# Patient Record
Sex: FEMALE | Race: WHITE | Employment: FULL TIME | URBAN - METROPOLITAN AREA
[De-identification: names, ages, dates, MRNs, and addresses within clinical notes are randomized per-mention and may not be internally consistent; named-entity substitution may affect disease eponyms.]

---

## 2017-07-24 ENCOUNTER — TELEPHONE (OUTPATIENT)
Dept: FAMILY MEDICINE | Facility: CLINIC | Age: 52
End: 2017-07-24

## 2017-07-24 DIAGNOSIS — Z12.31 ENCOUNTER FOR SCREENING MAMMOGRAM FOR BREAST CANCER: Primary | ICD-10-CM

## 2017-07-24 NOTE — TELEPHONE ENCOUNTER
Reason for Call: Request for an order or referral:    Order or referral being requested: mammogram     Date needed: as soon as possible    Has the patient been seen by the PCP for this problem? Not Applicable    Additional comments: Patient needs an order to schedule her routine mammogram.   She would like someone to call her so she can schedule once the order is in.     Phone number Patient can be reached at:  Home number on file [phone] (home)    Best Time:  anytime    Can we leave a detailed message on this number?  YES    Call taken on 7/24/2017 at 1:47 PM by Mya Dougherty

## 2017-07-24 NOTE — TELEPHONE ENCOUNTER
Last mammo was 9/2/16, no new orders in.    Routed to PCP.    Spring Blanc RN  [facility]

## 2017-08-10 ENCOUNTER — TELEPHONE (OUTPATIENT)
Dept: FAMILY MEDICINE | Facility: CLINIC | Age: 52
End: 2017-08-10
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
## 2017-08-10 RX ORDER — ACETAZOLAMIDE 250 MG/1
250 TABLET ORAL 2 TIMES DAILY
Qty: 12 TABLET | Refills: 0 | Status: SHIPPED | OUTPATIENT
Start: 2017-08-10 | End: 2017-09-07

## 2017-08-10 NOTE — TELEPHONE ENCOUNTER
Reason for Call:  Other     Detailed comments: Would like to discuss a medication for high altitude for [place]      Phone Number Patient can be reached at: Cell number on file:    Telephone Information:   Mobile [phone]       Best Time:     Can we leave a detailed message on this number? Not Applicable    Call taken on 8/10/2017 at 11:38 AM by Isamar Dumont

## 2017-08-10 NOTE — TELEPHONE ENCOUNTER
Patient last had routine visit with PCP 9/2/16, was advised annual visit.    I see acetazolamide often used for travel to altitude:  Acetazolamide is used to prevent and reduce the symptoms of altitude sickness. This medication can decrease headache, tiredness, nausea, dizziness, and shortness of breath that can occur when you climb quickly to high altitudes (generally above 10,000 feet/3,048 meters).  Pharmacy selected.    Routed to [facility] to advise on request for travel Rx.    Becky Trejo RN  [facility]

## 2017-08-20 DIAGNOSIS — E03.4 HYPOTHYROIDISM DUE TO ACQUIRED ATROPHY OF THYROID: ICD-10-CM

## 2017-08-21 RX ORDER — LEVOTHYROXINE SODIUM 75 UG/1
TABLET ORAL
Qty: 30 TABLET | Refills: 0 | Status: SHIPPED | OUTPATIENT
Start: 2017-08-21 | End: 2017-10-08

## 2017-08-21 NOTE — TELEPHONE ENCOUNTER
Levothyroxine 75 mcg     Last Written Prescription Date: 5/3/16  Last Quantity: 90, # refills: 3  Last Office Visit with G, P or [facility] prescribing provider: 12/16/16   Next 5 appointments (look out 90 days)     Sep 07, 2017  3:20 PM CDT   PHYSICAL with Tayla Espinal MD   [facility]    [address] [place] [id number]   [phone]                   TSH   Date Value Ref Range Status   09/02/2016 3.08 0.40 - 4.00 mU/L Final     Medication is being filled for 1 time refill only due to:  Patient needs labs TSH.   Is scheduled for physical  Becky Trejo RN  [facility]

## 2017-09-07 ENCOUNTER — OFFICE VISIT (OUTPATIENT)
Dept: FAMILY MEDICINE | Facility: CLINIC | Age: 52
End: 2017-09-07
Payer: COMMERCIAL

## 2017-09-07 VITALS
TEMPERATURE: 98.3 F | SYSTOLIC BLOOD PRESSURE: 128 MMHG | WEIGHT: 198 LBS | HEART RATE: 63 BPM | HEIGHT: 63 IN | OXYGEN SATURATION: 100 % | DIASTOLIC BLOOD PRESSURE: 75 MMHG | BODY MASS INDEX: 35.08 KG/M2

## 2017-09-07 DIAGNOSIS — Z79.1 NSAID LONG-TERM USE: ICD-10-CM

## 2017-09-07 DIAGNOSIS — Z00.00 ROUTINE GENERAL MEDICAL EXAMINATION AT A HEALTH CARE FACILITY: Primary | ICD-10-CM

## 2017-09-07 DIAGNOSIS — Z86.2 HISTORY OF ANEMIA: ICD-10-CM

## 2017-09-07 DIAGNOSIS — E03.4 HYPOTHYROIDISM DUE TO ACQUIRED ATROPHY OF THYROID: ICD-10-CM

## 2017-09-07 DIAGNOSIS — Z12.31 ENCOUNTER FOR SCREENING MAMMOGRAM FOR BREAST CANCER: ICD-10-CM

## 2017-09-07 LAB
ANION GAP SERPL CALCULATED.3IONS-SCNC: 11 MMOL/L (ref 3–14)
BUN SERPL-MCNC: 10 MG/DL (ref 7–30)
CALCIUM SERPL-MCNC: 9.2 MG/DL (ref 8.5–10.1)
CHLORIDE SERPL-SCNC: 105 MMOL/L (ref 94–109)
CO2 SERPL-SCNC: 23 MMOL/L (ref 20–32)
CREAT SERPL-MCNC: 0.85 MG/DL (ref 0.52–1.04)
ERYTHROCYTE [DISTWIDTH] IN BLOOD BY AUTOMATED COUNT: 16 % (ref 10–15)
GFR SERPL CREATININE-BSD FRML MDRD: 70 ML/MIN/1.7M2
GLUCOSE SERPL-MCNC: 93 MG/DL (ref 70–99)
HCT VFR BLD AUTO: 37.7 % (ref 35–47)
HGB BLD-MCNC: 12 G/DL (ref 11.7–15.7)
MCH RBC QN AUTO: 24.9 PG (ref 26.5–33)
MCHC RBC AUTO-ENTMCNC: 31.8 G/DL (ref 31.5–36.5)
MCV RBC AUTO: 78 FL (ref 78–100)
PLATELET # BLD AUTO: 423 10E9/L (ref 150–450)
POTASSIUM SERPL-SCNC: 4.3 MMOL/L (ref 3.4–5.3)
RBC # BLD AUTO: 4.81 10E12/L (ref 3.8–5.2)
SODIUM SERPL-SCNC: 139 MMOL/L (ref 133–144)
TSH SERPL DL<=0.005 MIU/L-ACNC: 3.42 MU/L (ref 0.4–4)
WBC # BLD AUTO: 7.2 10E9/L (ref 4–11)

## 2017-09-07 PROCEDURE — 99396 PREV VISIT EST AGE 40-64: CPT | Performed by: INTERNAL MEDICINE

## 2017-09-07 PROCEDURE — 80048 BASIC METABOLIC PNL TOTAL CA: CPT | Performed by: INTERNAL MEDICINE

## 2017-09-07 PROCEDURE — 85027 COMPLETE CBC AUTOMATED: CPT | Performed by: INTERNAL MEDICINE

## 2017-09-07 PROCEDURE — 84443 ASSAY THYROID STIM HORMONE: CPT | Performed by: INTERNAL MEDICINE

## 2017-09-07 PROCEDURE — 36415 COLL VENOUS BLD VENIPUNCTURE: CPT | Performed by: INTERNAL MEDICINE

## 2017-09-07 NOTE — NURSING NOTE
"Chief Complaint   Patient presents with     Physical     Health Maintenance     tsh       Initial /75 (BP Location: Right arm, Patient Position: Chair, Cuff Size: Adult Regular)  Pulse 63  Temp 98.3  F (36.8  C) (Oral)  Ht 5' 2.5\" (1.588 m)  Wt 198 lb (89.8 kg)  SpO2 100%  BMI 35.64 kg/m2 Estimated body mass index is 35.64 kg/(m^2) as calculated from the following:    Height as of this encounter: 5' 2.5\" (1.588 m).    Weight as of this encounter: 198 lb (89.8 kg).  Medication Reconciliation: complete   Geena Lyons ma      "

## 2017-09-07 NOTE — PROGRESS NOTES
SUBJECTIVE:   CC: Ave Willoughby is an 52 year old woman who presents for preventive health visit.     Physical   Annual:     Getting at least 3 servings of Calcium per day::  Yes    Bi-annual eye exam::  Yes    Dental care twice a year::  Yes    Sleep apnea or symptoms of sleep apnea::  None    Diet::  Regular (no restrictions)    Frequency of exercise::  4-5 days/week    Duration of exercise::  30-45 minutes    Taking medications regularly::  Yes    Medication side effects::  None    Additional concerns today::  No      53 y/o F with hypothyroid, lumbar spinal stenosis, family hx colon cancer and family history for breast cancer here for AFE...     Via WW, she has lost 20# and maintained it so far.     No concerns    Today's PHQ-2 Score: PHQ-2 ( 1999 [employer]) 9/7/2017   Q1: Little interest or pleasure in doing things 0   Q2: Feeling down, depressed or hopeless 0   PHQ-2 Score 0   Q1: Little interest or pleasure in doing things Not at all   Q2: Feeling down, depressed or hopeless Not at all   PHQ-2 Score 0       Abuse: Current or Past(Physical, Sexual or Emotional)- No  Do you feel safe in your environment - Yes    Social History   Substance Use Topics     Smoking status: Never Smoker     Smokeless tobacco: Never Used     Alcohol use Yes      Comment: occasional     The patient does not drink >3 drinks per day nor >7 drinks per week.    Reviewed orders with patient.  Reviewed health maintenance and updated orders accordingly - Yes  Labs reviewed in EPIC  BP Readings from Last 3 Encounters:   09/07/17 128/75   12/16/16 128/81   11/12/16 148/80    Wt Readings from Last 3 Encounters:   09/07/17 198 lb (89.8 kg)   12/16/16 194 lb (88 kg)   11/12/16 194 lb (88 kg)                  Patient Active Problem List   Diagnosis     CARDIOVASCULAR SCREENING; LDL GOAL LESS THAN 130     Lumbar pain     Spondylolisthesis of lumbar region     Lumbar foraminal stenosis     Sacroiliac pain     Family history of colon cancer      Iron deficiency anemia due to chronic blood loss     Hypothyroidism due to acquired atrophy of thyroid     Past Surgical History:   Procedure Laterality Date     APPENDECTOMY  4/11/2011    Dr Lona Duncan     COLONOSCOPY  9-29-08     ENDOSCOPY UPPER, COLONOSCOPY, COMBINED  7/1/2013    Procedure: COMBINED ENDOSCOPY UPPER, COLONOSCOPY;  COLONOSCOPY SCREEN-FAMILY HX OF COLON CANCER/ EGD-UPPER ENDOSCOPY-UPPER GI SYMPTOMS/ PATTIE;  Surgeon: Lc Cervantes MD;  Location: [facility]     ESOPHAGOSCOPY, GASTROSCOPY, DUODENOSCOPY (EGD), COMBINED  7/1/2013    Procedure: COMBINED ESOPHAGOSCOPY, GASTROSCOPY, DUODENOSCOPY (EGD), BIOPSY SINGLE OR MULTIPLE;;  Surgeon: Lc Cervantes MD;  Location: [facility] OR       Social History   Substance Use Topics     Smoking status: Never Smoker     Smokeless tobacco: Never Used     Alcohol use Yes      Comment: occasional     Family History   Problem Relation Age of Onset     Breast Cancer Mother      Thyroid Disease Mother      Cancer - colorectal Father      CANCER Father      brain     DIABETES Maternal Grandmother      Cardiovascular Paternal Grandmother      Thyroid Disease Sister          Current Outpatient Prescriptions   Medication Sig Dispense Refill     levothyroxine (SYNTHROID/LEVOTHROID) 75 MCG tablet TAKE 1 TABLET (75 MCG) BY MOUTH DAILY 30 tablet 0     ibuprofen (ADVIL) 200 MG capsule Take 200 mg by mouth every 4 hours as needed. For menstral cramps       Allergies   Allergen Reactions     Penicillins      Recent Labs   Lab Test  09/02/16   1006  10/15/15   0808   10/09/13   0712  06/06/13   1821   12/01/11   1846 04/10/11   LDL   --   73   --   83   --    --    --    --    HDL   --   46*   --   41*   --    --    --    --    TRIG   --   79   --   92   --    --    --    --    ALT   --    --    --    --   24   --    --   11   CR   --   0.83   --    --    --    --   0.90  0.80   GFRESTIMATED   --   72   --    --    --    --   67   --    GFRESTBLACK   --   88   --    --     --    --   81   --    POTASSIUM   --   4.1   --    --    --    --   4.0  3.5   TSH  3.08  2.69   < >  3.63   --    < >  3.10   --     < > = values in this interval not displayed.              Patient over age 50, mutual decision to screen reflected in health maintenance.      Pertinent mammograms are reviewed under the imaging tab.  History of abnormal Pap smear: NO - age 30-65 PAP every 5 years with negative HPV co-testing recommended    Reviewed and updated as needed this visit by clinical staffTobacco  Allergies  Med Hx  Surg Hx  Fam Hx  Soc Hx        Reviewed and updated as needed this visit by Provider        Past Medical History:   Diagnosis Date     Family history of colon cancer     due 2013 for      Hypothyroid      Sciatic nerve disease       Past Surgical History:   Procedure Laterality Date     APPENDECTOMY  4/11/2011    Dr Lona Duncan     COLONOSCOPY  9-29-08     ENDOSCOPY UPPER, COLONOSCOPY, COMBINED  7/1/2013    Procedure: COMBINED ENDOSCOPY UPPER, COLONOSCOPY;  COLONOSCOPY SCREEN-FAMILY HX OF COLON CANCER/ EGD-UPPER ENDOSCOPY-UPPER GI SYMPTOMS/ PATTIE;  Surgeon: Lc Cervantes MD;  Location: [facility]     ESOPHAGOSCOPY, GASTROSCOPY, DUODENOSCOPY (EGD), COMBINED  7/1/2013    Procedure: COMBINED ESOPHAGOSCOPY, GASTROSCOPY, DUODENOSCOPY (EGD), BIOPSY SINGLE OR MULTIPLE;;  Surgeon: Lc Cervantes MD;  Location: [facility]       ROS:  C: NEGATIVE for fever, chills, change in weight  I: NEGATIVE for worrisome rashes, moles or lesions  E: NEGATIVE for vision changes or irritation  ENT: NEGATIVE for ear, mouth and throat problems  R: NEGATIVE for significant cough or SOB  B: NEGATIVE for masses, tenderness or discharge  CV: NEGATIVE for chest pain, palpitations or peripheral edema  GI: NEGATIVE for nausea, abdominal pain, heartburn, or change in bowel habits  : NEGATIVE for unusual urinary or vaginal symptoms. No vaginal bleeding.    Still has regular periods. Having some dryness.  "  M: NEGATIVE for significant arthralgias or myalgia  N: NEGATIVE for weakness, dizziness or paresthesias  E: NEGATIVE for temperature intolerance, skin/hair changes  P: NEGATIVE for changes in mood or affect      OBJECTIVE:   /75 (BP Location: Right arm, Patient Position: Chair, Cuff Size: Adult Regular)  Pulse 63  Temp 98.3  F (36.8  C) (Oral)  Ht 5' 2.5\" (1.588 m)  Wt 198 lb (89.8 kg)  SpO2 100%  BMI 35.64 kg/m2  EXAM:  GENERAL APPEARANCE: healthy, alert and no distress  EYES: Eyes grossly normal to inspection, PERRL and conjunctivae and sclerae normal  HENT: ear canals and TM's normal, nose and mouth without ulcers or lesions, oropharynx clear and oral mucous membranes moist  NECK: no adenopathy, no asymmetry, masses, or scars and thyroid normal to palpation  RESP: lungs clear to auscultation - no rales, rhonchi or wheezes  BREAST: normal without masses, tenderness or nipple discharge and no palpable axillary masses or adenopathy  CV: regular rate and rhythm, normal S1 S2, no S3 or S4, no murmur, click or rub, no peripheral edema and peripheral pulses strong  ABDOMEN: soft, nontender, no hepatosplenomegaly, no masses and bowel sounds normal  ABDOMEN:  appy scar.   MS: no musculoskeletal defects are noted and gait is age appropriate without ataxia  SKIN: no suspicious lesions or rashes  NEURO: Normal strength and tone, sensory exam grossly normal, mentation intact and speech normal  PSYCH: mentation appears normal and affect normal/bright    ASSESSMENT/PLAN:       ICD-10-CM    1. Routine general medical examination at a health care facility Z00.00    2. Hypothyroidism due to acquired atrophy of thyroid E03.4 TSH WITH FREE T4 REFLEX   3. Encounter for screening mammogram for breast cancer Z12.31 *MA Screening Digital Bilateral   4. NSAID long-term use Z79.1 Basic metabolic panel   5. History of anemia Z86.2 CBC with platelets       Late menopause. Still having regular periods: No spotting no " "menorrhagia.  Very cyclical.  Watchful waiting for now.   Patient will get her routine mammogram soon.   COUNSELING:  Reviewed preventive health counseling, as reflected in patient instructions       Healthy diet/nutrition         reports that she has never smoked. She has never used smokeless tobacco.    Estimated body mass index is 35.64 kg/(m^2) as calculated from the following:    Height as of this encounter: 5' 2.5\" (1.588 m).    Weight as of this encounter: 198 lb (89.8 kg).   Weight management plan: has lost weight via WW. exercising    Counseling Resources:  ATP IV Guidelines  Pooled Cohorts Equation Calculator  Breast Cancer Risk Calculator  FRAX Risk Assessment  ICSI Preventive Guidelines  Dietary Guidelines for Americans, 2010  USDA's MyPlate  ASA Prophylaxis  Lung CA Screening    Tayla Espinal MD  [facility]  Answers for HPI/ROS submitted by the patient on 9/7/2017   PHQ-2 Score: 0    "

## 2017-09-07 NOTE — MR AVS SNAPSHOT
After Visit Summary   9/7/2017    Ave Willoughby    MRN: [id number]           Patient Information     Date Of Birth          1965        Visit Information        Provider Department      9/7/2017 3:20 PM Tayla Espinal MD [facility]        Today's Diagnoses     Routine general medical examination at a health care facility    -  1    Hypothyroidism due to acquired atrophy of thyroid        Encounter for screening mammogram for breast cancer        NSAID long-term use        History of anemia           Follow-ups after your visit        Follow-up notes from your care team     Return in about 1 year (around 9/7/2018), or if symptoms worsen or fail to improve, for Physical Exam.      Future tests that were ordered for you today     Open Future Orders        Priority Expected Expires Ordered    *MA Screening Digital Bilateral Routine  9/7/2018 9/7/2017            Who to contact     If you have questions or need follow up information about today's clinic visit or your schedule please contact [facility] directly at [phone].  Normal or non-critical lab and imaging results will be communicated to you by Zeviahart, letter or phone within 4 business days after the clinic has received the results. If you do not hear from us within 7 days, please contact the clinic through Zeviahart or phone. If you have a critical or abnormal lab result, we will notify you by phone as soon as possible.  Submit refill requests through Behalf or call your pharmacy and they will forward the refill request to us. Please allow 3 business days for your refill to be completed.          Additional Information About Your Visit        MyChart Information     Behalf gives you secure access to your electronic health record. If you see a primary care provider, you can also send messages to your care team and make appointments. If you have questions, please call your primary care  "clinic.  If you do not have a primary care provider, please call [phone] and they will assist you.        Care EveryWhere ID     This is your Care EveryWhere ID. This could be used by other organizations to access your [place] medical records  [id number]        Your Vitals Were     Pulse Temperature Height Pulse Oximetry BMI (Body Mass Index)       63 98.3  F (36.8  C) (Oral) 5' 2.5\" (1.588 m) 100% 35.64 kg/m2        Blood Pressure from Last 3 Encounters:   09/07/17 128/75   12/16/16 128/81   11/12/16 148/80    Weight from Last 3 Encounters:   09/07/17 198 lb (89.8 kg)   12/16/16 194 lb (88 kg)   11/12/16 194 lb (88 kg)              We Performed the Following     Basic metabolic panel     CBC with platelets     TSH WITH FREE T4 REFLEX        Primary Care Provider Office Phone # Fax #    Tayla Espinal [phone]       [address] [facility] [address]        Equal Access to Services     [facility]: Hadii aad ku hadasho Soomaali, waaxda luqadaha, qaybta kaalmada adeegyayessi, anai cutler . So [facility] [phone].    ATENCIÓN: Si habla español, tiene a carrillo disposición servicios gratuitos de asistencia lingüística. Llame al [phone].    We comply with applicable federal civil rights laws and [place] laws. We do not discriminate on the basis of race, color, national origin, age, disability sex, sexual orientation or gender identity.            Thank you!     Thank you for choosing [facility]  for your care. Our goal is always to provide you with excellent care. Hearing back from our patients is one way we can continue to improve our services. Please take a few minutes to complete the written survey that you may receive in the mail after your visit with us. Thank you!             Your Updated Medication List - Protect others around you: Learn how to safely use, store and throw away your medicines at www.disposemymeds.org. "          This list is accurate as of: 9/7/17  4:10 PM.  Always use your most recent med list.                   Brand Name Dispense Instructions for use Diagnosis    ADVIL 200 MG capsule   Generic drug:  ibuprofen      Take 200 mg by mouth every 4 hours as needed. For menstral cramps        levothyroxine 75 MCG tablet    SYNTHROID/LEVOTHROID    30 tablet    TAKE 1 TABLET (75 MCG) BY MOUTH DAILY    Hypothyroidism due to acquired atrophy of thyroid

## 2017-10-08 DIAGNOSIS — E03.4 HYPOTHYROIDISM DUE TO ACQUIRED ATROPHY OF THYROID: ICD-10-CM

## 2017-10-09 RX ORDER — LEVOTHYROXINE SODIUM 75 UG/1
TABLET ORAL
Qty: 90 TABLET | Refills: 3 | Status: SHIPPED | OUTPATIENT
Start: 2017-10-09 | End: 2018-11-08

## 2017-10-09 NOTE — TELEPHONE ENCOUNTER
levothyroxine (SYNTHROID/LEVOTHROID) 75 MCG tablet     Last Written Prescription Date: 8/21/17  Last Quantity: 30, # refills: 0  Last Office Visit with [facility], [facility] or [facility] prescribing provider: 9/7/17        TSH   Date Value Ref Range Status   09/07/2017 3.42 0.40 - 4.00 mU/L Final

## 2017-10-09 NOTE — TELEPHONE ENCOUNTER
Prescription approved per [facility] Refill Protocol.    Spring Blanc RN  [facility]

## 2017-10-20 ENCOUNTER — RADIANT APPOINTMENT (OUTPATIENT)
Dept: MAMMOGRAPHY | Facility: CLINIC | Age: 52
End: 2017-10-20
Payer: COMMERCIAL

## 2017-10-20 DIAGNOSIS — Z12.31 VISIT FOR SCREENING MAMMOGRAM: ICD-10-CM

## 2017-10-20 PROCEDURE — G0202 SCR MAMMO BI INCL CAD: HCPCS | Mod: TC

## 2018-04-10 ENCOUNTER — TRANSFERRED RECORDS (OUTPATIENT)
Dept: HEALTH INFORMATION MANAGEMENT | Facility: CLINIC | Age: 53
End: 2018-04-10

## 2018-06-12 ENCOUNTER — OFFICE VISIT (OUTPATIENT)
Dept: FAMILY MEDICINE | Facility: CLINIC | Age: 53
End: 2018-06-12
Payer: COMMERCIAL

## 2018-06-12 VITALS
WEIGHT: 199 LBS | DIASTOLIC BLOOD PRESSURE: 74 MMHG | BODY MASS INDEX: 35.82 KG/M2 | OXYGEN SATURATION: 100 % | HEART RATE: 65 BPM | TEMPERATURE: 98.5 F | SYSTOLIC BLOOD PRESSURE: 132 MMHG

## 2018-06-12 DIAGNOSIS — Z12.11 SPECIAL SCREENING FOR MALIGNANT NEOPLASMS, COLON: ICD-10-CM

## 2018-06-12 DIAGNOSIS — Z11.4 SCREENING FOR HIV (HUMAN IMMUNODEFICIENCY VIRUS): ICD-10-CM

## 2018-06-12 DIAGNOSIS — E03.4 HYPOTHYROIDISM DUE TO ACQUIRED ATROPHY OF THYROID: Primary | ICD-10-CM

## 2018-06-12 DIAGNOSIS — R52 BODY ACHES: ICD-10-CM

## 2018-06-12 DIAGNOSIS — Z86.39 HISTORY OF IRON DEFICIENCY: ICD-10-CM

## 2018-06-12 LAB
BASOPHILS # BLD AUTO: 0.1 10E9/L (ref 0–0.2)
BASOPHILS NFR BLD AUTO: 1.2 %
CRP SERPL-MCNC: <2.9 MG/L (ref 0–8)
DIFFERENTIAL METHOD BLD: ABNORMAL
EOSINOPHIL # BLD AUTO: 0.3 10E9/L (ref 0–0.7)
EOSINOPHIL NFR BLD AUTO: 5.4 %
ERYTHROCYTE [DISTWIDTH] IN BLOOD BY AUTOMATED COUNT: 19.1 % (ref 10–15)
ERYTHROCYTE [SEDIMENTATION RATE] IN BLOOD BY WESTERGREN METHOD: 10 MM/H (ref 0–30)
FERRITIN SERPL-MCNC: 8 NG/ML (ref 8–252)
HCT VFR BLD AUTO: 42.7 % (ref 35–47)
HGB BLD-MCNC: 14 G/DL (ref 11.7–15.7)
LYMPHOCYTES # BLD AUTO: 1.9 10E9/L (ref 0.8–5.3)
LYMPHOCYTES NFR BLD AUTO: 32.4 %
MCH RBC QN AUTO: 26.5 PG (ref 26.5–33)
MCHC RBC AUTO-ENTMCNC: 32.8 G/DL (ref 31.5–36.5)
MCV RBC AUTO: 81 FL (ref 78–100)
MONOCYTES # BLD AUTO: 0.5 10E9/L (ref 0–1.3)
MONOCYTES NFR BLD AUTO: 7.9 %
NEUTROPHILS # BLD AUTO: 3.2 10E9/L (ref 1.6–8.3)
NEUTROPHILS NFR BLD AUTO: 53.1 %
PLATELET # BLD AUTO: 348 10E9/L (ref 150–450)
RBC # BLD AUTO: 5.29 10E12/L (ref 3.8–5.2)
TSH SERPL DL<=0.005 MIU/L-ACNC: 2.88 MU/L (ref 0.4–4)
WBC # BLD AUTO: 5.9 10E9/L (ref 4–11)

## 2018-06-12 PROCEDURE — 87389 HIV-1 AG W/HIV-1&-2 AB AG IA: CPT | Performed by: INTERNAL MEDICINE

## 2018-06-12 PROCEDURE — 86618 LYME DISEASE ANTIBODY: CPT | Performed by: INTERNAL MEDICINE

## 2018-06-12 PROCEDURE — 86140 C-REACTIVE PROTEIN: CPT | Performed by: INTERNAL MEDICINE

## 2018-06-12 PROCEDURE — 99214 OFFICE O/P EST MOD 30 MIN: CPT | Performed by: INTERNAL MEDICINE

## 2018-06-12 PROCEDURE — 85025 COMPLETE CBC W/AUTO DIFF WBC: CPT | Performed by: INTERNAL MEDICINE

## 2018-06-12 PROCEDURE — 84443 ASSAY THYROID STIM HORMONE: CPT | Performed by: INTERNAL MEDICINE

## 2018-06-12 PROCEDURE — 36415 COLL VENOUS BLD VENIPUNCTURE: CPT | Performed by: INTERNAL MEDICINE

## 2018-06-12 PROCEDURE — 82728 ASSAY OF FERRITIN: CPT | Performed by: INTERNAL MEDICINE

## 2018-06-12 PROCEDURE — 85652 RBC SED RATE AUTOMATED: CPT | Performed by: INTERNAL MEDICINE

## 2018-06-12 ASSESSMENT — PAIN SCALES - GENERAL: PAINLEVEL: SEVERE PAIN (6)

## 2018-06-12 NOTE — PROGRESS NOTES
"  SUBJECTIVE:   Ave Willoughby is a 52 year old female who presents to clinic today for the following health issues:    51 y/o F with h/o Lumbar SS, hypothyroid and h/o TAMIKA here due to \"not feeling well\"     .   Fatigue      Duration: a week and a half    Description (location/character/radiation): Feeling little to no energy, headache, stiff neck    Intensity:  severe    Accompanying signs and symptoms: None    History (similar episodes/previous evaluation): None    Precipitating or alleviating factors: Trying to rest and drink more water    Therapies tried and outcome: Advil helps for a few hours but symptoms do come back     Stated that a friend had mono and her symptoms were similar.    Michelle Bee MA    Still getting monthly periods (skipped a month or two this year.)  No fever no cough.  Claims compliance with meds.    Wants to sleep a lot more, achey a lot of the time (elbows and knees are achey).  Might be improved:  Did mow lawn.      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 Patient lives in [place] and has not recently travelled.   No camping.          Problem list and histories reviewed & adjusted, as indicated.  Additional history: as documented    Patient Active Problem List   Diagnosis     CARDIOVASCULAR SCREENING; LDL GOAL LESS THAN 130     Lumbar pain     Spondylolisthesis of lumbar region     Lumbar foraminal stenosis     Sacroiliac pain     Family history of colon cancer     Iron deficiency anemia due to chronic blood loss     Hypothyroidism due to acquired atrophy of thyroid     Past Surgical History:   Procedure Laterality Date     APPENDECTOMY  4/11/2011    Dr Lona Duncan     COLONOSCOPY  9-29-08     ENDOSCOPY UPPER, COLONOSCOPY, COMBINED  7/1/2013    Procedure: COMBINED ENDOSCOPY UPPER, COLONOSCOPY;  COLONOSCOPY SCREEN-FAMILY HX OF COLON CANCER/ EGD-UPPER " ENDOSCOPY-UPPER GI SYMPTOMS/ PATTIE;  Surgeon: Lc Cervantes MD;  Location: [facility]     ESOPHAGOSCOPY, GASTROSCOPY, DUODENOSCOPY (EGD), COMBINED  7/1/2013    Procedure: COMBINED ESOPHAGOSCOPY, GASTROSCOPY, DUODENOSCOPY (EGD), BIOPSY SINGLE OR MULTIPLE;;  Surgeon: Lc Cervantes MD;  Location: [facility] OR       Social History   Substance Use Topics     Smoking status: Never Smoker     Smokeless tobacco: Never Used     Alcohol use Yes      Comment: occasional     Family History   Problem Relation Age of Onset     Breast Cancer Mother      Thyroid Disease Mother      Cancer - colorectal Father      CANCER Father      brain     DIABETES Maternal Grandmother      Cardiovascular Paternal Grandmother      Thyroid Disease Sister          Current Outpatient Prescriptions   Medication Sig Dispense Refill     ibuprofen (ADVIL) 200 MG capsule Take 200 mg by mouth every 4 hours as needed. For menstral cramps       levothyroxine (SYNTHROID/LEVOTHROID) 75 MCG tablet TAKE 1 TABLET BY MOUTH DAILY 90 tablet 3     Allergies   Allergen Reactions     Penicillins      BP Readings from Last 3 Encounters:   06/12/18 132/74   09/07/17 128/75   12/16/16 128/81    Wt Readings from Last 3 Encounters:   06/12/18 199 lb (90.3 kg)   09/07/17 198 lb (89.8 kg)   12/16/16 194 lb (88 kg)                    Reviewed and updated as needed this visit by clinical staff       Reviewed and updated as needed this visit by Provider         ROS:  Constitutional, HEENT, cardiovascular, pulmonary, gi and gu systems are negative, except as otherwise noted.    OBJECTIVE:     /74 (BP Location: Right arm, Patient Position: Chair, Cuff Size: Adult Large)  Pulse 65  Temp 98.5  F (36.9  C) (Oral)  Wt 199 lb (90.3 kg)  LMP 06/24/2012  SpO2 100%  BMI 35.82 kg/m2  Body mass index is 35.82 kg/(m^2).  GENERAL: healthy, alert and no distress  EYES: Eyes grossly normal to inspection, PERRL and conjunctivae and sclerae normal  HENT: ear canals and  TM's normal, nose and mouth without ulcers or lesions  NECK: no adenopathy, no asymmetry, masses, or scars and thyroid normal to palpation  RESP: lungs clear to auscultation - no rales, rhonchi or wheezes  CV: regular rate and rhythm, normal S1 S2, no S3 or S4, no murmur, click or rub, no peripheral edema and peripheral pulses strong  ABDOMEN: soft, nontender, no hepatosplenomegaly, no masses and bowel sounds normal  MS: no gross musculoskeletal defects noted, no edema  SKIN: no suspicious lesions or rashes  PSYCH: mentation appears normal, affect normal/bright  LYMPH: no cervical, supraclavicular, axillary, or inguinal adenopathy    Diagnostic Test Results:  See labs orders.     ASSESSMENT/PLAN:          ICD-10-CM    1. Hypothyroidism due to acquired atrophy of thyroid E03.4 TSH with free T4 reflex   2. History of iron deficiency Z86.39 Ferritin   3. Body aches R52 Lyme Disease Anaya with reflex to WB Serum     ESR: Erythrocyte sedimentation rate     CRP, inflammation     CBC with platelets differential   4. Special screening for malignant neoplasms, colon Z12.11 GASTROENTEROLOGY ADULT REF PROCEDURE ONLY   5. Screening for HIV (human immunodeficiency virus) Z11.4 HIV Antigen Antibody Combo        likely viral syndrome, expect improvement, call if no improvement.     Colonoscopy due, patient would like order entered.     Tayla Espinal MD  [facility]

## 2018-06-12 NOTE — MR AVS SNAPSHOT
After Visit Summary   6/12/2018    Ave Willoughby    MRN: [id number]           Patient Information     Date Of Birth          1965        Visit Information        Provider Department      6/12/2018 2:00 PM Tayla Espinal MD [facility]        Today's Diagnoses     Hypothyroidism due to acquired atrophy of thyroid    -  1    History of iron deficiency        Body aches        Special screening for malignant neoplasms, colon        Screening for HIV (human immunodeficiency virus)           Follow-ups after your visit        Additional Services     GASTROENTEROLOGY ADULT REF PROCEDURE ONLY       Last Lab Result: Creatinine (mg/dL)       Date                     Value                 09/07/2017               0.85             ----------  Body mass index is 35.82 kg/(m^2).     Needed:  No  Language:  English    Patient will be contacted to schedule procedure.     Please be aware that coverage of these services is subject to the terms and limitations of your health insurance plan.  Call member services at your health plan with any benefit or coverage questions.  Any procedures must be performed at a [facility] facility OR coordinated by your clinic's referral office.    Please bring the following with you to your appointment:    (1) Any X-Rays, CTs or MRIs which have been performed.  Contact the facility where they were done to arrange for  prior to your scheduled appointment.    (2) List of current medications   (3) This referral request   (4) Any documents/labs given to you for this referral                  Who to contact     If you have questions or need follow up information about today's clinic visit or your schedule please contact [facility] directly at [phone].  Normal or non-critical lab and imaging results will be communicated to you by MyChart, letter or phone within 4 business days after the clinic has received the  results. If you do not hear from us within 7 days, please contact the clinic through [employer] or phone. If you have a critical or abnormal lab result, we will notify you by phone as soon as possible.  Submit refill requests through [employer] or call your pharmacy and they will forward the refill request to us. Please allow 3 business days for your refill to be completed.          Additional Information About Your Visit        [employer] Information     [employer] gives you secure access to your electronic health record. If you see a primary care provider, you can also send messages to your care team and make appointments. If you have questions, please call your primary care clinic.  If you do not have a primary care provider, please call [phone] and they will assist you.        Care EveryWhere ID     This is your Care EveryWhere ID. This could be used by other organizations to access your [facility] medical records  [id number]        Your Vitals Were     Pulse Temperature Last Period Pulse Oximetry BMI (Body Mass Index)       65 98.5  F (36.9  C) (Oral) 06/24/2012 100% 35.82 kg/m2        Blood Pressure from Last 3 Encounters:   06/12/18 132/74   09/07/17 128/75   12/16/16 128/81    Weight from Last 3 Encounters:   06/12/18 199 lb (90.3 kg)   09/07/17 198 lb (89.8 kg)   12/16/16 194 lb (88 kg)              We Performed the Following     CBC with platelets differential     CRP, inflammation     ESR: Erythrocyte sedimentation rate     Ferritin     GASTROENTEROLOGY ADULT REF PROCEDURE ONLY     HIV Antigen Antibody Combo     Lyme Disease Anaya with reflex to WB Serum     TSH with free T4 reflex        Primary Care Provider Office Phone # Fax #    Tayla Espinal [phone]       [address] [facility] [address]        Equal Access to Services     [place] : Ellen Gaspar, romy holguin, anai camarena. So [facility]  [phone].    ATENCIÓN: Si sara garcia, tiene a carrillo disposición servicios gratuitos de asistencia lingüística. Edwina little [phone].    We comply with applicable federal civil rights laws and [place] laws. We do not discriminate on the basis of race, color, national origin, age, disability, sex, sexual orientation, or gender identity.            Thank you!     Thank you for choosing [facility]  for your care. Our goal is always to provide you with excellent care. Hearing back from our patients is one way we can continue to improve our services. Please take a few minutes to complete the written survey that you may receive in the mail after your visit with us. Thank you!             Your Updated Medication List - Protect others around you: Learn how to safely use, store and throw away your medicines at www.disposemymeds.org.          This list is accurate as of 6/12/18  2:32 PM.  Always use your most recent med list.                   Brand Name Dispense Instructions for use Diagnosis    ADVIL 200 MG capsule   Generic drug:  ibuprofen      Take 200 mg by mouth every 4 hours as needed. For menstral cramps        levothyroxine 75 MCG tablet    SYNTHROID/LEVOTHROID    90 tablet    TAKE 1 TABLET BY MOUTH DAILY    Hypothyroidism due to acquired atrophy of thyroid

## 2018-06-13 LAB
B BURGDOR IGG+IGM SER QL: 0.14 (ref 0–0.89)
HIV 1+2 AB+HIV1 P24 AG SERPL QL IA: NONREACTIVE

## 2018-06-13 NOTE — PROGRESS NOTES
Yunier White    Inflammatory labs and Lyme titer all normal.   If your symptoms don't improve by next week, let me know    Sincerely,     MALKA BLANKENSHIP M.D.

## 2018-08-07 ENCOUNTER — TRANSFERRED RECORDS (OUTPATIENT)
Dept: HEALTH INFORMATION MANAGEMENT | Facility: CLINIC | Age: 53
End: 2018-08-07

## 2018-08-09 ENCOUNTER — HOSPITAL ENCOUNTER (OUTPATIENT)
Facility: AMBULATORY SURGERY CENTER | Age: 53
Discharge: HOME | End: 2018-08-09
Attending: SURGERY | Admitting: SURGERY
Payer: COMMERCIAL

## 2018-08-09 ENCOUNTER — SURGERY (OUTPATIENT)
Age: 53
End: 2018-08-09

## 2018-08-09 VITALS
BODY MASS INDEX: 31.58 KG/M2 | SYSTOLIC BLOOD PRESSURE: 109 MMHG | HEART RATE: 52 BPM | WEIGHT: 185 LBS | TEMPERATURE: 98.2 F | DIASTOLIC BLOOD PRESSURE: 64 MMHG | RESPIRATION RATE: 16 BRPM | OXYGEN SATURATION: 99 % | HEIGHT: 64 IN

## 2018-08-09 LAB — COLONOSCOPY: NORMAL

## 2018-08-09 PROCEDURE — 45385 COLONOSCOPY W/LESION REMOVAL: CPT | Mod: PT | Performed by: SURGERY

## 2018-08-09 PROCEDURE — 99152 MOD SED SAME PHYS/QHP 5/>YRS: CPT | Mod: 59 | Performed by: SURGERY

## 2018-08-09 PROCEDURE — 45380 COLONOSCOPY AND BIOPSY: CPT | Mod: XS

## 2018-08-09 PROCEDURE — 45380 COLONOSCOPY AND BIOPSY: CPT | Mod: 59 | Performed by: SURGERY

## 2018-08-09 PROCEDURE — G8918 PT W/O PREOP ORDER IV AB PRO: HCPCS

## 2018-08-09 PROCEDURE — G8907 PT DOC NO EVENTS ON DISCHARG: HCPCS

## 2018-08-09 PROCEDURE — 99153 MOD SED SAME PHYS/QHP EA: CPT | Mod: 59 | Performed by: SURGERY

## 2018-08-09 PROCEDURE — 45385 COLONOSCOPY W/LESION REMOVAL: CPT

## 2018-08-09 PROCEDURE — 88305 TISSUE EXAM BY PATHOLOGIST: CPT | Performed by: SURGERY

## 2018-08-09 RX ORDER — FENTANYL CITRATE 50 UG/ML
INJECTION, SOLUTION INTRAMUSCULAR; INTRAVENOUS PRN
Status: DISCONTINUED | OUTPATIENT
Start: 2018-08-09 | End: 2018-08-09 | Stop reason: HOSPADM

## 2018-08-09 RX ADMIN — FENTANYL CITRATE 50 MCG: 50 INJECTION, SOLUTION INTRAMUSCULAR; INTRAVENOUS at 08:59

## 2018-08-09 RX ADMIN — FENTANYL CITRATE 50 MCG: 50 INJECTION, SOLUTION INTRAMUSCULAR; INTRAVENOUS at 08:52

## 2018-08-09 RX ADMIN — FENTANYL CITRATE 50 MCG: 50 INJECTION, SOLUTION INTRAMUSCULAR; INTRAVENOUS at 08:51

## 2018-08-14 LAB — COPATH REPORT: NORMAL

## 2018-10-08 ENCOUNTER — TELEPHONE (OUTPATIENT)
Dept: FAMILY MEDICINE | Facility: CLINIC | Age: 53
End: 2018-10-08

## 2018-10-08 DIAGNOSIS — Z79.1 NSAID LONG-TERM USE: ICD-10-CM

## 2018-10-08 DIAGNOSIS — Z13.6 CARDIOVASCULAR SCREENING; LDL GOAL LESS THAN 130: ICD-10-CM

## 2018-10-08 DIAGNOSIS — E03.4 HYPOTHYROIDISM DUE TO ACQUIRED ATROPHY OF THYROID: Primary | ICD-10-CM

## 2018-10-08 NOTE — TELEPHONE ENCOUNTER
Patient has a physical schedule in November.  Will she need fasting labs? Looks like her last lipid was 2015?  Thank you.  Jami Talley RN

## 2018-10-08 NOTE — TELEPHONE ENCOUNTER
Reason for Call:  Other / Question regarding labs for her physical    Detailed comments: Patient would like to find out if her labs need to be fasting labs for her physical.    Phone Number Patient can be reached at: Cell number on file:    Telephone Information:   Mobile [phone]       Best Time: Anytime    Can we leave a detailed message on this number? YES    Call taken on 10/8/2018 at 5:25 PM by Paula Lorenz

## 2018-10-08 NOTE — TELEPHONE ENCOUNTER
Reason for Call:  Other / Labs order request (Physical Nov 8th)    Detailed comments: Patient called and scheduled appointment for labs and physical, and is requesting an order to have labs done a week before her physical.  Patient scheduled labs appointment on Oct 29th.    Phone Number Patient can be reached at: Cell number on file:    Telephone Information:   Mobile [phone]       Best Time: Anytime    Can we leave a detailed message on this number? YES    Call taken on 10/8/2018 at 5:19 PM by Paula Lorenz

## 2018-10-09 NOTE — TELEPHONE ENCOUNTER
Attempt # 1  Called patient at home [phone]  Was call answered?  No answer, left detailed message as below and to call nurse line at [phone] if has questions.      Joya Weiss RN  [facility]

## 2018-11-08 ENCOUNTER — OFFICE VISIT (OUTPATIENT)
Dept: FAMILY MEDICINE | Facility: CLINIC | Age: 53
End: 2018-11-08
Payer: COMMERCIAL

## 2018-11-08 VITALS
TEMPERATURE: 97.1 F | SYSTOLIC BLOOD PRESSURE: 135 MMHG | BODY MASS INDEX: 38.51 KG/M2 | HEART RATE: 67 BPM | DIASTOLIC BLOOD PRESSURE: 82 MMHG | OXYGEN SATURATION: 99 % | WEIGHT: 204 LBS | HEIGHT: 61 IN

## 2018-11-08 DIAGNOSIS — Z13.6 CARDIOVASCULAR SCREENING; LDL GOAL LESS THAN 130: ICD-10-CM

## 2018-11-08 DIAGNOSIS — E03.4 HYPOTHYROIDISM DUE TO ACQUIRED ATROPHY OF THYROID: ICD-10-CM

## 2018-11-08 DIAGNOSIS — Z00.00 ROUTINE GENERAL MEDICAL EXAMINATION AT A HEALTH CARE FACILITY: Primary | ICD-10-CM

## 2018-11-08 LAB
CHOLEST SERPL-MCNC: 161 MG/DL
GLUCOSE SERPL-MCNC: 80 MG/DL (ref 70–99)
HDLC SERPL-MCNC: 52 MG/DL
LDLC SERPL CALC-MCNC: 83 MG/DL
NONHDLC SERPL-MCNC: 109 MG/DL
TRIGL SERPL-MCNC: 130 MG/DL
TSH SERPL DL<=0.005 MIU/L-ACNC: 3.51 MU/L (ref 0.4–4)

## 2018-11-08 PROCEDURE — 84443 ASSAY THYROID STIM HORMONE: CPT | Performed by: INTERNAL MEDICINE

## 2018-11-08 PROCEDURE — 36415 COLL VENOUS BLD VENIPUNCTURE: CPT | Performed by: INTERNAL MEDICINE

## 2018-11-08 PROCEDURE — 82947 ASSAY GLUCOSE BLOOD QUANT: CPT | Performed by: INTERNAL MEDICINE

## 2018-11-08 PROCEDURE — 80061 LIPID PANEL: CPT | Performed by: INTERNAL MEDICINE

## 2018-11-08 PROCEDURE — 99396 PREV VISIT EST AGE 40-64: CPT | Performed by: INTERNAL MEDICINE

## 2018-11-08 RX ORDER — LEVOTHYROXINE SODIUM 75 UG/1
75 TABLET ORAL DAILY
Qty: 90 TABLET | Refills: 3 | Status: SHIPPED | OUTPATIENT
Start: 2018-11-08 | End: 2020-02-17

## 2018-11-08 ASSESSMENT — ENCOUNTER SYMPTOMS
EYE PAIN: 0
DIARRHEA: 0
CONSTIPATION: 0
CHILLS: 0
HEMATURIA: 0
DIZZINESS: 0
ABDOMINAL PAIN: 0
COUGH: 0
HEMATOCHEZIA: 0

## 2018-11-08 NOTE — MR AVS SNAPSHOT
After Visit Summary   11/8/2018    Ave Willoughby    MRN: [id number]           Patient Information     Date Of Birth          1965        Visit Information        Provider Department      11/8/2018 3:00 PM Tayla Espinal MD [facility]        Today's Diagnoses     Routine general medical examination at a health care facility    -  1    Hypothyroidism due to acquired atrophy of thyroid        CARDIOVASCULAR SCREENING; LDL GOAL LESS THAN 130          Care Instructions    Make an ancillary appointment for Shingrix vaccine series.                 Follow-ups after your visit        Follow-up notes from your care team     Return in about 1 year (around 11/8/2019) for Physical Exam.      Your next 10 appointments already scheduled     Nov 21, 2018  9:15 AM CST   MA SCREENING DIGITAL BILATERAL with FKMA1   [facility]    [address] [place] [id number] [phone]           How do I prepare for my exam? (Food and drink instructions) No Food and Drink Restrictions.  How do I prepare for my exam? (Other instructions) Do not use any powder, lotion or deodorant under your arms or on your breast. If you do, we will ask you to remove it before your exam.  What should I wear: Wear comfortable, two-piece clothing.  How long does the exam take: Most scans will take 15 minutes.  What should I bring: Bring any previous mammograms from other facilities or have them mailed to the breast center.  Do I need a :  No  is needed.  What do I need to tell my doctor: If you have any allergies, tell your care team.  What should I do after the exam: No restrictions, You may resume normal activities.  What is this test: This test is an x-ray of the breast to look for breast disease. The breast is pressed between two plates to flatten and spread the tissue. An X-ray is taken of the breast from different angles.  Who should I  "call with questions: If you have any questions, please call the Imaging Department where you will have your exam. Directions, parking instructions, and other information is available on our website, [email or web address].  Other information about my exam Three-dimensional (3D) mammograms are available at [facility] locations in [facility], [place], [facility] [employer] [facility], [facility], [place], [facility] and [place].  Health locations include [place] and the [facility] in [place].  Benefits of 3D mammograms include * Improved rate of cancer detection * Decreases your chance of having to go back for more tests, which means fewer: * \"False-positive\" results (This means that there is an abnormal area but it isn't cancer.) * Invasive testing procedures, such as a biopsy or surgery * Can provide clearer images of the breast if you have dense breast tissue.  *3D mammography is an optional exam that anyone can have with a 2D mammogram. It doesn't replace or take the place of a 2D mammogram. 2D mammograms remain an effective screening test for all women.  Not all insurance companies cover the cost of a 3D mammogram. Check with your insurance.              Who to contact     If you have questions or need follow up information about today's clinic visit or your schedule please contact [facility] directly at [phone].  Normal or non-critical lab and imaging results will be communicated to you by MyChart, letter or phone within 4 business days after the clinic has received the results. If you do not hear from us within 7 days, please contact the clinic through MyChart or phone. If you have a critical or abnormal lab result, we will notify you by phone as soon as possible.  Submit refill requests through [employer] or call your pharmacy and they will forward the refill request to us. Please allow 3 business days for your refill to be completed.          Additional Information About " "Your Visit        MyChart Information     [employer] gives you secure access to your electronic health record. If you see a primary care provider, you can also send messages to your care team and make appointments. If you have questions, please call your primary care clinic.  If you do not have a primary care provider, please call [phone] and they will assist you.        Care EveryWhere ID     This is your Care EveryWhere ID. This could be used by other organizations to access your [place] medical records  [id number]        Your Vitals Were     Pulse Temperature Height Last Period Pulse Oximetry BMI (Body Mass Index)    67 97.1  F (36.2  C) (Oral) 5' 0.5\" (1.537 m) 06/24/2012 99% 39.19 kg/m2       Blood Pressure from Last 3 Encounters:   11/08/18 135/82   08/09/18 109/64   06/12/18 132/74    Weight from Last 3 Encounters:   11/08/18 204 lb (92.5 kg)   08/07/18 185 lb (83.9 kg)   06/12/18 199 lb (90.3 kg)              We Performed the Following     **Glucose FUTURE anytime     Lipid panel reflex to direct LDL Non-fasting     TSH with free T4 reflex          Today's Medication Changes          These changes are accurate as of 11/8/18  3:41 PM.  If you have any questions, ask your nurse or doctor.               These medicines have changed or have updated prescriptions.        Dose/Directions    levothyroxine 75 MCG tablet   Commonly known as:  SYNTHROID/LEVOTHROID   This may have changed:  See the new instructions.   Used for:  Hypothyroidism due to acquired atrophy of thyroid   Changed by:  Tayla Espinal MD        Dose:  75 mcg   Take 1 tablet (75 mcg) by mouth daily   Quantity:  90 tablet   Refills:  3            Where to get your medicines      These medications were sent [place] - [address] [facility]  [address] [facility] [address]     Phone:  [phone]     levothyroxine 75 MCG tablet                Primary Care Provider Office Phone # Fax #    Tayla RICHARDSON " MD Kylie [phone]       [address] [facility] [address]        Equal Access to Services     [place] : Ellen Gaspar, romy holguin, yoavrafi kiranmayessi boydgennyyessi, waxerika violetin hayaakirstin quinonezdylan deleon omayra mccarthy. So [facility] [phone].    ATENCIÓN: Si habla español, tiene a carrillo disposición servicios gratuitos de asistencia lingüística. Llame al [phone].    We comply with applicable federal civil rights laws and [place] laws. We do not discriminate on the basis of race, color, national origin, age, disability, sex, sexual orientation, or gender identity.            Thank you!     Thank you for choosing [facility]  for your care. Our goal is always to provide you with excellent care. Hearing back from our patients is one way we can continue to improve our services. Please take a few minutes to complete the written survey that you may receive in the mail after your visit with us. Thank you!             Your Updated Medication List - Protect others around you: Learn how to safely use, store and throw away your medicines at www.disposemymeds.org.          This list is accurate as of 11/8/18  3:41 PM.  Always use your most recent med list.                   Brand Name Dispense Instructions for use Diagnosis    ADVIL 200 MG capsule   Generic drug:  ibuprofen      Take 200 mg by mouth every 4 hours as needed. For menstral cramps        levothyroxine 75 MCG tablet    SYNTHROID/LEVOTHROID    90 tablet    Take 1 tablet (75 mcg) by mouth daily    Hypothyroidism due to acquired atrophy of thyroid

## 2018-11-08 NOTE — PROGRESS NOTES
SUBJECTIVE:   CC: Ave Willoughby is an 53 year old woman who presents for preventive health visit.     52 y/o F with h/o Lumbar SS, hypothyroid and h/o TAMIKA here for AFE.   LMP was 5/2018     Physical   Annual:     Getting at least 3 servings of Calcium per day:  Yes    Bi-annual eye exam:  Yes    Dental care twice a year:  Yes    Sleep apnea or symptoms of sleep apnea:  None    Diet:  Regular (no restrictions)    Frequency of exercise:  2-3 days/week    Duration of exercise:  45-60 minutes    Taking medications regularly:  Yes    Medication side effects:  None    Additional concerns today:  No        No concerns     Today's PHQ-2 Score:   PHQ-2 ( 1999 [employer]) 11/8/2018   Q1: Little interest or pleasure in doing things 0   Q2: Feeling down, depressed or hopeless 0   PHQ-2 Score 0   Q1: Little interest or pleasure in doing things Not at all   Q2: Feeling down, depressed or hopeless Not at all   PHQ-2 Score 0       Abuse: Current or Past(Physical, Sexual or Emotional)- No  Do you feel safe in your environment - Yes    Social History   Substance Use Topics     Smoking status: Never Smoker     Smokeless tobacco: Never Used     Alcohol use Yes      Comment: couple drinks per day     Alcohol Use 11/8/2018   If you drink alcohol do you typically have greater than 3 drinks per day OR greater than 7 drinks per week? No   No flowsheet data found.    Reviewed orders with patient.  Reviewed health maintenance and updated orders accordingly - Yes  Labs reviewed in EPIC  BP Readings from Last 3 Encounters:   11/08/18 135/82   08/09/18 109/64   06/12/18 132/74    Wt Readings from Last 3 Encounters:   11/08/18 204 lb (92.5 kg)   08/07/18 185 lb (83.9 kg)   06/12/18 199 lb (90.3 kg)                  Patient Active Problem List   Diagnosis     CARDIOVASCULAR SCREENING; LDL GOAL LESS THAN 130     Lumbar pain     Spondylolisthesis of lumbar region     Lumbar foraminal stenosis     Sacroiliac pain     Family history of colon cancer      Iron deficiency anemia due to chronic blood loss     Hypothyroidism due to acquired atrophy of thyroid     Past Surgical History:   Procedure Laterality Date     APPENDECTOMY  4/11/2011    Dr Lona Duncan     COLONOSCOPY  9-29-08     COLONOSCOPY N/A 8/9/2018    Procedure: COMBINED COLONOSCOPY, SINGLE OR MULTIPLE BIOPSY/POLYPECTOMY BY BIOPSY;;  Surgeon: Lc Cervantes MD;  Location: [facility]     COLONOSCOPY WITH CO2 INSUFFLATION N/A 8/9/2018    Procedure: COLONOSCOPY WITH CO2 INSUFFLATION;  COLON SCREEN/ PATTIE;  Surgeon: Lc Cervantes MD;  Location: [facility]     ENDOSCOPY UPPER, COLONOSCOPY, COMBINED  7/1/2013    Procedure: COMBINED ENDOSCOPY UPPER, COLONOSCOPY;  COLONOSCOPY SCREEN-FAMILY HX OF COLON CANCER/ EGD-UPPER ENDOSCOPY-UPPER GI SYMPTOMS/ PATTIE;  Surgeon: Lc Cervantes MD;  Location: [facility]     ESOPHAGOSCOPY, GASTROSCOPY, DUODENOSCOPY (EGD), COMBINED  7/1/2013    Procedure: COMBINED ESOPHAGOSCOPY, GASTROSCOPY, DUODENOSCOPY (EGD), BIOPSY SINGLE OR MULTIPLE;;  Surgeon: Lc Cervantes MD;  Location: [facility]       Social History   Substance Use Topics     Smoking status: Never Smoker     Smokeless tobacco: Never Used     Alcohol use Yes      Comment: 5 drinks a week     Family History   Problem Relation Age of Onset     Breast Cancer Mother      Thyroid Disease Mother      Cancer - colorectal Father      Cancer Father      brain     Diabetes Maternal Grandmother      Cardiovascular Paternal Grandmother      Thyroid Disease Sister          Current Outpatient Prescriptions   Medication Sig Dispense Refill     ibuprofen (ADVIL) 200 MG capsule Take 200 mg by mouth every 4 hours as needed. For menstral cramps       levothyroxine (SYNTHROID/LEVOTHROID) 75 MCG tablet TAKE 1 TABLET BY MOUTH DAILY 90 tablet 3     Allergies   Allergen Reactions     Penicillins      Recent Labs   Lab Test  06/12/18   1433  09/07/17   1622   10/15/15   0808   10/09/13   0712  06/06/13   1821   04/10/11   LDL   --    --    --   73   --   83   --    --    --    HDL   --    --    --   46*   --   41*   --    --    --    TRIG   --    --    --   79   --   92   --    --    --    ALT   --    --    --    --    --    --   24   --   11   CR   --   0.85   --   0.83   --    --    --    < >  0.80   GFRESTIMATED   --   70   --   72   --    --    --    < >   --    GFRESTBLACK   --   85   --   88   --    --    --    < >   --    POTASSIUM   --   4.3   --   4.1   --    --    --    < >  3.5   TSH  2.88  3.42   < >  2.69   < >  3.63   --    < >   --     < > = values in this interval not displayed.        Patient over age 50, mutual decision to screen reflected in health maintenance.    Pertinent mammograms are reviewed under the imaging tab.  History of abnormal Pap smear:   Last 3 Pap and HPV Results:   PAP / HPV Latest Ref Rng & Units 9/2/2016   PAP - NIL   HPV 16 DNA NEG Negative   HPV 18 DNA NEG Negative   OTHER HR HPV NEG Negative     PAP / HPV Latest Ref Rng & Units 9/2/2016   PAP - NIL   HPV 16 DNA NEG Negative   HPV 18 DNA NEG Negative   OTHER HR HPV NEG Negative     Reviewed and updated as needed this visit by clinical staff         Reviewed and updated as needed this visit by Provider          Past Medical History:   Diagnosis Date     Family history of colon cancer     due 2013 for      Hypothyroid      Sciatic nerve disease       Past Surgical History:   Procedure Laterality Date     APPENDECTOMY  4/11/2011    Dr Lona Duncan     COLONOSCOPY  9-29-08     COLONOSCOPY N/A 8/9/2018    Procedure: COMBINED COLONOSCOPY, SINGLE OR MULTIPLE BIOPSY/POLYPECTOMY BY BIOPSY;;  Surgeon: Lc Cervantes MD;  Location: [facility]     COLONOSCOPY WITH CO2 INSUFFLATION N/A 8/9/2018    Procedure: COLONOSCOPY WITH CO2 INSUFFLATION;  COLON SCREEN/ PATTIE;  Surgeon: Lc Cervantes MD;  Location: [facility]     ENDOSCOPY UPPER, COLONOSCOPY, COMBINED  7/1/2013    Procedure: COMBINED ENDOSCOPY UPPER, COLONOSCOPY;  COLONOSCOPY  "SCREEN-FAMILY HX OF COLON CANCER/ EGD-UPPER ENDOSCOPY-UPPER GI SYMPTOMS/ PATTIE;  Surgeon: Lc Cervantes MD;  Location: [facility]     ESOPHAGOSCOPY, GASTROSCOPY, DUODENOSCOPY (EGD), COMBINED  7/1/2013    Procedure: COMBINED ESOPHAGOSCOPY, GASTROSCOPY, DUODENOSCOPY (EGD), BIOPSY SINGLE OR MULTIPLE;;  Surgeon: Lc Cervantes MD;  Location: [facility]       Review of Systems   Constitutional: Negative for chills.   HENT: Negative for congestion and ear pain.    Eyes: Negative for pain.   Respiratory: Negative for cough.    Cardiovascular: Negative for chest pain.   Gastrointestinal: Negative for abdominal pain, constipation, diarrhea and hematochezia.   Genitourinary: Negative for hematuria.   Neurological: Negative for dizziness.     CONSTITUTIONAL: NEGATIVE for fever, chills, change in weight  INTEGUMENTARY/SKIN: NEGATIVE for worrisome rashes, moles or lesions  EYES: NEGATIVE for vision changes or irritation  ENT: NEGATIVE for ear, mouth and throat problems  RESP: NEGATIVE for significant cough or SOB  BREAST: NEGATIVE for masses, tenderness or discharge  CV: NEGATIVE for chest pain, palpitations or peripheral edema  GI: NEGATIVE for nausea, abdominal pain, heartburn, or change in bowel habits  : NEGATIVE for unusual urinary or vaginal symptoms. No vaginal bleeding.  MUSCULOSKELETAL: NEGATIVE for significant arthralgias or myalgia  NEURO: NEGATIVE for weakness, dizziness or paresthesias  ENDOCRINE: NEGATIVE for temperature intolerance, skin/hair changes  PSYCHIATRIC: NEGATIVE for changes in mood or affect      OBJECTIVE:   LMP 06/24/2012  Physical Exam   /82 (BP Location: Right arm, Patient Position: Sitting, Cuff Size: Adult Regular)  Pulse 67  Temp 97.1  F (36.2  C) (Oral)  Ht 5' 0.5\" (1.537 m)  Wt 204 lb (92.5 kg)  LMP 06/24/2012  SpO2 99%  BMI 39.19 kg/m2    GENERAL APPEARANCE: healthy, alert and no distress  EYES: Eyes grossly normal to inspection, PERRL and conjunctivae and sclerae " normal  HENT: ear canals and TM's normal, nose and mouth without ulcers or lesions, oropharynx clear and oral mucous membranes moist  NECK: no adenopathy, no asymmetry, masses, or scars and thyroid normal to palpation  RESP: lungs clear to auscultation - no rales, rhonchi or wheezes  BREAST: normal without masses, tenderness or nipple discharge and no palpable axillary masses or adenopathy  CV: regular rate and rhythm, normal S1 S2, no S3 or S4, no murmur, click or rub, no peripheral edema and peripheral pulses strong  ABDOMEN: soft, nontender, no he  MS: no muscupatosplenomegaly, no masses and bowel sounds normal   (female): normal female external genitalia, normal urethral meatus, vaginal mucosal atrophy noted, normal cervix, adnexae, and uterus without masses or abnormal discharge   (female): bimanual only  MS: no musculoskeletal defects are noted and gait is age appropriate without ataxia  SKIN: no suspicious lesions or rashes  NEURO: Normal strength and tone, sensory exam grossly normal, mentation intact and speech normal  PSYCH: mentation appears normal and affect normal/bright    Diagnostic Test Results:  No results found for this or any previous visit (from the past 24 hour(s)).  See orders   TSH pending    ASSESSMENT/PLAN:       ICD-10-CM    1. Routine general medical examination at a health care facility Z00.00    2. Hypothyroidism due to acquired atrophy of thyroid E03.4 TSH with free T4 reflex     **Glucose FUTURE anytime     levothyroxine (SYNTHROID/LEVOTHROID) 75 MCG tablet     Lipid panel reflex to direct LDL Non-fasting   3. CARDIOVASCULAR SCREENING; LDL GOAL LESS THAN 130 Z13.6 Lipid panel reflex to direct LDL Non-fasting       COUNSELING:  Reviewed preventive health counseling, as reflected in patient instructions       discussed shingrix.       BP Readings from Last 1 Encounters:   08/09/18 109/64     Estimated body mass index is 31.76 kg/(m^2) as calculated from the following:    Height as of  "8/7/18: 5' 4\" (1.626 m).    Weight as of 8/7/18: 185 lb (83.9 kg).      Weight management plan: working on weight loss     reports that she has never smoked. She has never used smokeless tobacco.      Counseling Resources:  ATP IV Guidelines  Pooled Cohorts Equation Calculator  Breast Cancer Risk Calculator  FRAX Risk Assessment  ICSI Preventive Guidelines  Dietary Guidelines for Americans, 2010  USDA's MyPlate  ASA Prophylaxis  Lung CA Screening    Tayla Espinal MD  [facility]  Answers for HPI/ROS submitted by the patient on 11/8/2018   PHQ-2 Score: 0    "

## 2018-11-09 NOTE — PROGRESS NOTES
Ave Willoughby    Cholesterol, thyroid and glucose all look great for another year!  It was nice to see you, have a great Thanksgiving!    Sincerely,     MALKA BLANKENSHIP M.D.

## 2018-11-21 ENCOUNTER — RADIANT APPOINTMENT (OUTPATIENT)
Dept: MAMMOGRAPHY | Facility: CLINIC | Age: 53
End: 2018-11-21
Attending: INTERNAL MEDICINE
Payer: COMMERCIAL

## 2018-11-21 DIAGNOSIS — Z12.31 VISIT FOR SCREENING MAMMOGRAM: ICD-10-CM

## 2018-11-21 PROCEDURE — 77067 SCR MAMMO BI INCL CAD: CPT | Mod: TC

## 2018-12-11 ENCOUNTER — MYC MEDICAL ADVICE (OUTPATIENT)
Dept: FAMILY MEDICINE | Facility: CLINIC | Age: 53
End: 2018-12-11

## 2018-12-11 DIAGNOSIS — R93.0 ABNORMAL CT SCAN, SINUS: Primary | ICD-10-CM

## 2018-12-19 ENCOUNTER — OFFICE VISIT (OUTPATIENT)
Dept: OTOLARYNGOLOGY | Facility: CLINIC | Age: 53
End: 2018-12-19
Payer: COMMERCIAL

## 2018-12-19 VITALS
WEIGHT: 205 LBS | BODY MASS INDEX: 35 KG/M2 | HEART RATE: 70 BPM | HEIGHT: 64 IN | DIASTOLIC BLOOD PRESSURE: 88 MMHG | OXYGEN SATURATION: 100 % | SYSTOLIC BLOOD PRESSURE: 149 MMHG | RESPIRATION RATE: 12 BRPM

## 2018-12-19 DIAGNOSIS — J34.1 MUCOUS RETENTION CYST OF MAXILLARY SINUS: Primary | ICD-10-CM

## 2018-12-19 PROCEDURE — 99242 OFF/OP CONSLTJ NEW/EST SF 20: CPT | Performed by: OTOLARYNGOLOGY

## 2018-12-19 ASSESSMENT — MIFFLIN-ST. JEOR: SCORE: 1514.87

## 2018-12-19 NOTE — PROGRESS NOTES
I am seeing this patient in consultation for abnormal sinus CT at the request of the provider Alicia Colon.    Chief Complaint - sinus opacification on CT    History of Present Illness - Ave Willoughby is a 53 year old female who presents for evaluation of possible sinusitis and abnormality on a sinus CT. The patient describes no symptoms. She is looking at getting a dental implant on the left maxillary first molar. One year ago she had an infected tooth abscess at this site. The tooth was ultimately removed 8/2018. She has had sinus infections in the past, but not a lot and none lately. Has some postnasal drainage, but no obvious infection or pus. She uses oral antihistamines as needed for seasonal allergies. No prior history of sinus surgery. She has a sinus and tooth CT scan performed at the periodontist office.  I reviewed the images with her.  She has a likely left maxillary sinus mucous retention cyst measuring approximately 2 cm.  In addition she has a very small hyperdensity at the floor of both maxillary sinuses possibly may be a old tooth root.  She describes she had teeth removed as a pre-teenager to make more room in her mouth.  It could be from that as I noticed on her exam she is missing one premolar at all sites.    Past Medical History -   Patient Active Problem List   Diagnosis     CARDIOVASCULAR SCREENING; LDL GOAL LESS THAN 130     Lumbar pain     Spondylolisthesis of lumbar region     Lumbar foraminal stenosis     Sacroiliac pain     Family history of colon cancer     Iron deficiency anemia due to chronic blood loss     Hypothyroidism due to acquired atrophy of thyroid       Current Medications -   Current Outpatient Medications:      ibuprofen (ADVIL) 200 MG capsule, Take 200 mg by mouth every 4 hours as needed. For menstral cramps, Disp: , Rfl:      levothyroxine (SYNTHROID/LEVOTHROID) 75 MCG tablet, Take 1 tablet (75 mcg) by mouth daily, Disp: 90 tablet, Rfl: 3    Allergies -   Allergies    Allergen Reactions     Penicillins        Social History -   Social History     Socioeconomic History     Marital status:      Spouse name: Not on file     Number of children: Not on file     Years of education: Not on file     Highest education level: Not on file   Social Needs     Financial resource strain: Not on file     Food insecurity - worry: Not on file     Food insecurity - inability: Not on file     Transportation needs - medical: Not on file     Transportation needs - non-medical: Not on file   Occupational History     Not on file   Tobacco Use     Smoking status: Never Smoker     Smokeless tobacco: Never Used   Substance and Sexual Activity     Alcohol use: Yes     Comment: 5 drinks a week     Drug use: No     Sexual activity: Yes     Partners: Female   Other Topics Concern     Parent/sibling w/ CABG, MI or angioplasty before 65F 55M? No   Social History Narrative    Lives with wife and an adopted son (born 1998)        Works for [employer] as .       Family History -   Family History   Problem Relation Age of Onset     Breast Cancer Mother      Thyroid Disease Mother      Cancer - colorectal Father      Cancer Father         brain     Diabetes Maternal Grandmother      Cardiovascular Paternal Grandmother      Thyroid Disease Sister      Review of Systems - As per HPI and PMHx, otherwise 7 system review of the head and neck is negative.    Physical Exam  General - The patient is nontoxic, in no distress. Alert and oriented to person and place, answers questions and cooperates with examination appropriately.   Neurologic - CN II-XII are intact. No focal neurologic deficits.   Voice and Breathing - The patient was breathing comfortably without the use of accessory muscles. There was no wheezing, stridor, or stertor.  The patients voice was clear and strong.  Eyes - Extraocular movements intact.  Sclera were not icteric or injected, conjunctiva were pink  and moist.  Mouth - Examination of the oral cavity showed pink, healthy oral mucosa. No lesions or ulcerations noted.  The tongue was mobile and midline.  She is missing her left maxillary first molar.  I see no evidence of infection.  Throat - The walls of the oropharynx were smooth, symmetric, and had no lesions or ulcerations.  No postnasal drainage.  The uvula was midline on elevation.  Nose - External contour is symmetric, no gross deflection or scars.  Nasal mucosa is pink and moist with no abnormal mucus.  The septum was deviated to the left some, turbinates of normal size and position.  No polyps, masses, or purulence noted on examination.  Neck - Palpation of the occipital, submental, submandibular, internal jugular chain, and supraclavicular nodes did not demonstrate any abnormal lymph nodes or masses. No parotid masses. Palpation of the thyroid was soft and smooth, with no nodules or goiter appreciated.  The trachea was mobile and midline.      A/P - Ave Willoughby is a 53 year old female with a left maxillary sinus opacification.  She has a sinus and tooth CT scan performed at the periodontist office.  I reviewed the images with her.  This is likely a left maxillary sinus mucous retention cyst. There is no evidence of infection in her history, exam, or on the scan. She has a very small hyperdensity at the floor of both maxillary sinuses (the right side is larger than the left). This maybe an old tooth root. She describes she had teeth removed as a pre-teenager to make more room in her mouth.  The hyperdensity could be from premolars removed as it is on both sides, and I noticed on her exam she is missing one premolar at all sites.     Mucus retention cysts do not require removal and are not infectious.  They are always asymptomatic.  Therefore I do not think this left maxillary sinus mucous retention cyst or the very tiny hyperdensity will cause any complication with regards to her left maxillary  sinus lift and dental implant.  Her periodontist can proceed with the procedure.      Wellington Guadalupe MD  Otolaryngology  [facility]

## 2018-12-19 NOTE — LETTER
12/19/2018         RE: Ave Willoughby  [address] [address] [address]        Dear Colleague,    Thank you for referring your patient, Ave Willoughby, to the [facility]. Please see a copy of my visit note below.    I am seeing this patient in consultation for abnormal sinus CT at the request of the provider Alicia Colon.    Chief Complaint - sinus opacification on CT    History of Present Illness - Ave Willoughby is a 53 year old female who presents for evaluation of possible sinusitis and abnormality on a sinus CT. The patient describes no symptoms. She is looking at getting a dental implant on the left maxillary first molar. One year ago she had an infected tooth abscess at this site. The tooth was ultimately removed 8/2018. She has had sinus infections in the past, but not a lot and none lately. Has some postnasal drainage, but no obvious infection or pus. She uses oral antihistamines as needed for seasonal allergies. No prior history of sinus surgery. She has a sinus and tooth CT scan performed at the periodontist office.  I reviewed the images with her.  She has a likely left maxillary sinus mucous retention cyst measuring approximately 2 cm.  In addition she has a very small hyperdensity at the floor of both maxillary sinuses possibly may be a old tooth root.  She describes she had teeth removed as a pre-teenager to make more room in her mouth.  It could be from that as I noticed on her exam she is missing one premolar at all sites.    Past Medical History -   Patient Active Problem List   Diagnosis     CARDIOVASCULAR SCREENING; LDL GOAL LESS THAN 130     Lumbar pain     Spondylolisthesis of lumbar region     Lumbar foraminal stenosis     Sacroiliac pain     Family history of colon cancer     Iron deficiency anemia due to chronic blood loss     Hypothyroidism due to acquired atrophy of thyroid       Current Medications -   Current Outpatient Medications:      ibuprofen  (ADVIL) 200 MG capsule, Take 200 mg by mouth every 4 hours as needed. For menstral cramps, Disp: , Rfl:      levothyroxine (SYNTHROID/LEVOTHROID) 75 MCG tablet, Take 1 tablet (75 mcg) by mouth daily, Disp: 90 tablet, Rfl: 3    Allergies -   Allergies   Allergen Reactions     Penicillins        Social History -   Social History     Socioeconomic History     Marital status:      Spouse name: Not on file     Number of children: Not on file     Years of education: Not on file     Highest education level: Not on file   Social Needs     Financial resource strain: Not on file     Food insecurity - worry: Not on file     Food insecurity - inability: Not on file     Transportation needs - medical: Not on file     Transportation needs - non-medical: Not on file   Occupational History     Not on file   Tobacco Use     Smoking status: Never Smoker     Smokeless tobacco: Never Used   Substance and Sexual Activity     Alcohol use: Yes     Comment: 5 drinks a week     Drug use: No     Sexual activity: Yes     Partners: Female   Other Topics Concern     Parent/sibling w/ CABG, MI or angioplasty before 65F 55M? No   Social History Narrative    Lives with wife and an adopted son (born 1998)        Works for [employer] as .       Family History -   Family History   Problem Relation Age of Onset     Breast Cancer Mother      Thyroid Disease Mother      Cancer - colorectal Father      Cancer Father         brain     Diabetes Maternal Grandmother      Cardiovascular Paternal Grandmother      Thyroid Disease Sister      Review of Systems - As per HPI and PMHx, otherwise 7 system review of the head and neck is negative.    Physical Exam  General - The patient is nontoxic, in no distress. Alert and oriented to person and place, answers questions and cooperates with examination appropriately.   Neurologic - CN II-XII are intact. No focal neurologic deficits.   Voice and Breathing - The  patient was breathing comfortably without the use of accessory muscles. There was no wheezing, stridor, or stertor.  The patients voice was clear and strong.  Eyes - Extraocular movements intact.  Sclera were not icteric or injected, conjunctiva were pink and moist.  Mouth - Examination of the oral cavity showed pink, healthy oral mucosa. No lesions or ulcerations noted.  The tongue was mobile and midline.  She is missing her left maxillary first molar.  I see no evidence of infection.  Throat - The walls of the oropharynx were smooth, symmetric, and had no lesions or ulcerations.  No postnasal drainage.  The uvula was midline on elevation.  Nose - External contour is symmetric, no gross deflection or scars.  Nasal mucosa is pink and moist with no abnormal mucus.  The septum was deviated to the left some, turbinates of normal size and position.  No polyps, masses, or purulence noted on examination.  Neck - Palpation of the occipital, submental, submandibular, internal jugular chain, and supraclavicular nodes did not demonstrate any abnormal lymph nodes or masses. No parotid masses. Palpation of the thyroid was soft and smooth, with no nodules or goiter appreciated.  The trachea was mobile and midline.      A/P - Ave Willoughby is a 53 year old female with a left maxillary sinus opacification.  She has a sinus and tooth CT scan performed at the periodontist office.  I reviewed the images with her.  This is likely a left maxillary sinus mucous retention cyst. There is no evidence of infection in her history, exam, or on the scan. She has a very small hyperdensity at the floor of both maxillary sinuses (the right side is larger than the left). This maybe an old tooth root. She describes she had teeth removed as a pre-teenager to make more room in her mouth.  The hyperdensity could be from premolars removed as it is on both sides, and I noticed on her exam she is missing one premolar at all sites.     Mucus retention  cysts do not require removal and are not infectious.  They are always asymptomatic.  Therefore I do not think this left maxillary sinus mucous retention cyst or the very tiny hyperdensity will cause any complication with regards to her left maxillary sinus lift and dental implant.  Her periodontist can proceed with the procedure.      Wellington Guadalupe MD  Otolaryngology  [facility]      Again, thank you for allowing me to participate in the care of your patient.        Sincerely,        Wellington Guadalupe MD

## 2018-12-19 NOTE — PATIENT INSTRUCTIONS
General Scheduling Information  To schedule your CT/MRI scan, please contact [place] [facility] at [phone]   [address] [place] [address]    To schedule your Surgery, please contact our Specialty Schedulers at [phone]    ENT Clinic Locations Clinic Hours Telephone Number     Rajesh Betancur  [address] [place] [address]   Tuesday:       8:00am -- 4:00pm    Wednesday:  8:00am - 4:00pm   To schedule an appointment with   Dr. Guadalupe,   please contact our   Specialty Scheduling Department at:     [phone]       [facility]  [address] [place] [address]   Friday:          8:00am - 4:00pm         Urgent Care Locations Clinic Hours Telephone Numbers     Rajesh Mclaughlin  [address] [place] [address]     Monday-Friday:     11:00pm - 9:00pm    Saturday-Sunday:  9:00am - 5:00pm   [phone]     R[facility]  [address] [place] [address]     Monday-Friday:      5:00pm - 9:00pm     Saturday-Sunday:  9:00am - 5:00pm   [phone]

## 2019-02-01 ENCOUNTER — MYC MEDICAL ADVICE (OUTPATIENT)
Dept: FAMILY MEDICINE | Facility: CLINIC | Age: 54
End: 2019-02-01

## 2019-02-01 ENCOUNTER — E-VISIT (OUTPATIENT)
Dept: FAMILY MEDICINE | Facility: CLINIC | Age: 54
End: 2019-02-01
Payer: COMMERCIAL

## 2019-02-01 DIAGNOSIS — J01.90 ACUTE SINUSITIS, RECURRENCE NOT SPECIFIED, UNSPECIFIED LOCATION: Primary | ICD-10-CM

## 2019-02-01 PROCEDURE — 99444 ZZC PHYSICIAN ONLINE EVALUATION & MANAGEMENT SERVICE: CPT | Performed by: FAMILY MEDICINE

## 2019-02-01 RX ORDER — AZITHROMYCIN 250 MG/1
TABLET, FILM COATED ORAL
Qty: 6 TABLET | Refills: 1 | Status: SHIPPED | OUTPATIENT
Start: 2019-02-01 | End: 2019-03-16

## 2019-02-26 ENCOUNTER — TRANSFERRED RECORDS (OUTPATIENT)
Dept: HEALTH INFORMATION MANAGEMENT | Facility: CLINIC | Age: 54
End: 2019-02-26

## 2019-03-16 ENCOUNTER — OFFICE VISIT (OUTPATIENT)
Dept: URGENT CARE | Facility: URGENT CARE | Age: 54
End: 2019-03-16
Payer: COMMERCIAL

## 2019-03-16 VITALS
DIASTOLIC BLOOD PRESSURE: 91 MMHG | WEIGHT: 200 LBS | TEMPERATURE: 100.8 F | BODY MASS INDEX: 34.33 KG/M2 | HEART RATE: 74 BPM | OXYGEN SATURATION: 99 % | SYSTOLIC BLOOD PRESSURE: 146 MMHG

## 2019-03-16 DIAGNOSIS — J40 BRONCHITIS: ICD-10-CM

## 2019-03-16 DIAGNOSIS — J02.9 SORE THROAT: ICD-10-CM

## 2019-03-16 DIAGNOSIS — R06.2 WHEEZING: ICD-10-CM

## 2019-03-16 DIAGNOSIS — B34.9 VIRAL SYNDROME: Primary | ICD-10-CM

## 2019-03-16 LAB
DEPRECATED S PYO AG THROAT QL EIA: NORMAL
SPECIMEN SOURCE: NORMAL

## 2019-03-16 PROCEDURE — 87880 STREP A ASSAY W/OPTIC: CPT | Performed by: FAMILY MEDICINE

## 2019-03-16 PROCEDURE — 87081 CULTURE SCREEN ONLY: CPT | Performed by: FAMILY MEDICINE

## 2019-03-16 PROCEDURE — 99213 OFFICE O/P EST LOW 20 MIN: CPT | Performed by: FAMILY MEDICINE

## 2019-03-16 RX ORDER — ALBUTEROL SULFATE 90 UG/1
2 AEROSOL, METERED RESPIRATORY (INHALATION) EVERY 6 HOURS PRN
Qty: 8.5 G | Refills: 0 | Status: SHIPPED | OUTPATIENT
Start: 2019-03-16 | End: 2021-11-22

## 2019-03-16 NOTE — PROGRESS NOTES
Acute Illness   Acute illness concerns: fever, sore throat, cough, body aches and fatigue  Onset: x 4 days    Fever: YES    Chills/Sweats: no    Headache (location?): YES    Sinus Pressure:no    Conjunctivitis:  no    Ear Pain: no    Rhinorrhea: YES    Congestion: YES    Sore Throat: YES     Cough: YES-non-productive    Wheeze: YES    Decreased Appetite: no    Nausea: no    Vomiting: no    Diarrhea:  no    Dysuria/Freq.: no    Fatigue/Achiness: YES    Sick/Strep Exposure: YES- recently returned from vacation in [place]     Therapies Tried and outcome: Tylenol      Otherwise healthy without any known underlying chronic lung disease. Non-smoker.    Problem list and histories reviewed & adjusted, as indicated.  Additional history: as documented    Patient Active Problem List   Diagnosis     CARDIOVASCULAR SCREENING; LDL GOAL LESS THAN 130     Lumbar pain     Spondylolisthesis of lumbar region     Lumbar foraminal stenosis     Sacroiliac pain     Family history of colon cancer     Iron deficiency anemia due to chronic blood loss     Hypothyroidism due to acquired atrophy of thyroid     Past Surgical History:   Procedure Laterality Date     APPENDECTOMY  4/11/2011    Dr Lona Duncan     COLONOSCOPY  9-29-08     COLONOSCOPY N/A 8/9/2018    Procedure: COMBINED COLONOSCOPY, SINGLE OR MULTIPLE BIOPSY/POLYPECTOMY BY BIOPSY;;  Surgeon: Lc Cervantes MD;  Location: [facility]     COLONOSCOPY WITH CO2 INSUFFLATION N/A 8/9/2018    Procedure: COLONOSCOPY WITH CO2 INSUFFLATION;  COLON SCREEN/ PATTIE;  Surgeon: Lc Cervantes MD;  Location: [facility]     ENDOSCOPY UPPER, COLONOSCOPY, COMBINED  7/1/2013    Procedure: COMBINED ENDOSCOPY UPPER, COLONOSCOPY;  COLONOSCOPY SCREEN-FAMILY HX OF COLON CANCER/ EGD-UPPER ENDOSCOPY-UPPER GI SYMPTOMS/ PATTIE;  Surgeon: Lc Cervantes MD;  Location: [facility]     ESOPHAGOSCOPY, GASTROSCOPY, DUODENOSCOPY (EGD), COMBINED  7/1/2013    Procedure: COMBINED ESOPHAGOSCOPY,  GASTROSCOPY, DUODENOSCOPY (EGD), BIOPSY SINGLE OR MULTIPLE;;  Surgeon: Lc Cervantes MD;  Location:  OR       Social History     Tobacco Use     Smoking status: Never Smoker     Smokeless tobacco: Never Used   Substance Use Topics     Alcohol use: Yes     Comment: 5 drinks a week     Family History   Problem Relation Age of Onset     Breast Cancer Mother      Thyroid Disease Mother      Cancer - colorectal Father      Cancer Father         brain     Diabetes Maternal Grandmother      Cardiovascular Paternal Grandmother      Thyroid Disease Sister          Current Outpatient Medications   Medication Sig Dispense Refill     albuterol (PROAIR HFA/PROVENTIL HFA/VENTOLIN HFA) 108 (90 Base) MCG/ACT inhaler Inhale 2 puffs into the lungs every 6 hours as needed for shortness of breath / dyspnea or wheezing 8.5 g 0     ibuprofen (ADVIL) 200 MG capsule Take 200 mg by mouth every 4 hours as needed. For menstral cramps       levothyroxine (SYNTHROID/LEVOTHROID) 75 MCG tablet Take 1 tablet (75 mcg) by mouth daily 90 tablet 3     Allergies   Allergen Reactions     Penicillins      Recent Labs   Lab Test 11/08/18  1542 06/12/18  1433 09/07/17  1622  10/15/15  0808  10/09/13  0712 06/06/13  1821  04/10/11   LDL 83  --   --   --  73  --  83  --   --   --    HDL 52  --   --   --  46*  --  41*  --   --   --    TRIG 130  --   --   --  79  --  92  --   --   --    ALT  --   --   --   --   --   --   --  24  --  11   CR  --   --  0.85  --  0.83  --   --   --    < > 0.80   GFRESTIMATED  --   --  70  --  72  --   --   --    < >  --    GFRESTBLACK  --   --  85  --  88  --   --   --    < >  --    POTASSIUM  --   --  4.3  --  4.1  --   --   --    < > 3.5   TSH 3.51 2.88 3.42   < > 2.69   < > 3.63  --    < >  --     < > = values in this interval not displayed.             ROS:  All others are negative except as above.      OBJECTIVE:     BP (!) 146/91   Pulse 74   Temp 100.8  F (38.2  C) (Oral)   Wt 90.7 kg (200 lb)   LMP  06/24/2012   SpO2 99%   BMI 34.33 kg/m    Body mass index is 34.33 kg/m .  GENERAL: healthy, alert and no distress  EYES: Eyes grossly normal to inspection, PERRL and conjunctivae and sclerae normal  HENT: ear canals and TM's normal, nose and mouth without ulcers or lesions  NECK: no adenopathy, no asymmetry, masses, or scars and thyroid normal to palpation  RESP: scattered expiratory wheezes, otherwise lungs are clear.  CV: regular rate and rhythm, normal S1 S2, no S3 or S4, no murmur, click or rub, no peripheral edema and peripheral pulses strong  PSYCH: mentation appears normal, affect normal/bright    Diagnostic Test Results:  Reviewed and discussed with patient prior to discharge.  Results for orders placed or performed in visit on 03/16/19   Strep, Rapid Screen   Result Value Ref Range    Specimen Description Throat     Rapid Strep A Screen       NEGATIVE: No Group A streptococcal antigen detected by immunoassay, await culture report.         ASSESSMENT/PLAN:   Ave was seen today for cough.    Diagnoses and all orders for this visit:    Acute Bronchitis with Viral syndrome  -Outside the window for testing for Influenza  -Reassured that this is self limiting.   -Recommended supportive management. -Increase fluid intake. Plenty of rest.   -Tylenol+/-Ibuprofen as needed for discomfort and fever.    Wheezing  -     albuterol (PROAIR HFA/PROVENTIL HFA/VENTOLIN HFA) 108 (90 Base) MCG/ACT inhaler; Inhale 2 puffs into the lungs every 6 hours as needed for shortness of breath / dyspnea or wheezing    Sore throat  -     Strep, Rapid Screen  -     Beta strep group A culture  Throat lozenges or sprays (such as Chloraseptic) help reduce pain. Gargling with warm salt water will also reduce throat pain. Dissolve 1/2 teaspoon of salt in 1 glass of warm water. This may be useful just before meals.      Patient education and Handout given       Follow up if symptoms fail to improve in 1 week or worsen.      The patient  was in agreement with the plan today and had no questions or concerns prior to leaving the clinic.        Sara Perry MD  [facility]

## 2019-03-16 NOTE — PATIENT INSTRUCTIONS
Patient Education     Viral Bronchitis with Wheezing (Adult)    Bronchitis is an infection of the air passages. It often occurs during a cold and is usually caused by a virus. Symptoms include cough with mucus (phlegm) and low-grade fever. This illness is contagious during the first few days and is spread through the air by coughing and sneezing, or by direct contact (touching the sick person and then touching your own eyes, nose, or mouth).  If there is a lot of inflammation, air flow is restricted. The air passages may also go into spasm, especially if you have asthma. This causes wheezing and difficulty breathing even in people who do not have asthma.  Bronchitis usually lasts 7 to 14 days. The wheezing should improve with treatment during the first week. An inhaler is often prescribed to relax the air passages and stop wheezing. Antibiotics will be prescribed if your doctor thinks there is also a secondary bacterial infection.  Home care    If symptoms are severe, rest at home for the first 2 to 3 days. When you go back to your usual activities, don't let yourself get too tired.    Dont s'moke. Also avoid being exposed to secondhand smoke.    You may use over-the-counter medicine to control fever or pain, unless another medicine was prescribed. Note: If you have chronic liver or kidney disease or have ever had a stomach ulcer or gastrointestinal bleeding, talk with your healthcare provider before using these medicines. Also talk to your provider if you are taking medicine to prevent blood clots.) Aspirin should never be given to anyone younger than 18 years of age who is ill with a viral infection or fever. It may cause severe liver or brain damage.    Your appetite may be poor, so a light diet is fine. Stay well hydrated by drinking 6 to 8 glasses of fluids per day (such as water, soft drinks, sports drinks, juices, tea, or soup). Extra fluids will help loosen secretions in the nose and  lungs.    Over-the-counter cough, cold, and sore-throat medicines will not shorten the length of the illness, but they may be helpful to reduce symptoms. (Note: Don't use decongestants if you have high blood pressure.)    If you were given an inhaler, use it exactly as directed. If you need to use it more often than prescribed, your condition may be worsening. If this happens, contact your healthcare provider.    If prescribed, finish all antibiotic medicine, even if you are feeling better after only a few days.  Follow-up care  Follow up with your healthcare provider, or as advised. If you had an X-ray or ECG (electrocardiogram), a specialist will review it. You will be notified of any new findings that may affect your care.  If you are age 65 or older, or if you have a chronic lung disease or condition that affects your immune system, or you smoke, ask your healthcare provider about getting a pneumococcal vaccine and a yearly flu shot (influenza vaccine).  When to seek medical advice  Call your healthcare provider right away if any of these occur:    Fever of 100.4 F (38 C) or higher, or as directed by your healthcare provider    Coughing up increasing amounts of colored sputum    Weakness, drowsiness, headache, facial pain, ear pain, or a stiff neck  Call 911  Call 911 if any of these occur.    Coughing up blood    Worsening weakness, drowsiness, headache, or stiff neck    Increased wheezing not helped with medication, shortness of breath, or pain with breathing   Date Last Reviewed: 6/1/2018 2000-2018 [employer]. [address] [place] [address], [place] [address]. All rights reserved. This information is not intended as a substitute for professional medical care. Always follow your healthcare professional's instructions.           Patient Education     Viral Syndrome (Adult)  A viral illness may cause a number of symptoms such as fever. Other symptoms depend on the part of the body that the virus  "affects. If it settles in your nose, throat, and lungs, it may cause cough, sore throat, congestion, runny nose, headache, earache and other ear symptoms, or shortness of breath. If it settles in your stomach and intestinal tract, it may cause nausea, vomiting, cramping, and diarrhea. Sometimes it causes generalized symptoms like \"aching all over,\" feeling tired, loss of energy, or loss of appetite.  A viral illness usually lasts anywhere from several days to several weeks, but sometimes it lasts longer. In some cases, a more serious infection can look like a viral syndrome in the first few days of the illness. You may need another exam and additional tests to know the difference. Watch for the warning signs listed below for when to seek medical advice.  Home care  Follow these guidelines for taking care of yourself at home:    If symptoms are severe, rest at home for the first 2 to 3 days.    Stay away from cigarette smoke - both your smoke and the smoke from others.    You may use over-the-counter acetaminophen or ibuprofen for fever, muscle aching, and headache, unless another medicine was prescribed for this. If you have chronic liver or kidney disease or ever had a stomach ulcer or gastrointestinal bleeding, talk with your healthcare provider before using these medicines. No one who is younger than 18 and ill with a fever should take aspirin. It may cause severe disease or death.    Your appetite may be poor, so a light diet is fine. Avoid dehydration by drinking 8 to 12, 8-ounce glasses of fluids each day. This may include water; orange juice; lemonade; apple, grape, and cranberry juice; clear fruit drinks; electrolyte replacement and sports drinks; and decaffeinated teas and coffee. If you have been diagnosed with a kidney disease, ask your healthcare provider how much and what types of fluids you should drink to prevent dehydration. If you have kidney disease, drinking too much fluid can cause it build up in " the your body and be dangerous to your health.    Over-the-counter remedies won't shorten the length of the illness but may be helpful for symptoms such as cough, sore throat, nasal and sinus congestion, or diarrhea. Don't use decongestants if you have high blood pressure.  Follow-up care  Follow up with your healthcare provider if you do not improve over the next week.  Call 911  Call 911 if any of the following occur:    Convulsion    Feeling weak, dizzy, or like you are going to faint    Chest pain, or more than mild shortness of breath   When to seek medical advice  Call your healthcare provider right away if any of these occur:    Cough with lots of colored sputum (mucus) or blood in your sputum    Chest pain, shortness of breath, wheezing, or trouble breathing    Severe headache; face, neck, or ear pain    Severe, constant pain in the lower right side of your belly (abdominal)    Continued vomiting (can t keep liquids down)    Frequent diarrhea (more than 5 times a day); blood (red or black color) or mucus in diarrhea    Feeling weak, dizzy, or like you are going to faint    Extreme thirst    Fever of 100.4 F (38 C) or higher, or as directed by your healthcare provider  Date Last Reviewed: 4/1/2018 2000-2018 [employer]. [address] [place] [address], [place] [address]. All rights reserved. This information is not intended as a substitute for professional medical care. Always follow your healthcare professional's instructions.

## 2019-03-17 LAB
BACTERIA SPEC CULT: NORMAL
SPECIMEN SOURCE: NORMAL

## 2019-04-23 ENCOUNTER — TRANSFERRED RECORDS (OUTPATIENT)
Dept: HEALTH INFORMATION MANAGEMENT | Facility: CLINIC | Age: 54
End: 2019-04-23

## 2019-10-04 ENCOUNTER — HEALTH MAINTENANCE LETTER (OUTPATIENT)
Age: 54
End: 2019-10-04

## 2019-11-21 ENCOUNTER — OFFICE VISIT (OUTPATIENT)
Dept: FAMILY MEDICINE | Facility: CLINIC | Age: 54
End: 2019-11-21
Payer: COMMERCIAL

## 2019-11-21 VITALS
BODY MASS INDEX: 35.79 KG/M2 | WEIGHT: 202 LBS | HEIGHT: 63 IN | SYSTOLIC BLOOD PRESSURE: 139 MMHG | HEART RATE: 73 BPM | OXYGEN SATURATION: 97 % | TEMPERATURE: 97.8 F | DIASTOLIC BLOOD PRESSURE: 85 MMHG

## 2019-11-21 DIAGNOSIS — Z23 NEED FOR SHINGLES VACCINE: ICD-10-CM

## 2019-11-21 DIAGNOSIS — G89.29 CHRONIC RIGHT SI JOINT PAIN: ICD-10-CM

## 2019-11-21 DIAGNOSIS — E03.4 HYPOTHYROIDISM DUE TO ACQUIRED ATROPHY OF THYROID: ICD-10-CM

## 2019-11-21 DIAGNOSIS — Z86.2 HISTORY OF ANEMIA: ICD-10-CM

## 2019-11-21 DIAGNOSIS — Z23 NEED FOR DIPHTHERIA-TETANUS-PERTUSSIS (TDAP) VACCINE: ICD-10-CM

## 2019-11-21 DIAGNOSIS — Z00.01 ENCOUNTER FOR GENERAL ADULT MEDICAL EXAMINATION WITH ABNORMAL FINDINGS: Primary | ICD-10-CM

## 2019-11-21 DIAGNOSIS — M53.3 CHRONIC RIGHT SI JOINT PAIN: ICD-10-CM

## 2019-11-21 DIAGNOSIS — Z00.00 ROUTINE GENERAL MEDICAL EXAMINATION AT A HEALTH CARE FACILITY: ICD-10-CM

## 2019-11-21 LAB
ERYTHROCYTE [DISTWIDTH] IN BLOOD BY AUTOMATED COUNT: 13.8 % (ref 10–15)
HCT VFR BLD AUTO: 43.7 % (ref 35–47)
HGB BLD-MCNC: 14.5 G/DL (ref 11.7–15.7)
MCH RBC QN AUTO: 30.1 PG (ref 26.5–33)
MCHC RBC AUTO-ENTMCNC: 33.2 G/DL (ref 31.5–36.5)
MCV RBC AUTO: 91 FL (ref 78–100)
PLATELET # BLD AUTO: 339 10E9/L (ref 150–450)
RBC # BLD AUTO: 4.81 10E12/L (ref 3.8–5.2)
WBC # BLD AUTO: 6.9 10E9/L (ref 4–11)

## 2019-11-21 PROCEDURE — 90750 HZV VACC RECOMBINANT IM: CPT | Performed by: INTERNAL MEDICINE

## 2019-11-21 PROCEDURE — 84443 ASSAY THYROID STIM HORMONE: CPT | Performed by: INTERNAL MEDICINE

## 2019-11-21 PROCEDURE — 84439 ASSAY OF FREE THYROXINE: CPT | Performed by: INTERNAL MEDICINE

## 2019-11-21 PROCEDURE — 90472 IMMUNIZATION ADMIN EACH ADD: CPT | Performed by: INTERNAL MEDICINE

## 2019-11-21 PROCEDURE — 90471 IMMUNIZATION ADMIN: CPT | Performed by: INTERNAL MEDICINE

## 2019-11-21 PROCEDURE — 85027 COMPLETE CBC AUTOMATED: CPT | Performed by: INTERNAL MEDICINE

## 2019-11-21 PROCEDURE — 90715 TDAP VACCINE 7 YRS/> IM: CPT | Performed by: INTERNAL MEDICINE

## 2019-11-21 PROCEDURE — 80048 BASIC METABOLIC PNL TOTAL CA: CPT | Performed by: INTERNAL MEDICINE

## 2019-11-21 PROCEDURE — 36415 COLL VENOUS BLD VENIPUNCTURE: CPT | Performed by: INTERNAL MEDICINE

## 2019-11-21 PROCEDURE — 99213 OFFICE O/P EST LOW 20 MIN: CPT | Mod: 25 | Performed by: INTERNAL MEDICINE

## 2019-11-21 PROCEDURE — 99396 PREV VISIT EST AGE 40-64: CPT | Mod: 25 | Performed by: INTERNAL MEDICINE

## 2019-11-21 RX ORDER — FAMOTIDINE 20 MG/1
20 TABLET, FILM COATED ORAL DAILY
COMMUNITY

## 2019-11-21 ASSESSMENT — ENCOUNTER SYMPTOMS
JOINT SWELLING: 0
SORE THROAT: 0
NERVOUS/ANXIOUS: 0
CHILLS: 0
FEVER: 0
CONSTIPATION: 0
HEMATURIA: 0
DYSURIA: 0
HEADACHES: 0
BREAST MASS: 0
EYE PAIN: 0
HEMATOCHEZIA: 0
PALPITATIONS: 0
NAUSEA: 0
ARTHRALGIAS: 1
FREQUENCY: 0
MYALGIAS: 0
SHORTNESS OF BREATH: 0
HEARTBURN: 1
WEAKNESS: 0
DIARRHEA: 0
DIZZINESS: 0
PARESTHESIAS: 0
COUGH: 0
ABDOMINAL PAIN: 0

## 2019-11-21 ASSESSMENT — MIFFLIN-ST. JEOR: SCORE: 1472.46

## 2019-11-21 NOTE — PATIENT INSTRUCTIONS

## 2019-11-21 NOTE — NURSING NOTE
Prior to immunization administration, verified patients identity using patient s name and date of birth. Please see Immunization Activity for additional information.     Screening Questionnaire for Adult Immunization    Are you sick today?   No   Do you have allergies to medications, food, a vaccine component or latex?   No   Have you ever had a serious reaction after receiving a vaccination?   No   Do you have a long-term health problem with heart disease, lung disease, asthma, kidney disease, metabolic disease (e.g. diabetes), anemia, or other blood disorder?   No   Do you have cancer, leukemia, HIV/AIDS, or any other immune system problem?   No   In the past 3 months, have you taken medications that affect  your immune system, such as prednisone, other steroids, or anticancer drugs; drugs for the treatment of rheumatoid arthritis, Crohn s disease, or psoriasis; or have you had radiation treatments?   No   Have you had a seizure, or a brain or other nervous system problem?   No   During the past year, have you received a transfusion of blood or blood     products, or been given immune (gamma) globulin or antiviral drug?   No   For women: Are you pregnant or is there a chance you could become        pregnant during the next month?   No   Have you received any vaccinations in the past 4 weeks?   No     Immunization questionnaire answers were all negative.        Per orders of Dr. Espinal, injection of Tdap, Shingrix given by Geena Lyons CMA. Patient instructed to remain in clinic for 15 minutes afterwards, and to report any adverse reaction to me immediately.     Vaccine information supplied.    Screening performed by Geena Lyons CMA on 11/21/2019 at 4:15 PM.

## 2019-11-21 NOTE — PROGRESS NOTES
SUBJECTIVE:   CC: Ave Willoughby is an 54 year old woman who presents for preventive health visit.     53 y/o F here for AFE.   h/o  Lumbar SS, hypothyroid and h/o TAMIKA.   LMP 5/2019... none since.    .     Complains of Right sided buttock pain,achy joints.      Pain is right on ischium.  Has had it for years.  More at night, able to sit because she can change positions.   Does not awaken her.  Has tried physical therapy and a steroid shot 8-10 years ago.  The steroid shot helped a lot    Past 6 months, the pain is a lot worse.  Chiropracter and exercises help a little. But still very painful.      Healthy Habits:     Getting at least 3 servings of Calcium per day:  Yes    Bi-annual eye exam:  Yes    Dental care twice a year:  Yes    Sleep apnea or symptoms of sleep apnea:  None    Diet:  Regular (no restrictions)    Frequency of exercise:  4-5 days/week    Duration of exercise:  45-60 minutes    Taking medications regularly:  Yes    PHQ-2 Total Score: 0    Additional concerns today:  Yes          Right sided buttock pain,achy joints ,     Today's PHQ-2 Score:   PHQ-2 ( 1999 [employer]) 11/21/2019   Q1: Little interest or pleasure in doing things 0   Q2: Feeling down, depressed or hopeless 0   PHQ-2 Score 0   Q1: Little interest or pleasure in doing things Not at all   Q2: Feeling down, depressed or hopeless Not at all   PHQ-2 Score 0       Abuse: Current or Past(Physical, Sexual or Emotional)- No  Do you feel safe in your environment? Yes    Have you ever done Advance Care Planning? (For example, a Health Directive, POLST, or a discussion with a medical provider or your loved ones about your wishes): Yes, patient states has an Advance Care Planning document and will bring a copy to the clinic.    Social History     Tobacco Use     Smoking status: Never Smoker     Smokeless tobacco: Never Used   Substance Use Topics     Alcohol use: Yes     Comment: 5 drinks a week         Alcohol Use 11/21/2019   Prescreen: >3  drinks/day or >7 drinks/week? No   Prescreen: >3 drinks/day or >7 drinks/week? -       Reviewed orders with patient.  Reviewed health maintenance and updated orders accordingly - Yes  Lab work is in process  Labs reviewed in EPIC  BP Readings from Last 3 Encounters:   11/21/19 139/85   03/16/19 (!) 146/91   12/19/18 149/88    Wt Readings from Last 3 Encounters:   11/21/19 91.6 kg (202 lb)   03/16/19 90.7 kg (200 lb)   12/19/18 93 kg (205 lb)                  Patient Active Problem List   Diagnosis     CARDIOVASCULAR SCREENING; LDL GOAL LESS THAN 130     Lumbar pain     Spondylolisthesis of lumbar region     Lumbar foraminal stenosis     Sacroiliac pain     Family history of colon cancer     Iron deficiency anemia due to chronic blood loss     Hypothyroidism due to acquired atrophy of thyroid     Past Surgical History:   Procedure Laterality Date     APPENDECTOMY  4/11/2011    Dr Lona Duncan     COLONOSCOPY  9-29-08     COLONOSCOPY N/A 8/9/2018    Procedure: COMBINED COLONOSCOPY, SINGLE OR MULTIPLE BIOPSY/POLYPECTOMY BY BIOPSY;;  Surgeon: Lc Cervantes MD;  Location: [facility]     COLONOSCOPY WITH CO2 INSUFFLATION N/A 8/9/2018    Procedure: COLONOSCOPY WITH CO2 INSUFFLATION;  COLON SCREEN/ PATTIE;  Surgeon: Lc Cervantes MD;  Location: [facility]     ENDOSCOPY UPPER, COLONOSCOPY, COMBINED  7/1/2013    Procedure: COMBINED ENDOSCOPY UPPER, COLONOSCOPY;  COLONOSCOPY SCREEN-FAMILY HX OF COLON CANCER/ EGD-UPPER ENDOSCOPY-UPPER GI SYMPTOMS/ PATTIE;  Surgeon: Lc Cervantes MD;  Location: [facility]     ESOPHAGOSCOPY, GASTROSCOPY, DUODENOSCOPY (EGD), COMBINED  7/1/2013    Procedure: COMBINED ESOPHAGOSCOPY, GASTROSCOPY, DUODENOSCOPY (EGD), BIOPSY SINGLE OR MULTIPLE;;  Surgeon: Lc Cervantes MD;  Location: [facility]       Social History     Tobacco Use     Smoking status: Never Smoker     Smokeless tobacco: Never Used   Substance Use Topics     Alcohol use: Yes     Comment: 5 drinks a week      Family History   Problem Relation Age of Onset     Breast Cancer Mother      Thyroid Disease Mother      Cancer - colorectal Father      Cancer Father         brain     Diabetes Maternal Grandmother      Cardiovascular Paternal Grandmother      Thyroid Disease Sister          Current Outpatient Medications   Medication Sig Dispense Refill     famotidine (PEPCID) 20 MG tablet Take 20 mg by mouth daily       ibuprofen (ADVIL) 200 MG capsule Take 200 mg by mouth every 4 hours as needed. For menstral cramps       levothyroxine (SYNTHROID/LEVOTHROID) 75 MCG tablet Take 1 tablet (75 mcg) by mouth daily 90 tablet 3     Rhubarb (ESTROVERA) 4 MG TABS        Tdap, tetanus-diptheria-acell pertussis, (BOOSTRIX) 5-2.5-18.5 LF-MCG/0.5 injection Inject 0.5 mLs into the muscle once for 1 dose 0.5 mL 0     albuterol (PROAIR HFA/PROVENTIL HFA/VENTOLIN HFA) 108 (90 Base) MCG/ACT inhaler Inhale 2 puffs into the lungs every 6 hours as needed for shortness of breath / dyspnea or wheezing (Patient not taking: Reported on 11/21/2019) 8.5 g 0     Allergies   Allergen Reactions     Penicillins      Recent Labs   Lab Test 11/08/18  1542 06/12/18  1433 09/07/17  1622  10/15/15  0808  10/09/13  0712 06/06/13  1821   LDL 83  --   --   --  73  --  83  --    HDL 52  --   --   --  46*  --  41*  --    TRIG 130  --   --   --  79  --  92  --    ALT  --   --   --   --   --   --   --  24   CR  --   --  0.85  --  0.83  --   --   --    GFRESTIMATED  --   --  70  --  72  --   --   --    GFRESTBLACK  --   --  85  --  88  --   --   --    POTASSIUM  --   --  4.3  --  4.1  --   --   --    TSH 3.51 2.88 3.42   < > 2.69   < > 3.63  --     < > = values in this interval not displayed.        Mammogram Screening: Patient over age 50, mutual decision to screen reflected in health maintenance.    Pertinent mammograms are reviewed under the imaging tab.  History of abnormal Pap smear: NO - age 30-65 PAP every 5 years with negative HPV co-testing recommended  PAP / HPV  Latest Ref Rng & Units 9/2/2016   PAP - NIL   HPV 16 DNA NEG Negative   HPV 18 DNA NEG Negative   OTHER HR HPV NEG Negative     Reviewed and updated as needed this visit by clinical staff         Reviewed and updated as needed this visit by Provider          Past Medical History:   Diagnosis Date     Family history of colon cancer     due 2013 for      Hypothyroid      Sciatic nerve disease       Past Surgical History:   Procedure Laterality Date     APPENDECTOMY  4/11/2011    Dr Lona Duncan     COLONOSCOPY  9-29-08     COLONOSCOPY N/A 8/9/2018    Procedure: COMBINED COLONOSCOPY, SINGLE OR MULTIPLE BIOPSY/POLYPECTOMY BY BIOPSY;;  Surgeon: Lc Cervantes MD;  [facility]: [facility]     COLONOSCOPY WITH CO2 INSUFFLATION N/A 8/9/2018    Procedure: COLONOSCOPY WITH CO2 INSUFFLATION;  COLON SCREEN/ PATTIE;  Surgeon: Lc Cervantes MD;  [facility]: [facility]     ENDOSCOPY UPPER, COLONOSCOPY, COMBINED  7/1/2013    Procedure: COMBINED ENDOSCOPY UPPER, COLONOSCOPY;  COLONOSCOPY SCREEN-FAMILY HX OF COLON CANCER/ EGD-UPPER ENDOSCOPY-UPPER GI SYMPTOMS/ PATTIE;  Surgeon: Lc Cervantes MD;  Location: [facility]     ESOPHAGOSCOPY, GASTROSCOPY, DUODENOSCOPY (EGD), COMBINED  7/1/2013    Procedure: COMBINED ESOPHAGOSCOPY, GASTROSCOPY, DUODENOSCOPY (EGD), BIOPSY SINGLE OR MULTIPLE;;  Surgeon: Lc Cervantes MD;  Location: [facility]       Review of Systems   Constitutional: Negative for chills and fever.   HENT: Negative for congestion, ear pain, hearing loss and sore throat.    Eyes: Negative for pain and visual disturbance.   Respiratory: Negative for cough and shortness of breath.    Cardiovascular: Negative for chest pain, palpitations and peripheral edema.   Gastrointestinal: Positive for heartburn. Negative for abdominal pain, constipation, diarrhea, hematochezia and nausea.   Breasts:  Negative for tenderness, breast mass and discharge.   Genitourinary: Negative for dysuria, frequency, genital sores,  hematuria, pelvic pain, urgency, vaginal bleeding and vaginal discharge.   Musculoskeletal: Positive for arthralgias. Negative for joint swelling and myalgias.   Skin: Negative for rash.   Neurological: Negative for dizziness, weakness, headaches and paresthesias.   Psychiatric/Behavioral: Positive for mood changes. The patient is not nervous/anxious.            OBJECTIVE:   LMP 06/24/2012      Physical Exam     GENERAL APPEARANCE: healthy, alert and no distress  EYES: Eyes grossly normal to inspection, PERRL and conjunctivae and sclerae normal  HENT: ear canals and TM's normal, nose and mouth without ulcers or lesions, oropharynx clear and oral mucous membranes moist  NECK: no adenopathy, no asymmetry, masses, or scars and thyroid normal to palpation  RESP: lungs clear to auscultation - no rales, rhonchi or wheezes  BREAST: normal without masses, tenderness or nipple discharge and no palpable axillary masses or adenopathy  CV: regular rate and rhythm, normal S1 S2, no S3 or S4, no murmur, click or rub, no peripheral edema and peripheral pulses strong  ABDOMEN: soft, nontender, no hepatosplenomegaly, no masses and bowel sounds normal   (female): normal female external genitalia, normal urethral meatus, vaginal mucosal atrophy noted, normal cervix, adnexae, and uterus without masses or abnormal discharge   (female): bimanual only  MS: no musculoskeletal defects are noted and gait is age appropriate without ataxia   Pain to palpation at lower R SI joint area.  No pain to palpation along lumbar/lumbosacral areas.  No pain at hip bursae, no pain at ischium.     SKIN: no suspicious lesions or rashes  NEURO: Normal strength and tone, sensory exam grossly normal, mentation intact and speech normal  PSYCH: mentation appears normal and affect normal/bright    Diagnostic Test Results:  Labs reviewed in Epic  See orders.     ASSESSMENT/PLAN:       ICD-10-CM    1. Encounter for general adult medical examination with  "abnormal findings Z00.01    2. Hypothyroidism due to acquired atrophy of thyroid E03.4 TSH with free T4 reflex     OFFICE/OUTPT VISIT,EST,LEVL III   3. History of anemia Z86.2 CBC with platelets     Basic metabolic panel   4. Routine general medical examination at a health care facility Z00.00    5. Need for diphtheria-tetanus-pertussis (Tdap) vaccine Z23 DISCONTINUED: Tdap, tetanus-diptheria-acell pertussis, (BOOSTRIX) 5-2.5-18.5 LF-MCG/0.5 injection   6. Need for shingles vaccine Z23 ZOSTER VACCINE RECOMBINANT ADJUVANTED IM NJX   7. Chronic right SI joint pain M53.3 PAIN MANAGEMENT REFERRAL    G89.29 OFFICE/OUTPT VISIT,EST,LEVL III       COUNSELING:  Reviewed preventive health counseling, as reflected in patient instructions       Regular exercise       Immunizations    Shingrix, none available.        Patient Instructions     Schedule Shingles #2 for 8 - 12 weeks from now (late January - mid February 2020)    Return to clinic next year for annual physical    Pain team will call you       Estimated body mass index is 34.33 kg/m  as calculated from the following:    Height as of 12/19/18: 1.626 m (5' 4\").    Weight as of 3/16/19: 90.7 kg (200 lb).    Weight management plan: has maintained a 20# weight loss     reports that she has never smoked. She has never used smokeless tobacco.      Counseling Resources:  ATP IV Guidelines  Pooled Cohorts Equation Calculator  Breast Cancer Risk Calculator  FRAX Risk Assessment  ICSI Preventive Guidelines  Dietary Guidelines for Americans, 2010  USDA's MyPlate  ASA Prophylaxis  Lung CA Screening    Tayla Espinal MD  [facility]  "

## 2019-11-22 ENCOUNTER — TELEPHONE (OUTPATIENT)
Dept: PALLIATIVE MEDICINE | Facility: CLINIC | Age: 54
End: 2019-11-22

## 2019-11-22 LAB
ANION GAP SERPL CALCULATED.3IONS-SCNC: 3 MMOL/L (ref 3–14)
BUN SERPL-MCNC: 11 MG/DL (ref 7–30)
CALCIUM SERPL-MCNC: 9.3 MG/DL (ref 8.5–10.1)
CHLORIDE SERPL-SCNC: 106 MMOL/L (ref 94–109)
CO2 SERPL-SCNC: 29 MMOL/L (ref 20–32)
CREAT SERPL-MCNC: 0.88 MG/DL (ref 0.52–1.04)
GFR SERPL CREATININE-BSD FRML MDRD: 74 ML/MIN/{1.73_M2}
GLUCOSE SERPL-MCNC: 84 MG/DL (ref 70–99)
POTASSIUM SERPL-SCNC: 3.7 MMOL/L (ref 3.4–5.3)
SODIUM SERPL-SCNC: 138 MMOL/L (ref 133–144)
T4 FREE SERPL-MCNC: 1.07 NG/DL (ref 0.76–1.46)
TSH SERPL DL<=0.005 MIU/L-ACNC: 4.36 MU/L (ref 0.4–4)

## 2019-11-22 NOTE — RESULT ENCOUNTER NOTE
Ave Willoughby    Enclosed are your results.  Your labs are normal/stable at this time.     Please call  with any questions.  We can also discuss any questions regarding these labs at your next scheduled visit.    Sincerely,    Tayla Espinal M.D.

## 2019-11-25 ENCOUNTER — ANCILLARY PROCEDURE (OUTPATIENT)
Dept: MAMMOGRAPHY | Facility: CLINIC | Age: 54
End: 2019-11-25
Payer: COMMERCIAL

## 2019-11-25 DIAGNOSIS — Z12.31 VISIT FOR SCREENING MAMMOGRAM: ICD-10-CM

## 2019-11-25 PROCEDURE — 77067 SCR MAMMO BI INCL CAD: CPT | Mod: TC

## 2019-12-03 NOTE — TELEPHONE ENCOUNTER
Pre-screening Questions for Radiology Injections:    Injection to be done at which interventional clinic site? [facility] [place]    Instruct patient to arrive as directed prior to the scheduled appointment time:    [place] minutes before      [place]: 30 minutes before; if IV needed 1 hour before     Procedure ordered by Kylie    Procedure ordered? SI joint injection      Transforaminal Cervical KENDRICK - Dr. Shannen Brooks ONLY    What insurance would patient like us to bill for this procedure? [employer]      Worker's comp or MVA (motor vehicle accident) -Any injection DO NOT SCHEDULE and route to Marianne Ferrera.      [employer] insurance - For SI joint injections, DO NOT SCHEDULE and route Marianne Ferrera.       [employer] - Any injection besides hip/shoulder/knee joint DO NOT SCHEDULE and route to Marianne Ferrera. She will obtain PA and call pt back to schedule procedure or notify pt of denial.       [employer]-Route to Marianne for review      **BCBS- ALL need to be routed to [place] for review if a PA is needed**      IF SCHEDULING IN [place] AND NEEDS A PA, IT IS OKAY TO SCHEDULE. [place] HANDLES THEIR OWN PA'S AFTER THE PATIENT IS SCHEDULED. PLEASE SCHEDULE AT LEAST 1 WEEK OUT SO A PA CAN BE OBTAINED.    Any chance of pregnancy? Not Applicable   If YES, do NOT schedule and route to RN pool    Is an  needed? No     Patient has a drive home? (mandatory) YES: informed    Is patient taking any blood thinners (plavix, coumadin, jantoven, warfarin, heparin, pradaxa or dabigatran )? No   If hold needed, do NOT schedule, route to RN pool     Is patient taking any aspirin products (includes Excedrin and Fiorinal)? No     If more than 325mg/day do NOT schedule; route to RN pool     For CERVICAL procedures, hold all aspirin products for 6 days.     Tell pt that if aspirin product is not held for 6 days, the procedure WILL BE cancelled.      Does the patient have a bleeding or clotting disorder? No      If YES, okay to schedule AND route to RN nurse pool    For any patients with platelet count <100, must be forwarded to provider    Is patient diabetic?  No  If YES, instruct them to bring their glucometer.    Does patient have an active infection or treated for one within the past week? No     Is patient currently taking any antibiotics?  No     For patients on chronic, preventative, or prophylactic antibiotics, procedures may be scheduled.     For patients on antibiotics for active or recent infection:antibiotic course must have been completed for 4 days    Is patient currently taking any steroid medications? (i.e. Prednisone, Medrol)  No     For patients on steroid medications, course must have been completed for 4 days    Reviewed with patient:  If you are started on any steroids or antibiotics between now and your appointment, you must contact us because the procedure may need to be cancelled.  Yes    Is patient actively being treated for cancer or immunocompromised? No  If YES, do NOT schedule and route to RN pool     Are you able to get on and off an exam table with minimal or no assistance? Yes  If NO, do NOT schedule and route to RN pool    Are you able to roll over and lay on your stomach with minimal or no assistance? Yes  If NO, do NOT schedule and route to RN pool     Any allergies to contrast dye, iodine, shellfish, or numbing and steroid medications? No  If YES, route to RN pool AND add allergy information to appointment notes    Allergies: Penicillins      Has the patient had a flu shot or any other vaccinations within 7 days before or after the procedure.  No     Does patient have an MRI/CT?  Not Applicable  Check Procedure Scheduling Grid to see if required.      Was the MRI done within the last 3 years?  NA    If yes, where was the MRI done i.e.[facility], [facility], [facility], [facility] etc?       If no, do not schedule and route to RN pool    If MRI was not done at [facility], [facility] or  [facility] do NOT schedule and route to RN pool.      If pt has an imaging disc, the injection MAY be scheduled but pt has to bring disc to appt.     If they show up without the disc the injection cannot be done    Reminders (please tell patient if applicable):       Instructed pt to arrive 30 minutes early for IV start if required. (Check Procedure Scheduling Grid)  Not Applicable      If celiac plexus block, informed patient NPO for 6 hours and that it is okay to take medications with sips of water, especially blood pressure medications  Not Applicable         If this is for a cervical procedure, informed patient that aspirin needs to be held for 6 days.   Not Applicable      For all patients not having spinal cord stimulator (SCS) trials or radiofrequency ablations (RFAs), informed patient:    IV sedation is not provided for this procedure.  If you feel that an oral anti-anxiety medication is needed, you can discuss this further with your referring provider or primary care provider.  The Pain Clinic provider will discuss specifics of what the procedure includes at your appointment.  Most procedures last 10-20 minutes.  We use numbing medications to help with any discomfort during the procedure.  Not Applicable      Do not schedule procedures requiring IV placement in the first appointment of the day or first appointment after lunch. Do NOT schedule at 0745, 0815 or 1245.       For patients 85 or older we recommend having an adult stay w/ them for the remainder of the day.       Does the patient have any questions?  NO  Ginna Franz  [facility]

## 2019-12-19 ENCOUNTER — RADIOLOGY INJECTION OFFICE VISIT (OUTPATIENT)
Dept: PALLIATIVE MEDICINE | Facility: CLINIC | Age: 54
End: 2019-12-19
Payer: COMMERCIAL

## 2019-12-19 ENCOUNTER — ANCILLARY PROCEDURE (OUTPATIENT)
Dept: RADIOLOGY | Facility: CLINIC | Age: 54
End: 2019-12-19
Attending: PAIN MEDICINE
Payer: COMMERCIAL

## 2019-12-19 VITALS — SYSTOLIC BLOOD PRESSURE: 145 MMHG | HEART RATE: 56 BPM | DIASTOLIC BLOOD PRESSURE: 84 MMHG | OXYGEN SATURATION: 100 %

## 2019-12-19 DIAGNOSIS — M53.3 SACROILIAC JOINT DYSFUNCTION: ICD-10-CM

## 2019-12-19 DIAGNOSIS — M53.3 SACROILIAC JOINT DYSFUNCTION: Primary | ICD-10-CM

## 2019-12-19 PROCEDURE — 27096 INJECT SACROILIAC JOINT: CPT | Mod: RT | Performed by: PAIN MEDICINE

## 2019-12-19 ASSESSMENT — PAIN SCALES - GENERAL: PAINLEVEL: MILD PAIN (3)

## 2019-12-19 NOTE — NURSING NOTE
Pre-procedure Intake    Have you been fasting? NA    If yes, for how long? No     Are you taking a prescribed blood thinner such as coumadin, Plavix, Xarelto?    No    If yes, when did you take your last dose? No     Do you take aspirin?  No    If cervical procedure, have you held aspirin for 6 days?   No     Do you have any allergies to contrast dye, iodine, steroid and/or numbing medications?  NO    Are you currently taking antibiotics or have an active infection?  NO    Have you had a fever/elevated temperature within the past week? NO    Are you currently taking oral steroids? NO    Do you have a ? Yes       Are you pregnant or breastfeeding?  NO    Are the vital signs normal?  Yes    Ben Mccoy MA

## 2019-12-19 NOTE — PATIENT INSTRUCTIONS
[facility]   Procedure Discharge Instructions    Today you saw:  Dr. Alvin Muro     You had an:   R  sacroiliac joint injection   Medications used:  Lidocaine   Bupivacaine   Dexamethasone             Be cautious when walking. Numbness and/or weakness in the lower extremities may occur for up to 6-8 hours after the procedure due to effect of the local anesthetic    Do not drive for 6 hours. The effect of the local anesthetic could slow your reflexes.     You may resume your regular activities after 24 hours    Avoid strenuous activity for the first 24 hours    You may shower, however avoid swimming, tub baths or hot tubs for 24 hours following your procedure    You may have a mild to moderate increase in pain for several days following the injection.    It may take up to 14 days for the steroid medication to start working although you may feel the effect as early as a few days after the procedure.       You may use ice packs for 10-15 minutes, 3 to 4 times a day at the injection site for comfort    Do not use heat to painful areas for 6 to 8 hours. This will give the local anesthetic time to wear off and prevent you from accidentally burning your skin.     Unless you have been directed to avoid the use of anti-inflammatory medications (NSAIDS), you may use medications such as ibuprofen, Aleve or Tylenol for pain control if needed.     If you were fasting, you may resume your normal diet and medications after the procedure    If you have diabetes, check your blood sugar more frequently than usual as your blood sugar may be higher than normal for 10-14 days following a steroid injection. Contact your doctor who manages your diabetes if your blood sugar is higher than usual    Possible side effects of steroids that you may experience include flushing, elevated blood pressure, increased appetite, mild headaches and restlessness.  All of these symptoms will get better with time.    If  you experience any of the following, call the Pain Clinic during work hours (Mon-Friday 8-4:30 pm) at [phone] or the Provider Line after hours at [phone]:  -Fever over 100 degree F  -Swelling, bleeding, redness, drainage, warmth at the injection site  -Progressive weakness or numbness in your legs or arms  -Loss of bowel or bladder function  -Unusual headache that is not relieved by Tylenol or other pain reliever  -Unusual new onset of pain that is not improving

## 2019-12-19 NOTE — PROGRESS NOTES
Pre procedure Diagnosis: SI joint dysfunction    Post procedure Diagnosis: Same  Procedure performed: Right SI joint injection  Anesthesia: none  Complications: none  Operators: Alvin Muro MD     Indications:   Ave Willoughby is a 54 year old female. The patient has a history of chronic low back pain. Other conservative treatments prior to injection include oral medications.    Options/alternatives, benefits and risks were discussed with the patient including bleeding, infection, tissue trauma, exposure to radiation, reaction to medications including seizure, nerve injury, weakness, and numbness.  Questions were answered to her satisfaction and she agrees to proceed. Voluntary informed consent was obtained and signed.     Vitals were reviewed: Yes  Allergies were reviewed:  Yes   Medications were reviewed:  Yes   Pre-procedure pain score: 3/10    Procedure:  After obtaining signed informed consent, the patient was brought into the procedure suite and was placed in a prone position on the procedure table.   A Pause for the Cause was performed.  The patient was prepped and draped in the usual sterile fashion.     After identifying the right SI joint, the C-arm was rotated obliquely to obtain the best view of the inferior angle of the joint.  Then 1 ml of Lidocaine 1%  was used to anesthetize the skin at a skin entry site coaxial with the fluoroscopy beam at this location.  A 22 gauge 3.5 inch needle was advanced under intermittent fluoroscopy until it was felt to enter the SI joint.  Aspiration was negative.    A total of 1ml of Omnipaque-300 was injected, confirming appropriate position, with spread into the intraarticular space, with no intravascular uptake noted.  9ml of Omnipaque was wasted. Location was verified in lateral view.    2 ml of 0.5% bupivacaine with 40mg of Kenalog was injected.  The needle was removed.     Hemostasis was achieved, the area was cleaned, and bandaids were placed when  appropriate.  The patient tolerated the procedure well, and was taken to the recovery room.  Images were saved to PACS.    Post-procedure pain score: 3/10  Follow-up includes:   -f/u phone call in one week  -f/u with referring Physician     Alvin Muro MD  [facility]

## 2019-12-19 NOTE — NURSING NOTE
Discharge Information    IV Discontiued Time:  NA    Amount of Fluid Infused:  NA    Discharge Criteria = When patient returns to baseline or as per MD order    Consciousness:  Pt is fully awake    Circulation:  BP +/- 20% of pre-procedure level    Respiration:  Patient is able to breathe deeply    O2 Sat:  Patient is able to maintain O2 Sat >92% on room air    Activity:  Moves 4 extremities on command    Ambulation:  Patient is able to stand and walk or stand and pivot into wheelchair    Dressing:  Clean/dry or No Dressing    Notes:   Discharge instructions and AVS given to patient    Patient meets criteria for discharge?  YES    Admitted to PCU?  No    Responsible adult present to accompany patient home?  Yes    Signature/Title:    clarita christensen RN  RN Care Coordinator  [facility]

## 2020-02-14 DIAGNOSIS — E03.4 HYPOTHYROIDISM DUE TO ACQUIRED ATROPHY OF THYROID: ICD-10-CM

## 2020-02-17 ENCOUNTER — ALLIED HEALTH/NURSE VISIT (OUTPATIENT)
Dept: NURSING | Facility: CLINIC | Age: 55
End: 2020-02-17
Payer: COMMERCIAL

## 2020-02-17 DIAGNOSIS — Z23 NEED FOR SHINGLES VACCINE: Primary | ICD-10-CM

## 2020-02-17 PROCEDURE — 90471 IMMUNIZATION ADMIN: CPT

## 2020-02-17 PROCEDURE — 90750 HZV VACC RECOMBINANT IM: CPT

## 2020-02-17 PROCEDURE — 99207 ZZC NO CHARGE NURSE ONLY: CPT

## 2020-02-17 RX ORDER — LEVOTHYROXINE SODIUM 75 UG/1
75 TABLET ORAL DAILY
Qty: 90 TABLET | Refills: 3 | Status: SHIPPED | OUTPATIENT
Start: 2020-02-17 | End: 2021-03-30

## 2020-02-17 NOTE — TELEPHONE ENCOUNTER
Routing refill request to provider for review/approval because:  Labs out of range:  TSH    Carley Mendoza RN

## 2020-02-17 NOTE — NURSING NOTE
Prior to immunization administration, verified patients identity using patient s name and date of birth. Please see Immunization Activity for additional information.     Screening Questionnaire for Adult Immunization    Are you sick today?   No   Do you have allergies to medications, food, a vaccine component or latex?   Yes   Have you ever had a serious reaction after receiving a vaccination?   No   Do you have a long-term health problem with heart, lung, kidney, or metabolic disease (e.g., diabetes), asthma, a blood disorder, no spleen, complement component deficiency, a cochlear implant, or a spinal fluid leak?  Are you on long-term aspirin therapy?   No   Do you have cancer, leukemia, HIV/AIDS, or any other immune system problem?   No   Do you have a parent, brother, or sister with an immune system problem?   No   In the past 3 months, have you taken medications that affect  your immune system, such as prednisone, other steroids, or anticancer drugs; drugs for the treatment of rheumatoid arthritis, Crohn s disease, or psoriasis; or have you had radiation treatments?   No   Have you had a seizure, or a brain or other nervous system problem?   No   During the past year, have you received a transfusion of blood or blood    products, or been given immune (gamma) globulin or antiviral drug?   No   For women: Are you pregnant or is there a chance you could become       pregnant during the next month?   No   Have you received any vaccinations in the past 4 weeks?   No     Immunization questionnaire was positive for at least one answer.  Notified .        Per orders of Dr. Espinal, injection of Second Shingrix vaccines  given by Marielena Barclay MA. Patient instructed to remain in clinic for 15 minutes afterwards, and to report any adverse reaction to me immediately.       Screening performed by Marielena Barclay MA on 2/17/2020 at 9:33 AM.  VIS for Second Shingrix vaccines  given on same date of  administration.  Staff signature/Title: Marielena Barclay MA on 2/17/2020 at 9:34 AM

## 2020-02-17 NOTE — TELEPHONE ENCOUNTER
"Requested Prescriptions   Pending Prescriptions Disp Refills     levothyroxine (SYNTHROID/LEVOTHROID) 75 MCG tablet [Pharmacy Med Name: LEVOTHYROXINE 75 MCG TABLET 75 TAB] 90 tablet 3     Sig: TAKE 1 TABLET (75 MCG) BY MOUTH DAILY   Last Written Prescription Date:  11/8/18  Last Fill Quantity: 90,  # refills: 3   Last office visit: 11/21/2019 with prescribing provider:     Future Office Visit:   Next 5 appointments (look out 90 days)    Feb 17, 2020  9:30 AM CST  Nurse Only with [facility] ANCILL[facility]  [facility] [address] [address]  [phone]             Thyroid Protocol Failed - 2/14/2020  5:32 PM        Failed - Normal TSH on file in past 12 months     Recent Labs   Lab Test 11/21/19  1613   TSH 4.36*              Passed - Patient is 12 years or older        Passed - Recent (12 mo) or future (30 days) visit within the authorizing provider's specialty     Patient has had an office visit with the authorizing provider or a provider within the authorizing providers department within the previous 12 mos or has a future within next 30 days. See \"Patient Info\" tab in inbasket, or \"Choose Columns\" in Meds & Orders section of the refill encounter.              Passed - Medication is active on med list        Passed - No active pregnancy on record     If patient is pregnant or has had a positive pregnancy test, please check TSH.          Passed - No positive pregnancy test in past 12 months     If patient is pregnant or has had a positive pregnancy test, please check TSH.            "

## 2020-06-24 ENCOUNTER — VIRTUAL VISIT (OUTPATIENT)
Dept: FAMILY MEDICINE | Facility: OTHER | Age: 55
End: 2020-06-24
Payer: COMMERCIAL

## 2020-06-24 DIAGNOSIS — Z20.822 SUSPECTED COVID-19 VIRUS INFECTION: Primary | ICD-10-CM

## 2020-06-24 PROCEDURE — U0003 INFECTIOUS AGENT DETECTION BY NUCLEIC ACID (DNA OR RNA); SEVERE ACUTE RESPIRATORY SYNDROME CORONAVIRUS 2 (SARS-COV-2) (CORONAVIRUS DISEASE [COVID-19]), AMPLIFIED PROBE TECHNIQUE, MAKING USE OF HIGH THROUGHPUT TECHNOLOGIES AS DESCRIBED BY CMS-2020-01-R: HCPCS | Performed by: FAMILY MEDICINE

## 2020-06-24 PROCEDURE — 99421 OL DIG E/M SVC 5-10 MIN: CPT | Performed by: PHYSICIAN ASSISTANT

## 2020-06-24 NOTE — PROGRESS NOTES
"Date: 2020 10:08:33  Clinician: Aurea Mendez  Clinician NPI: [id number]  Patient: Ave Willoughby  Patient : 1965  Patient Address: [address] [place] [id number]  Patient Phone: [phone]  Visit Protocol: URI  Patient Summary:  Ave is a 54 year old ( : 1965 ) female who initiated a Visit for COVID-19 (Coronavirus) evaluation and screening. When asked the question \"Please sign me up to receive news, health information and promotions from [employer].\", Ave responded \"No\".    Ave states her symptoms started gradually 5-6 days ago. After her symptoms started, they improved and then got worse again.   Her symptoms consist of malaise, a headache, a sore throat, and myalgia.   Symptom details     Sore throat: Ave reports having mild throat pain (1-3 on a 10 point pain scale), does not have exudate on her tonsils, and can swallow liquids. She is not sure if the lymph nodes in her neck are enlarged. A rash has not appeared on the skin since the sore throat started.     Headache: She states the headache is moderate (4-6 on a 10 point pain scale).      Ave denies having wheezing, nausea, teeth pain, ageusia, diarrhea, vomiting, rhinitis, ear pain, chills, anosmia, facial pain or pressure, fever, cough, and nasal congestion. She also denies having recent facial or sinus surgery in the past 60 days, taking antibiotic medication in the past month, and having a sinus infection within the past year. She is not experiencing dyspnea.   Precipitating events  Within the past week, Ave has not been exposed to someone with strep throat. She has not recently been exposed to someone with influenza. Ave has been in close contact with the following high risk individuals: people with asthma, heart disease or diabetes and adults 65 or older.   Pertinent COVID-19 (Coronavirus) information  In the past 14 days, Ave has not worked in a congregate living setting.   She " does not work or volunteer as healthcare worker or a  and does not work or volunteer in a healthcare facility.   Ave also has not lived in a congregate living setting in the past 14 days. She lives with a healthcare worker.   Ave has not had a close contact with a laboratory-confirmed COVID-19 patient within 14 days of symptom onset.   Pertinent medical history  Ave does not get yeast infections when she takes antibiotics.   Ave does not need a return to work/school note.   Weight: 190 lbs   Ave does not smoke or use smokeless tobacco.   Additional information as reported by the patient (free text): Attended two funerals last week--one with approx. 50 people, the other with 25.  I also woke up 5 days ago, extremely dizzy, nauseous and had diarrhea for one day.  Dizziness improved after 24 hours; headache, muscle/joint aches, and fatigue have lasted 4 days.   Weight: 190 lbs    MEDICATIONS: Pepcid AC oral, levothyroxine oral, ALLERGIES: Penicillins  Clinician Response:  Dear Ave,   Your symptoms show that you may have coronavirus (COVID-19). This illness can cause fever, cough and trouble breathing. Many people get a mild case and get better on their own. Some people can get very sick.  What should I do?  We would like to test you for this virus.   1. Please call [phone] to schedule your visit. Explain that you were referred by [facility] to have a COVID-19 test. Be ready [facility].  The following will serve as your written order for this COVID Test, ordered by me, for the indication of suspected COVID [Z20.828]: The test will be ordered in [employer], our electronic health record, after you are scheduled. It will show as ordered and authorized by Jayesh Foss MD.  Order: COVID-19 (Coronavirus) PCR for SYMPTOMATIC testing from [facility].    2. When it's time for your COVID test:  Stay at least 6 feet away from others. (If someone will drive you to your  "test, stay in the backseat, as far away from the  as you can.)   Cover your mouth and nose with a mask, tissue or washcloth.  Go straight to the testing site. Don't make any stops on the way there or back.    3.Starting now: Stay home and away from others (self-isolate) until:   You've had no fever---and no medicine that reduces fever---for 3 full days (72 hours). And...  Your other symptoms have gotten better. For example, your cough or breathing has improved. And...  At least 10 days have passed since your symptoms started.    During this time, don't leave the house except for testing or medical care.   Stay in your own room, even for meals. Use your own bathroom if you can.   Stay away from others in your home. No hugging, kissing or shaking hands. No visitors.  Don't go to work, school or anywhere else.    Clean \"high touch\" surfaces often (doorknobs, counters, handles, etc.). Use a household cleaning spray or wipes. You'll find a full list of  on the EPA website: www.epa.gov/pesticide-registration/list-n-disinfectants-use-against-sars-cov-2.   Cover your mouth and nose with a mask, tissue or washcloth to avoid spreading germs.  Wash your hands and face often. Use soap and water.  Caregivers in these groups are at risk for severe illness due to COVID-19:  o People 65 years and older  o People who live in a nursing home or long-term care facility  o People with chronic disease (lung, heart, cancer, diabetes, kidney, liver, immunologic)  o People who have a weakened immune system, including those who:   Are in cancer treatment  Take medicine that weakens the immune system, such as corticosteroids  Had a bone marrow or organ transplant  Have an immune deficiency  Have poorly controlled HIV or AIDS  Are obese (body mass index of 40 or higher)  Smoke regularly   o Caregivers should wear gloves while washing dishes, handling laundry and cleaning bedrooms and bathrooms.  o Use caution when washing and " drying laundry: Don't shake dirty laundry, and use the warmest water setting that you can.  o For more tips, go to www.cdc.gov/coronavirus/2019-ncov/downloads/10Things.pdf.    How can I take care of myself?   Get lots of rest. Drink extra fluids(unless a doctor has told you not to).  Take Tylenol (acetaminophen) for fever or pain. If you have liver or kidney problems, ask your family doctor if it's okay to take Tylenol.   Adults can take either:    650 mg (two 325 mg pills) every 4 to 6 hours, or...  1,000 mg (two 500 mg pills) every 8 hours as needed.   Note: Don't take more than 3,000 mg in one day. Acetaminophen is found in many medicines (both prescribed and over-the-counter medicines). Read all labels to be sure you don't take too much.   For children, check the Tylenol bottle for the right dose. The dose is based on the child's age or weight.    If you have other health problems (like cancer, heart failure, an organ transplant or severe kidney disease):Call your specialty clinic if you don't feel better in the next 2 days.    Know when to call 911. Emergency warning signs include:   Trouble breathing or shortness of breath Pain or pressure in the chest that doesn't go away Feeling confused like you haven't felt before, or not being able to wake up Bluish-colored lips or face.  Where can I get more information?   [facility] -- About COVID-19: [email or web address]  CDC -- What to Do If You're Sick: www.cdc.gov/coronavirus/2019-ncov/about/steps-when-sick.html  CDC -- Ending Home Isolation: www.cdc.gov/coronavirus/2019-ncov/hcp/disposition-in-home-patients.html  CDC -- Caring for Someone: www.cdc.gov/coronavirus/2019-ncov/if-you-are-sick/care-for-someone.html  [facility] -- Interim Guidance for Hospital Discharge to Home: www.health.[facility].[place]/diseases/coronavirus/hcp/hospdischarge.pdf  [facility] clinical trials (COVID-19 research studies): clinicalaffairs.[facility]-clinical-trials   Below are the COVID-19 hotlines at the [place] Department of Health ([facility]). Interpreters are available.    For health questions: Call 329-888-5804 or 1-107.441.9574 (7 a.m. to 7 p.m.) For questions about schools and childcare: Call 952-659-3329 or 1-674.778.1778 (7 a.m. to 7 p.m.)    Diagnosis: Myalgia  Diagnosis ICD: M79.1

## 2020-06-24 NOTE — LETTER
June 27, 2020        Ave Willoughby  [address] [address]    This letter provides a written record that you were tested for COVID-19 on 6/24/20.       Your result was negative. This means that we didn t find the virus that causes COVID-19 in your sample. A test may show negative when you do actually have the virus. This can happen when the virus is in the early stages of infection, before you feel illness symptoms.    If you have symptoms   Stay home and away from others (self-isolate) until you meet ALL of the guidelines below:    You ve had no fever--and no medicine that reduces fever--for 3 full days (72 hours). And      Your other symptoms have gotten better. For example, your cough or breathing has improved. And     At least 10 days have passed since your symptoms started.    During this time:    Stay home. Don t go to work, school or anywhere else.     Stay in your own room, including for meals. Use your own bathroom if you can.    Stay away from others in your home. No hugging, kissing or shaking hands. No visitors.    Clean  high touch  surfaces often (doorknobs, counters, handles, etc.). Use a household cleaning spray or wipes. You can find a full list on the EPA website at www.epa.gov/pesticide-registration/list-n-disinfectants-use-against-sars-cov-2.    Cover your mouth and nose with a mask, tissue or washcloth to avoid spreading germs.    Wash your hands and face often with soap and water.    Going back to work  Check with your employer for any guidelines to follow for going back to work.    Employers: This document serves as formal notice that your employee tested negative for COVID-19, as of the testing date shown above.

## 2020-06-25 LAB
SARS-COV-2 RNA SPEC QL NAA+PROBE: NOT DETECTED
SPECIMEN SOURCE: NORMAL

## 2020-06-30 ENCOUNTER — TRANSFERRED RECORDS (OUTPATIENT)
Dept: HEALTH INFORMATION MANAGEMENT | Facility: CLINIC | Age: 55
End: 2020-06-30

## 2020-08-21 ENCOUNTER — TRANSFERRED RECORDS (OUTPATIENT)
Dept: HEALTH INFORMATION MANAGEMENT | Facility: CLINIC | Age: 55
End: 2020-08-21

## 2020-10-01 ENCOUNTER — ANCILLARY PROCEDURE (OUTPATIENT)
Dept: MAMMOGRAPHY | Facility: CLINIC | Age: 55
End: 2020-10-01
Attending: INTERNAL MEDICINE
Payer: COMMERCIAL

## 2020-10-01 DIAGNOSIS — Z12.31 VISIT FOR SCREENING MAMMOGRAM: ICD-10-CM

## 2020-10-01 PROCEDURE — 77067 SCR MAMMO BI INCL CAD: CPT | Performed by: RADIOLOGY

## 2020-10-15 PROBLEM — Z79.1 NSAID LONG-TERM USE: Status: ACTIVE | Noted: 2020-10-15

## 2020-10-16 ENCOUNTER — OFFICE VISIT (OUTPATIENT)
Dept: FAMILY MEDICINE | Facility: CLINIC | Age: 55
End: 2020-10-16
Payer: COMMERCIAL

## 2020-10-16 VITALS
WEIGHT: 201 LBS | SYSTOLIC BLOOD PRESSURE: 130 MMHG | HEIGHT: 63 IN | HEART RATE: 73 BPM | TEMPERATURE: 97.6 F | OXYGEN SATURATION: 98 % | BODY MASS INDEX: 35.61 KG/M2 | DIASTOLIC BLOOD PRESSURE: 83 MMHG

## 2020-10-16 DIAGNOSIS — M54.41 ACUTE MIDLINE LOW BACK PAIN WITH RIGHT-SIDED SCIATICA: ICD-10-CM

## 2020-10-16 DIAGNOSIS — Z83.2 FAMILY HISTORY OF IRON DEFICIENCY ANEMIA: ICD-10-CM

## 2020-10-16 DIAGNOSIS — Z00.00 ROUTINE GENERAL MEDICAL EXAMINATION AT A HEALTH CARE FACILITY: ICD-10-CM

## 2020-10-16 DIAGNOSIS — E03.4 HYPOTHYROIDISM DUE TO ACQUIRED ATROPHY OF THYROID: ICD-10-CM

## 2020-10-16 DIAGNOSIS — Z00.01 ENCOUNTER FOR GENERAL ADULT MEDICAL EXAMINATION WITH ABNORMAL FINDINGS: Primary | ICD-10-CM

## 2020-10-16 DIAGNOSIS — Z79.1 NSAID LONG-TERM USE: ICD-10-CM

## 2020-10-16 DIAGNOSIS — Z13.6 CARDIOVASCULAR SCREENING; LDL GOAL LESS THAN 130: ICD-10-CM

## 2020-10-16 LAB
ANION GAP SERPL CALCULATED.3IONS-SCNC: 9 MMOL/L (ref 3–14)
BUN SERPL-MCNC: 9 MG/DL (ref 7–30)
CALCIUM SERPL-MCNC: 8.7 MG/DL (ref 8.5–10.1)
CHLORIDE SERPL-SCNC: 104 MMOL/L (ref 94–109)
CO2 SERPL-SCNC: 26 MMOL/L (ref 20–32)
CREAT SERPL-MCNC: 0.8 MG/DL (ref 0.52–1.04)
ERYTHROCYTE [DISTWIDTH] IN BLOOD BY AUTOMATED COUNT: 13.5 % (ref 10–15)
GFR SERPL CREATININE-BSD FRML MDRD: 82 ML/MIN/{1.73_M2}
GLUCOSE SERPL-MCNC: 86 MG/DL (ref 70–99)
HCT VFR BLD AUTO: 36.4 % (ref 35–47)
HGB BLD-MCNC: 11.8 G/DL (ref 11.7–15.7)
MCH RBC QN AUTO: 28.6 PG (ref 26.5–33)
MCHC RBC AUTO-ENTMCNC: 32.4 G/DL (ref 31.5–36.5)
MCV RBC AUTO: 88 FL (ref 78–100)
PLATELET # BLD AUTO: 428 10E9/L (ref 150–450)
POTASSIUM SERPL-SCNC: 3.9 MMOL/L (ref 3.4–5.3)
RBC # BLD AUTO: 4.13 10E12/L (ref 3.8–5.2)
SODIUM SERPL-SCNC: 139 MMOL/L (ref 133–144)
TSH SERPL DL<=0.005 MIU/L-ACNC: 2.2 MU/L (ref 0.4–4)
WBC # BLD AUTO: 6.5 10E9/L (ref 4–11)

## 2020-10-16 PROCEDURE — 84443 ASSAY THYROID STIM HORMONE: CPT | Performed by: INTERNAL MEDICINE

## 2020-10-16 PROCEDURE — 85027 COMPLETE CBC AUTOMATED: CPT | Performed by: INTERNAL MEDICINE

## 2020-10-16 PROCEDURE — 80048 BASIC METABOLIC PNL TOTAL CA: CPT | Performed by: INTERNAL MEDICINE

## 2020-10-16 PROCEDURE — 36415 COLL VENOUS BLD VENIPUNCTURE: CPT | Performed by: INTERNAL MEDICINE

## 2020-10-16 PROCEDURE — 99396 PREV VISIT EST AGE 40-64: CPT | Performed by: INTERNAL MEDICINE

## 2020-10-16 ASSESSMENT — ENCOUNTER SYMPTOMS
NERVOUS/ANXIOUS: 0
HEMATOCHEZIA: 0
HEARTBURN: 0
DIZZINESS: 0
HEADACHES: 0
HEMATURIA: 0
CHILLS: 0
DIARRHEA: 0
FEVER: 0
CONSTIPATION: 0
FREQUENCY: 0
ABDOMINAL PAIN: 0
EYE PAIN: 0
COUGH: 0

## 2020-10-16 ASSESSMENT — MIFFLIN-ST. JEOR: SCORE: 1462.92

## 2020-10-16 NOTE — PATIENT INSTRUCTIONS

## 2020-10-16 NOTE — PROGRESS NOTES
SUBJECTIVE:   CC: Ave Willoughby is an 55 year old woman who presents for preventive health visit.     56 y/o F here for AFE.  H/o h/o  Lumbar SS, hypothyroid and h/o TAMIKA.  Has some djd in lower body, had R troch bursa injection this past year via [facility] , it helped...    Weight has been stable    BP has been stable  Ongoing R leg numbness in L5/S1 patterns.  Will awaker her at night.  She has had MRIs in past showing bilateral L5/S1 moderate foramen stenosis.       On FMLA til December.  Mom has dementia, she and sister and working out future living arrangements and helping her get to Doctor appts, etc.      Patient has been advised of split billing requirements and indicates understanding: Yes  Healthy Habits:     Getting at least 3 servings of Calcium per day:  Yes    Bi-annual eye exam:  Yes    Dental care twice a year:  Yes    Sleep apnea or symptoms of sleep apnea:  None    Diet:  Regular (no restrictions)    Frequency of exercise:  6-7 days/week    Duration of exercise:  15-30 minutes    Taking medications regularly:  Yes    Medication side effects:  None    PHQ-2 Total Score: 0    Additional concerns today:  Yes      Numbness and tingling in right leg and foot x 2 months,     Today's PHQ-2 Score:   PHQ-2 ( 1999 Pfizer) 10/16/2020   Q1: Little interest or pleasure in doing things 0   Q2: Feeling down, depressed or hopeless 0   PHQ-2 Score 0   Q1: Little interest or pleasure in doing things Not at all   Q2: Feeling down, depressed or hopeless Not at all   PHQ-2 Score 0       Abuse: Current or Past (Physical, Sexual or Emotional) - No  Do you feel safe in your environment? Yes        Social History     Tobacco Use     Smoking status: Never Smoker     Smokeless tobacco: Never Used   Substance Use Topics     Alcohol use: Yes     Comment: 5 drinks a week     If you drink alcohol do you typically have >3 drinks per day or >7 drinks per week? No    Alcohol Use 10/16/2020   Prescreen: >3 drinks/day or >7  drinks/week? No   Prescreen: >3 drinks/day or >7 drinks/week? -       Reviewed orders with patient.  Reviewed health maintenance and updated orders accordingly - Yes  Lab work is in process  Labs reviewed in EPIC  BP Readings from Last 3 Encounters:   10/16/20 130/83   12/19/19 (!) 145/84   11/21/19 139/85    Wt Readings from Last 3 Encounters:   10/16/20 91.2 kg (201 lb)   11/21/19 91.6 kg (202 lb)   03/16/19 90.7 kg (200 lb)                  Patient Active Problem List   Diagnosis     CARDIOVASCULAR SCREENING; LDL GOAL LESS THAN 130     Lumbar pain     Spondylolisthesis of lumbar region     Lumbar foraminal stenosis     Sacroiliac pain     Family history of colon cancer     Iron deficiency anemia due to chronic blood loss     Hypothyroidism due to acquired atrophy of thyroid     NSAID long-term use     Past Surgical History:   Procedure Laterality Date     APPENDECTOMY  4/11/2011    Dr Lona Duncan     COLONOSCOPY  9-29-08     COLONOSCOPY N/A 8/9/2018    Procedure: COMBINED COLONOSCOPY, SINGLE OR MULTIPLE BIOPSY/POLYPECTOMY BY BIOPSY;;  Surgeon: Lc Cervantes MD;  Location: [facility]     COLONOSCOPY WITH CO2 INSUFFLATION N/A 8/9/2018    Procedure: COLONOSCOPY WITH CO2 INSUFFLATION;  COLON SCREEN/ PATTIE;  Surgeon: Lc Cervantes MD;  Location: [facility]     ENDOSCOPY UPPER, COLONOSCOPY, COMBINED  7/1/2013    Procedure: COMBINED ENDOSCOPY UPPER, COLONOSCOPY;  COLONOSCOPY SCREEN-FAMILY HX OF COLON CANCER/ EGD-UPPER ENDOSCOPY-UPPER GI SYMPTOMS/ PATTIE;  Surgeon: Lc Cervantes MD;  Location: [facility]     ESOPHAGOSCOPY, GASTROSCOPY, DUODENOSCOPY (EGD), COMBINED  7/1/2013    Procedure: COMBINED ESOPHAGOSCOPY, GASTROSCOPY, DUODENOSCOPY (EGD), BIOPSY SINGLE OR MULTIPLE;;  Surgeon: Lc Cervantes MD;  Location: [facility]       Social History     Tobacco Use     Smoking status: Never Smoker     Smokeless tobacco: Never Used   Substance Use Topics     Alcohol use: Yes     Comment: 5 drinks a  week     Family History   Problem Relation Age of Onset     Breast Cancer Mother      Thyroid Disease Mother      Cancer - colorectal Father      Cancer Father         brain     Diabetes Maternal Grandmother      Cardiovascular Paternal Grandmother      Thyroid Disease Sister          Current Outpatient Medications   Medication Sig Dispense Refill     famotidine (PEPCID) 20 MG tablet Take 20 mg by mouth daily       ibuprofen (ADVIL) 200 MG capsule Take 200 mg by mouth every 4 hours as needed. For menstral cramps       levothyroxine (SYNTHROID/LEVOTHROID) 75 MCG tablet TAKE 1 TABLET (75 MCG) BY MOUTH DAILY 90 tablet 3     Rhubarb (ESTROVERA) 4 MG TABS        albuterol (PROAIR HFA/PROVENTIL HFA/VENTOLIN HFA) 108 (90 Base) MCG/ACT inhaler Inhale 2 puffs into the lungs every 6 hours as needed for shortness of breath / dyspnea or wheezing (Patient not taking: Reported on 11/21/2019) 8.5 g 0     Allergies   Allergen Reactions     Penicillins      Recent Labs   Lab Test 11/21/19  1613 11/08/18  1542 09/07/17  1622 09/07/17  1622 10/15/15  0808 10/15/15  0808 10/09/13  0712 10/09/13  0712 06/06/13  1821   LDL  --  83  --   --   --  73  --  83  --    HDL  --  52  --   --   --  46*  --  41*  --    TRIG  --  130  --   --   --  79  --  92  --    ALT  --   --   --   --   --   --   --   --  24   CR 0.88  --   --  0.85  --  0.83   < >  --   --    GFRESTIMATED 74  --   --  70  --  72   < >  --   --    GFRESTBLACK 86  --   --  85  --  88   < >  --   --    POTASSIUM 3.7  --   --  4.3  --  4.1   < >  --   --    TSH 4.36* 3.51   < > 3.42   < > 2.69   < > 3.63  --     < > = values in this interval not displayed.        Mammogram Screening: Patient over age 50, mutual decision to screen reflected in health maintenance.    Pertinent mammograms are reviewed under the imaging tab.  History of abnormal Pap smear: NO - age 30-65 PAP every 5 years with negative HPV co-testing recommended  PAP / HPV Latest Ref Rng & Units 9/2/2016   PAP - NIL    HPV 16 DNA NEG Negative   HPV 18 DNA NEG Negative   OTHER HR HPV NEG Negative     Reviewed and updated as needed this visit by clinical staff                 Reviewed and updated as needed this visit by Provider                Past Medical History:   Diagnosis Date     Family history of colon cancer     due 2013 for      Hypothyroid      Sciatic nerve disease       Past Surgical History:   Procedure Laterality Date     APPENDECTOMY  4/11/2011    Dr Lona Duncan     COLONOSCOPY  9-29-08     COLONOSCOPY N/A 8/9/2018    Procedure: COMBINED COLONOSCOPY, SINGLE OR MULTIPLE BIOPSY/POLYPECTOMY BY BIOPSY;;  Surgeon: Lc Cervantes MD;  Location: [facility]     COLONOSCOPY WITH CO2 INSUFFLATION N/A 8/9/2018    Procedure: COLONOSCOPY WITH CO2 INSUFFLATION;  COLON SCREEN/ PATTIE;  Surgeon: Lc Cervantes MD;  Location: [facility]     ENDOSCOPY UPPER, COLONOSCOPY, COMBINED  7/1/2013    Procedure: COMBINED ENDOSCOPY UPPER, COLONOSCOPY;  COLONOSCOPY SCREEN-FAMILY HX OF COLON CANCER/ EGD-UPPER ENDOSCOPY-UPPER GI SYMPTOMS/ PATTIE;  Surgeon: Lc Cervantes MD;  Location: [facility]     ESOPHAGOSCOPY, GASTROSCOPY, DUODENOSCOPY (EGD), COMBINED  7/1/2013    Procedure: COMBINED ESOPHAGOSCOPY, GASTROSCOPY, DUODENOSCOPY (EGD), BIOPSY SINGLE OR MULTIPLE;;  Surgeon: Lc Cervantes MD;  Location: [facility]       Review of Systems   Constitutional: Negative for chills and fever.   HENT: Negative for congestion, ear pain and hearing loss.    Eyes: Negative for pain.   Respiratory: Negative for cough.    Cardiovascular: Negative for chest pain.   Gastrointestinal: Negative for abdominal pain, constipation, diarrhea, heartburn and hematochezia.   Genitourinary: Negative for frequency, genital sores and hematuria.   Neurological: Negative for dizziness and headaches.   Psychiatric/Behavioral: The patient is not nervous/anxious.            OBJECTIVE:   LMP 06/24/2012   Physical Exam  GENERAL: healthy, alert and no  distress  EYES: Eyes grossly normal to inspection, PERRL and conjunctivae and sclerae normal  HENT: ear canals and TM's normal, nose and mouth without ulcers or lesions  NECK: no adenopathy, no asymmetry, masses, or scars and thyroid normal to palpation  RESP: lungs clear to auscultation - no rales, rhonchi or wheezes  BREAST: normal without masses, tenderness or nipple discharge and no palpable axillary masses or adenopathy  CV: regular rate and rhythm, normal S1 S2, no S3 or S4, no murmur, click or rub, no peripheral edema and peripheral pulses strong  ABDOMEN: soft, nontender, no hepatosplenomegaly, no masses and bowel sounds normal   (female): normal female external genitalia, normal urethral meatus, vaginal mucosa pink, moist, well rugated, and normal cervix/adnexa/uterus without masses or discharge   (female): bimanual only  MS: no gross musculoskeletal defects noted, no edema  SKIN: no suspicious lesions or rashes  NEURO: Normal strength and tone, mentation intact and speech normal  PSYCH: mentation appears normal, affect normal/bright  LYMPH: no cervical, supraclavicular, axillary,  adenopathy    Diagnostic Test Results:  No results found for this or any previous visit (from the past 24 hour(s)).  See orders, Pending.     ASSESSMENT/PLAN:   Ave was seen today for physical and health maintenance.    Diagnoses and all orders for this visit:    Encounter for general adult medical examination with abnormal findings    CARDIOVASCULAR SCREENING; LDL GOAL LESS THAN 130    Hypothyroidism due to acquired atrophy of thyroid  -     TSH with free T4 reflex    Family history of iron deficiency anemia    NSAID long-term use  -     Basic metabolic panel  -     CBC with platelets    Routine general medical examination at a health care facility    Acute midline low back pain with right-sided sciatica  -     [facility] REFERRAL; Future      Patient Instructions     Preventive Health  Recommendations  Female Ages 50 - 64    Yearly exam: See your health care provider every year in order to  o Review health changes.   o Discuss preventive care.    o Review your medicines if your doctor has prescribed any.      Get a Pap test every three years (unless you have an abnormal result and your provider advises testing more often).    If you get Pap tests with HPV test, you only need to test every 5 years, unless you have an abnormal result.     You do not need a Pap test if your uterus was removed (hysterectomy) and you have not had cancer.    You should be tested each year for STDs (sexually transmitted diseases) if you're at risk.     Have a mammogram every 1 to 2 years.    Have a colonoscopy at age 50, or have a yearly FIT test (stool test). These exams screen for colon cancer.      Have a cholesterol test every 5 years, or more often if advised.    Have a diabetes test (fasting glucose) every three years. If you are at risk for diabetes, you should have this test more often.     If you are at risk for osteoporosis (brittle bone disease), think about having a bone density scan (DEXA).    Shots: Get a flu shot each year. Get a tetanus shot every 10 years.    Nutrition:     Eat at least 5 servings of fruits and vegetables each day.    Eat whole-grain bread, whole-wheat pasta and brown rice instead of white grains and rice.    Get adequate Calcium and Vitamin D.     Lifestyle    Exercise at least 150 minutes a week (30 minutes a day, 5 days a week). This will help you control your weight and prevent disease.    Limit alcohol to one drink per day.    No smoking.     Wear sunscreen to prevent skin cancer.     See your dentist every six months for an exam and cleaning.    See your eye doctor every 1 to 2 years.        Physical therapy will call you.  She has a longstanding L5/S1 moderate foraminal stenosis affecting sensation and may strength R>L, very mild.   See if therapy can help with home  "exercises/traction    If fails and/or symptoms progress consider neurology consult in future.           Patient has been advised of split billing requirements and indicates understanding: Yes  COUNSELING:  Reviewed preventive health counseling, as reflected in patient instructions       Regular exercise    Estimated body mass index is 36.36 kg/m  as calculated from the following:    Height as of 11/21/19: 1.588 m (5' 2.5\").    Weight as of 11/21/19: 91.6 kg (202 lb).    Weight management plan: has kept weight stable.     She reports that she has never smoked. She has never used smokeless tobacco.      Counseling Resources:  ATP IV Guidelines  Pooled Cohorts Equation Calculator  Breast Cancer Risk Calculator  BRCA-Related Cancer Risk Assessment: FHS-7 Tool  FRAX Risk Assessment  ICSI Preventive Guidelines  Dietary Guidelines for Americans, 2010  USDA's MyPlate  ASA Prophylaxis  Lung CA Screening    Tayla Espinal MD  [facility]  "

## 2020-10-19 NOTE — RESULT ENCOUNTER NOTE
Ave Willoughby    Thyroid, electrolytes, kidney function, Blood counts are all within normal limits.  No changes needed.     Have a great year!    Sincerely,     MALKA BLANKENSHIP M.D.

## 2020-10-23 ENCOUNTER — THERAPY VISIT (OUTPATIENT)
Dept: PHYSICAL THERAPY | Facility: CLINIC | Age: 55
End: 2020-10-23
Attending: INTERNAL MEDICINE
Payer: COMMERCIAL

## 2020-10-23 DIAGNOSIS — M54.41 CHRONIC BILATERAL LOW BACK PAIN WITH RIGHT-SIDED SCIATICA: ICD-10-CM

## 2020-10-23 DIAGNOSIS — M54.41 ACUTE MIDLINE LOW BACK PAIN WITH RIGHT-SIDED SCIATICA: ICD-10-CM

## 2020-10-23 DIAGNOSIS — G89.29 CHRONIC BILATERAL LOW BACK PAIN WITH RIGHT-SIDED SCIATICA: ICD-10-CM

## 2020-10-23 PROCEDURE — 97110 THERAPEUTIC EXERCISES: CPT | Mod: GP | Performed by: PHYSICAL THERAPIST

## 2020-10-23 PROCEDURE — 97161 PT EVAL LOW COMPLEX 20 MIN: CPT | Mod: GP | Performed by: PHYSICAL THERAPIST

## 2020-10-23 PROCEDURE — 97140 MANUAL THERAPY 1/> REGIONS: CPT | Mod: GP | Performed by: PHYSICAL THERAPIST

## 2020-10-23 NOTE — PROGRESS NOTES
[facility] Initial Evaluation  Subjective:    Patient Health History  Ave Willoughby being seen for Sciatica pain--right side extending to lower leg, some burning, tingling and numbness.          Pain is reported as 6/10 on pain scale.  General health as reported by patient is excellent.  Pertinent medical history includes: thyroid problems.     Medical allergies: other. Other medical allergies details: Penicillin.   Surgeries include:  Other. Other surgery history details: Appendectomy.    Current medications:  Thyroid medication.    Current occupation is Teacher.   Primary job tasks include:  Computer work, prolonged sitting and prolonged standing.                  Therapist Generated HPI Evaluation  Problem details: Pain for years overall.  Sx have recently gone further down in leg than usual.  MD referral 10/16/2020..         Type of problem:  Lumbar (bilat LB R> L).    This is a chronic condition.  Condition occurred with:  Insidious onset and degenerative joint disease.  Where condition occurred: for unknown reasons.  Patient reports pain:  Central lumbar spine, lumbar spine left, lumbar spine right and lower lumbar spine.  Pain is described as aching and burning and is constant.  Pain radiates to:  Knee right, foot right, gluteals right, thigh right and lower leg right (into toes x 2 -3 months). Pain is the same all the time (pain wakes her up now).  Since onset symptoms are unchanged.  Associated symptoms:  Loss of motion/stiffness, numbness and tingling. Symptoms are exacerbated by lifting, standing and sitting  and relieved by heat and activity/movement.  Special tests included:  MRI (2014 - stenosis).    Work activity restrictions: currently on leave to care for mother.  return to teaching soon.  Barriers include:  None as reported by patient.                        Objective:  Standing Alignment:        Lumbar:  Normal                           Lumbar/SI Evaluation  ROM:    AROM  Lumbar:   Flexion:          Normal without pain  Ext:                    Decreased 25% with increased pain   Side Bend:        Left:  Normal     Right:  Normal with pain  Rotation:           Left:     Right:   Side Glide:        Left:     Right:         Strength: fair trunk strength  Lumbar Myotomes:  normal            Lumbar DTR's:  normal        Lumbar Dermtomes:  normal                Neural Tension/Mobility:      Left side:SLR or Slump  negative.     Right side:   Slump or SLR  negative.   Lumbar Palpation:  normal          Spinal Segmental Conclusions: Pain with spring testing L1-L5            SI joint/Sacrum:    negative                                                       General     ROS    Assessment/Plan:    Patient is a 55 year old female with lumbar complaints.    Patient has the following significant findings with corresponding treatment plan.                Diagnosis 1:  R sciatica  Pain -  mechanical traction, manual therapy, self management, education, directional preference exercise and home program  Decreased ROM/flexibility - manual therapy and therapeutic exercise  Decreased strength - therapeutic exercise and therapeutic activities  Impaired muscle performance - neuro re-education  Decreased function - therapeutic activities    Therapy Evaluation Codes:   1) History comprised of:   Personal factors that impact the plan of care:      None.    Comorbidity factors that impact the plan of care are:      None.     Medications impacting care: None.  2) Examination of Body Systems comprised of:   Body structures and functions that impact the plan of care:      Lumbar spine.   Activity limitations that impact the plan of care are:      Lifting, Sitting and Standing.  3) Clinical presentation characteristics are:   Stable/Uncomplicated.  4) Decision-Making    Low complexity using standardized patient assessment instrument and/or measureable assessment of functional outcome.  Cumulative Therapy Evaluation  is: Low complexity.    Previous and current functional limitations:  (See Goal Flow Sheet for this information)    Short term and Long term goals: (See Goal Flow Sheet for this information)     Communication ability:  Patient appears to be able to clearly communicate and understand verbal and written communication and follow directions correctly.  Treatment Explanation - The following has been discussed with the patient:   RX ordered/plan of care  Anticipated outcomes  Possible risks and side effects  This patient would benefit from PT intervention to resume normal activities.   Rehab potential is good.    Frequency:  1 X week, once daily  Duration:  for 8 weeks  Discharge Plan:  Achieve all LTG.  Independent in home treatment program.  Reach maximal therapeutic benefit.    Please refer to the daily flowsheet for treatment today, total treatment time and time spent performing 1:1 timed codes.

## 2020-10-24 PROBLEM — G89.29 CHRONIC BILATERAL LOW BACK PAIN WITH RIGHT-SIDED SCIATICA: Status: ACTIVE | Noted: 2020-10-24

## 2020-10-24 PROBLEM — M54.41 CHRONIC BILATERAL LOW BACK PAIN WITH RIGHT-SIDED SCIATICA: Status: ACTIVE | Noted: 2020-10-24

## 2020-10-30 ENCOUNTER — THERAPY VISIT (OUTPATIENT)
Dept: PHYSICAL THERAPY | Facility: CLINIC | Age: 55
End: 2020-10-30
Payer: COMMERCIAL

## 2020-10-30 DIAGNOSIS — M54.41 CHRONIC BILATERAL LOW BACK PAIN WITH RIGHT-SIDED SCIATICA: ICD-10-CM

## 2020-10-30 DIAGNOSIS — G89.29 CHRONIC BILATERAL LOW BACK PAIN WITH RIGHT-SIDED SCIATICA: ICD-10-CM

## 2020-10-30 PROCEDURE — 97140 MANUAL THERAPY 1/> REGIONS: CPT | Mod: GP | Performed by: PHYSICAL THERAPIST

## 2020-10-30 PROCEDURE — 97110 THERAPEUTIC EXERCISES: CPT | Mod: GP | Performed by: PHYSICAL THERAPIST

## 2020-11-06 ENCOUNTER — THERAPY VISIT (OUTPATIENT)
Dept: PHYSICAL THERAPY | Facility: CLINIC | Age: 55
End: 2020-11-06
Payer: COMMERCIAL

## 2020-11-06 DIAGNOSIS — G89.29 CHRONIC BILATERAL LOW BACK PAIN WITH RIGHT-SIDED SCIATICA: ICD-10-CM

## 2020-11-06 DIAGNOSIS — M54.41 CHRONIC BILATERAL LOW BACK PAIN WITH RIGHT-SIDED SCIATICA: ICD-10-CM

## 2020-11-06 PROCEDURE — 97110 THERAPEUTIC EXERCISES: CPT | Mod: GP | Performed by: PHYSICAL THERAPIST

## 2020-11-06 PROCEDURE — 97140 MANUAL THERAPY 1/> REGIONS: CPT | Mod: GP | Performed by: PHYSICAL THERAPIST

## 2020-11-20 ENCOUNTER — THERAPY VISIT (OUTPATIENT)
Dept: PHYSICAL THERAPY | Facility: CLINIC | Age: 55
End: 2020-11-20
Payer: COMMERCIAL

## 2020-11-20 DIAGNOSIS — M54.41 CHRONIC BILATERAL LOW BACK PAIN WITH RIGHT-SIDED SCIATICA: ICD-10-CM

## 2020-11-20 DIAGNOSIS — G89.29 CHRONIC BILATERAL LOW BACK PAIN WITH RIGHT-SIDED SCIATICA: ICD-10-CM

## 2020-11-20 PROCEDURE — 97140 MANUAL THERAPY 1/> REGIONS: CPT | Mod: GP | Performed by: PHYSICAL THERAPIST

## 2020-11-20 PROCEDURE — 97110 THERAPEUTIC EXERCISES: CPT | Mod: GP | Performed by: PHYSICAL THERAPIST

## 2020-12-04 ENCOUNTER — THERAPY VISIT (OUTPATIENT)
Dept: PHYSICAL THERAPY | Facility: CLINIC | Age: 55
End: 2020-12-04
Payer: COMMERCIAL

## 2020-12-04 DIAGNOSIS — G89.29 CHRONIC BILATERAL LOW BACK PAIN WITH RIGHT-SIDED SCIATICA: ICD-10-CM

## 2020-12-04 DIAGNOSIS — M54.41 CHRONIC BILATERAL LOW BACK PAIN WITH RIGHT-SIDED SCIATICA: ICD-10-CM

## 2020-12-04 PROCEDURE — 97110 THERAPEUTIC EXERCISES: CPT | Mod: GP | Performed by: PHYSICAL THERAPIST

## 2020-12-04 PROCEDURE — 97140 MANUAL THERAPY 1/> REGIONS: CPT | Mod: GP | Performed by: PHYSICAL THERAPIST

## 2020-12-18 ENCOUNTER — THERAPY VISIT (OUTPATIENT)
Dept: PHYSICAL THERAPY | Facility: CLINIC | Age: 55
End: 2020-12-18
Payer: COMMERCIAL

## 2020-12-18 DIAGNOSIS — M54.41 CHRONIC BILATERAL LOW BACK PAIN WITH RIGHT-SIDED SCIATICA: ICD-10-CM

## 2020-12-18 DIAGNOSIS — G89.29 CHRONIC BILATERAL LOW BACK PAIN WITH RIGHT-SIDED SCIATICA: ICD-10-CM

## 2020-12-18 PROCEDURE — 97110 THERAPEUTIC EXERCISES: CPT | Mod: GP | Performed by: PHYSICAL THERAPIST

## 2020-12-18 PROCEDURE — 97140 MANUAL THERAPY 1/> REGIONS: CPT | Mod: GP | Performed by: PHYSICAL THERAPIST

## 2020-12-18 PROCEDURE — 97112 NEUROMUSCULAR REEDUCATION: CPT | Mod: GP | Performed by: PHYSICAL THERAPIST

## 2021-02-02 PROBLEM — M54.41 CHRONIC BILATERAL LOW BACK PAIN WITH RIGHT-SIDED SCIATICA: Status: RESOLVED | Noted: 2020-10-24 | Resolved: 2021-02-02

## 2021-02-02 PROBLEM — G89.29 CHRONIC BILATERAL LOW BACK PAIN WITH RIGHT-SIDED SCIATICA: Status: RESOLVED | Noted: 2020-10-24 | Resolved: 2021-02-02

## 2021-02-02 NOTE — PROGRESS NOTES
Subjective:  HPI  Physical Exam  Oswestry Score: 6 %                 Objective:  System    Physical Exam    General     ROS    Assessment/Plan:    DISCHARGE REPORT    Progress reporting period is from 10/23/2020 to 12/18/2020.     SUBJECTIVE  Subjective: Feeling great lately.  Some pain in R leg continues.     Current Pain level: 1/10   Initial Pain level: 6/10   Changes in function: Yes, see goal flow sheet for change in function   Adverse reactions: None;   ,     The subjective and objective information are from the last SOAP note on this patient.    OBJECTIVE  Objective: normal motion.  Minimal pain reports      ASSESSMENT/PLAN  Updated problem list and treatment plan: Diagnosis 1:  R sciatica  Pain -  mechanical traction, manual therapy, self management, education, directional preference exercise and home program  Decreased ROM/flexibility - manual therapy and therapeutic exercise  Decreased strength - therapeutic exercise and therapeutic activities  Impaired muscle performance - neuro re-education  Decreased function - therapeutic activities  STG/LTGs have been met or progress has been made towards goals:  Yes (See Goal flow sheet completed today.)  Assessment of Progress: The patient has not returned to therapy. Current status is unknown.  Patient is meeting short term goals and is progressing towards long term goals.  Self Management Plans:  Patient has been instructed in a home treatment program.    Ave continues to require the following intervention to meet STG and LTG's: PT intervention is no longer required to meet STG/LTG.  We will discharge this patient from PT.    Recommendations:  Pt did not call to schedule additional visits.  Discharge pt from PT.      Please refer to the daily flowsheet for treatment today, total treatment time and time spent performing 1:1 timed codes.

## 2021-03-29 DIAGNOSIS — E03.4 HYPOTHYROIDISM DUE TO ACQUIRED ATROPHY OF THYROID: ICD-10-CM

## 2021-03-30 RX ORDER — LEVOTHYROXINE SODIUM 75 UG/1
75 TABLET ORAL DAILY
Qty: 90 TABLET | Refills: 3 | Status: SHIPPED | OUTPATIENT
Start: 2021-03-30 | End: 2022-04-06

## 2021-06-20 ENCOUNTER — E-VISIT (OUTPATIENT)
Dept: FAMILY MEDICINE | Facility: CLINIC | Age: 56
End: 2021-06-20
Payer: COMMERCIAL

## 2021-06-20 DIAGNOSIS — Z20.822 SUSPECTED COVID-19 VIRUS INFECTION: Primary | ICD-10-CM

## 2021-06-20 PROCEDURE — 99421 OL DIG E/M SVC 5-10 MIN: CPT | Performed by: INTERNAL MEDICINE

## 2021-06-21 RX ORDER — OMEGA-3 FATTY ACIDS/FISH OIL 300-1000MG
400 CAPSULE ORAL EVERY 4 HOURS PRN
Qty: 120 CAPSULE | Refills: 1 | Status: SHIPPED | OUTPATIENT
Start: 2021-06-21 | End: 2022-12-16

## 2021-06-21 RX ORDER — ACETAMINOPHEN 500 MG
500-1000 TABLET ORAL EVERY 6 HOURS PRN
Qty: 60 TABLET | Refills: 1 | Status: SHIPPED | OUTPATIENT
Start: 2021-06-21 | End: 2021-11-22

## 2021-06-21 NOTE — PATIENT INSTRUCTIONS
Dear Ave Willoughby,    Your symptoms show that you may have coronavirus (COVID-19). This illness can cause fever, cough and trouble breathing. Many people get a mild case and get better on their own. Some people can get very sick.    Will I be tested for COVID-19?  We would like to test you for Covid-19 virus. I have placed orders for this test.     To schedule: go to your [employer] home page and scroll down to the section that says  You have an appointment that needs to be scheduled  and click the large green button that says  Schedule Now  and follow the steps to find the next available openings.    If you are unable to complete these [employer] scheduling steps, please call [phone] to schedule your testing.     Return to work/school/ guidance:  Please let your workplace manager and staffing office know when your quarantine ends     We can t give you an exact date as it depends on the above. You can calculate this on your own or work with your manager/staffing office to calculate this. (For example if you were exposed on 10/4, you would have to quarantine for 14 full days. That would be through 10/18. You could return on 10/19.)      If you receive a positive COVID-19 test result, follow the guidance of the those who are giving you the results. Usually the return to work is 10 (or in some cases 20 days from symptom onset.) If you work at [employer], you must also be cleared by Employee Occupational Health and Safety to return to work.        If you receive a negative COVID-19 test result and did not have a high risk exposure to someone with a known positive COVID-19 test, you can return to work once you're free of fever for 24 hours without fever-reducing medication and your symptoms are improving or resolved.      If you receive a negative COVID-19 test and If you had a high risk exposure to someone who has tested positive for COVID-19 then you can return to work 14 days after your last contact  with the positive individual    Note: If you have ongoing exposure to the covid positive person, this quarantine period may be more than 14 days. (For example, if you are continued to be exposed to your child who tested positive and cannot isolate from them, then the quarantine of 7-14 days can't start until your child is no longer contagious. This is typically 10 days from onset of the child's symptoms. So the total duration may be 17-24 days in this case.)    Sign up for [employer]   We know it's scary to hear that you might have COVID-19. We want to track your symptoms to make sure you're okay over the next 2 weeks. Please look for an email from [employer]--this is a free, online program that we'll use to keep in touch. To sign up, follow the link in the email you will receive. Learn more at [email or web address]    How can I take care of myself?    Get lots of rest. Drink extra fluids (unless a doctor has told you not to)    Take Tylenol (acetaminophen) or ibuprofen for fever or pain. If you have liver or kidney problems, ask your family doctor if it's okay to take Tylenol o ibuprofen    If you have other health problems (like cancer, heart failure, an organ transplant or severe kidney disease): Call your specialty clinic if you don't feel better in the next 2 days.    Know when to call 911. Emergency warning signs include:  o Trouble breathing or shortness of breath  o Pain or pressure in the chest that doesn't go away  o Feeling confused like you haven't felt before, or not being able to wake up  o Bluish-colored lips or face    Where can I get more information?  [facility] [place] - About COVID-19:   [email or web address]    CDC - What to Do If You're Sick:   www.cdc.gov/coronavirus/2019-ncov/about/steps-when-sick.html

## 2021-07-17 ENCOUNTER — MYC MEDICAL ADVICE (OUTPATIENT)
Dept: INTERNAL MEDICINE | Facility: CLINIC | Age: 56
End: 2021-07-17

## 2021-07-17 ENCOUNTER — ANCILLARY PROCEDURE (OUTPATIENT)
Dept: GENERAL RADIOLOGY | Facility: CLINIC | Age: 56
End: 2021-07-17
Attending: FAMILY MEDICINE
Payer: COMMERCIAL

## 2021-07-17 ENCOUNTER — OFFICE VISIT (OUTPATIENT)
Dept: URGENT CARE | Facility: URGENT CARE | Age: 56
End: 2021-07-17
Payer: COMMERCIAL

## 2021-07-17 VITALS
BODY MASS INDEX: 36.25 KG/M2 | SYSTOLIC BLOOD PRESSURE: 133 MMHG | TEMPERATURE: 98.4 F | WEIGHT: 201.4 LBS | DIASTOLIC BLOOD PRESSURE: 76 MMHG | RESPIRATION RATE: 20 BRPM | HEART RATE: 73 BPM | OXYGEN SATURATION: 98 %

## 2021-07-17 DIAGNOSIS — R05.9 COUGH: ICD-10-CM

## 2021-07-17 DIAGNOSIS — J22 LOWER RESPIRATORY INFECTION: Primary | ICD-10-CM

## 2021-07-17 PROCEDURE — 71046 X-RAY EXAM CHEST 2 VIEWS: CPT | Performed by: RADIOLOGY

## 2021-07-17 PROCEDURE — U0003 INFECTIOUS AGENT DETECTION BY NUCLEIC ACID (DNA OR RNA); SEVERE ACUTE RESPIRATORY SYNDROME CORONAVIRUS 2 (SARS-COV-2) (CORONAVIRUS DISEASE [COVID-19]), AMPLIFIED PROBE TECHNIQUE, MAKING USE OF HIGH THROUGHPUT TECHNOLOGIES AS DESCRIBED BY CMS-2020-01-R: HCPCS | Performed by: FAMILY MEDICINE

## 2021-07-17 PROCEDURE — 99214 OFFICE O/P EST MOD 30 MIN: CPT | Performed by: FAMILY MEDICINE

## 2021-07-17 PROCEDURE — U0005 INFEC AGEN DETEC AMPLI PROBE: HCPCS | Performed by: FAMILY MEDICINE

## 2021-07-17 RX ORDER — AZITHROMYCIN 250 MG/1
TABLET, FILM COATED ORAL
Qty: 6 TABLET | Refills: 0 | Status: SHIPPED | OUTPATIENT
Start: 2021-07-17 | End: 2021-11-18

## 2021-07-17 RX ORDER — AZITHROMYCIN 250 MG/1
TABLET, FILM COATED ORAL
Qty: 6 TABLET | Refills: 0 | Status: SHIPPED | OUTPATIENT
Start: 2021-07-17 | End: 2021-07-17

## 2021-07-17 RX ORDER — BENZONATATE 100 MG/1
100 CAPSULE ORAL 3 TIMES DAILY PRN
Qty: 30 CAPSULE | Refills: 0 | Status: SHIPPED | OUTPATIENT
Start: 2021-07-17 | End: 2021-07-17

## 2021-07-17 RX ORDER — BENZONATATE 100 MG/1
100 CAPSULE ORAL 3 TIMES DAILY PRN
Qty: 30 CAPSULE | Refills: 0 | Status: SHIPPED | OUTPATIENT
Start: 2021-07-17 | End: 2021-11-22

## 2021-07-17 RX ORDER — ALBUTEROL SULFATE 90 UG/1
2 AEROSOL, METERED RESPIRATORY (INHALATION) EVERY 6 HOURS
Qty: 8 G | Refills: 1 | Status: SHIPPED | OUTPATIENT
Start: 2021-07-17 | End: 2021-11-22

## 2021-07-17 RX ORDER — ALBUTEROL SULFATE 90 UG/1
2 AEROSOL, METERED RESPIRATORY (INHALATION) EVERY 6 HOURS
Qty: 8 G | Refills: 1 | Status: SHIPPED | OUTPATIENT
Start: 2021-07-17 | End: 2021-07-17

## 2021-07-17 ASSESSMENT — PAIN SCALES - GENERAL: PAINLEVEL: NO PAIN (0)

## 2021-07-17 NOTE — PATIENT INSTRUCTIONS

## 2021-07-17 NOTE — PROGRESS NOTES
Assessment & Plan     Lower respiratory infection  Differentials discussed in detail.  Suspect symptoms secondary to lower respiratory tract infection, acute bronchitis, symptoms worsening.  Wells score 0.  COVID-19 test ordered for further evaluation.  X-ray findings reviewed independently, no acute infiltrate or significant other abnormality noted.  Azithromycin, Tessalon and albuterol inhaler prescribed, common side effects discussed.  Suggested to continue well hydration, warm fluids, over-the-counter analgesia as needed.  Return criteria discussed.  Patient understood and in agreement with above plan.  All questions answered.  - azithromycin (ZITHROMAX) 250 MG tablet; Two tablets first day, then one tablet daily for four days.  - benzonatate (TESSALON) 100 MG capsule; Take 1 capsule (100 mg) by mouth 3 times daily as needed for cough  - albuterol (PROAIR HFA/PROVENTIL HFA/VENTOLIN HFA) 108 (90 Base) MCG/ACT inhaler; Inhale 2 puffs into the lungs every 6 hours    Cough  - XR Chest 2 Views; Future  - Symptomatic COVID-19 Virus (Coronavirus) by PCR Nasopharyngeal; Future  - Symptomatic COVID-19 Virus (Coronavirus) by PCR Nasopharyngeal        Patient Instructions     Patient Education     Bronchitis, Antibiotic Treatment (Adult)    Bronchitis is an infection of the air passages (bronchial tubes) in your lungs. It often occurs when you have a cold. This illness is contagious during the first few days and is spread through the air by coughing and sneezing, or by direct contact (touching the sick person and then touching your own eyes, nose, or mouth).  Symptoms of bronchitis include cough with mucus (phlegm) and low-grade fever. Bronchitis usually lasts 7 to 14 days. Mild cases can be treated with simple home remedies. More severe infection is treated with an antibiotic.  Home care  Follow these guidelines when caring for yourself at home:    If your symptoms are severe, rest at home for the first 2 to 3 days.  When you go back to your usual activities, don't let yourself get too tired.    Don't smoke. Also stay away from secondhand smoke.    You may use over-the-counter medicines to control fever or pain, unless another medicine was prescribed. If you have chronic liver or kidney disease or have ever had a stomach ulcer or gastrointestinal bleeding, talk with your healthcare provider before using these medicines. Also talk to your provider if you are taking medicine to prevent blood clots. Aspirin should never be given to anyone younger than 18 who is ill with a viral infection or fever. It may cause severe liver or brain damage.    Your appetite may be low, so a light diet is fine. Stay well hydrated by drinking 6 to 8 glasses of fluids per day. This includes water, soft drinks, sports drinks, juices, tea, or soup. Extra fluids will help loosen mucus in your nose and lungs.    Over-the-counter cough, cold, and sore-throat medicines will not shorten the length of the illness, but they may be helpful to reduce your symptoms. Don't use decongestants if you have high blood pressure.    Finish all antibiotic medicine. Do this even if you are feeling better after only a few days.  Follow-up care  Follow up with your healthcare provider, or as advised. If you had an X-ray or ECG (electrocardiogram), a specialist will review it. You will be told of any new test results that may affect your care.  If you are age 65 or older, if you smoke, or if you have a chronic lung disease or condition that affects your immune system, ask your healthcare provider about getting a pneumococcal vaccine and a yearly flu shot (influenza vaccine).  When to seek medical advice  Call your healthcare provider right away if any of these occur:    Fever of 100.4 F (38 C) or higher, or as directed by your healthcare provider    Coughing up more sputum    Weakness, drowsiness, headache, facial pain, ear pain, or a stiff neck  Call 911  Call 911 if any of  these occur.    Coughing up blood    Weakness, drowsiness, headache, or stiff neck that get worse    Trouble breathing, wheezing, or pain with breathing  [employer] last reviewed this educational content on 6/1/2018 2000-2021 [employer]. All rights reserved. This information is not intended as a substitute for professional medical care. Always follow your healthcare professional's instructions.                 Matt Selby MD  [facility] [place]    Lisa White is a 55 year old who presents for the following health issues     HPI     Concern -   Onset: >1 week   Description: nonproductive cough, chest congestion and wheezing  Intensity: moderate  Progression of Symptoms:  worsening  Accompanying Signs & Symptoms: No fever, chills, shortness of breath, chest pain or other relevant systemic symptoms  Previous history of similar problem:  Therapies tried and outcome: mucinex, albuterol inhaler         Review of Systems    ROS: 10 point ROS neg other than the symptoms noted above in the HPI.      Objective    /76   Pulse 73   Temp 98.4  F (36.9  C) (Tympanic)   Resp 20   Wt 91.4 kg (201 lb 6.4 oz)   LMP 06/24/2012   SpO2 98%   BMI 36.25 kg/m    Body mass index is 36.25 kg/m .  Physical Exam   GENERAL: alert and no distress  EYES: Eyes grossly normal to inspection, PERRL and conjunctivae and sclerae normal  HENT: normal cephalic/atraumatic, ear canals and TM's normal, nose and mouth without ulcers or lesions, oropharynx clear and oral mucous membranes moist  NECK: no adenopathy, no asymmetry, masses, or scars and thyroid normal to palpation  RESP: Occasional wheeze bibasilarly, no rhonchi or rales auscultated  CV: regular rates and rhythm, normal S1 S2, no S3 or S4 and no murmur, click or rub  MS: no gross musculoskeletal defects noted, no edema  SKIN: no suspicious lesions or rashes  NEURO: Normal strength and tone, mentation intact and speech normal  PSYCH:  mentation appears normal, affect normal/bright      Results for orders placed or performed in visit on 07/17/21   Symptomatic COVID-19 Virus (Coronavirus) by PCR Nasopharyngeal     Status: None (In process)    Specimen: Nasopharyngeal; Swab    Narrative    The following orders were created for panel order Symptomatic COVID-19 Virus (Coronavirus) by PCR Nasopharyngeal.  Procedure                               Abnormality         Status                     ---------                               -----------         ------                     SARS-COV2 (COVID-19) Vir...[[id number]]                      In process                   Please view results for these tests on the individual orders.   Results for orders placed or performed in visit on 07/17/21   XR Chest 2 Views     Status: None    Narrative    XR CHEST TWO VIEWS   7/17/2021 2:17 PM     HISTORY: Cough    COMPARISON: None.      Impression    IMPRESSION: PA and lateral views of the chest. Lungs are clear.  Probable prominent right pericardial fat pad medial right lung base.  Heart is normal in size. No effusions are evident. No pneumothorax.    KENYON DICKENS MD         SYSTEM ID:  [id number]

## 2021-07-18 LAB — SARS-COV-2 RNA RESP QL NAA+PROBE: NEGATIVE

## 2021-07-19 NOTE — TELEPHONE ENCOUNTER
I can add her on as last patient, at 2 pm.   Plan to room her immediately upon arrival, will use PPE for visit.  Thank you

## 2021-07-19 NOTE — TELEPHONE ENCOUNTER
Spoke with patient and she went to urgent care on Saturday and got on an antibiotic and is feeling better.

## 2021-09-11 ENCOUNTER — HEALTH MAINTENANCE LETTER (OUTPATIENT)
Age: 56
End: 2021-09-11

## 2021-11-04 ENCOUNTER — ANCILLARY PROCEDURE (OUTPATIENT)
Dept: MAMMOGRAPHY | Facility: CLINIC | Age: 56
End: 2021-11-04
Payer: COMMERCIAL

## 2021-11-04 DIAGNOSIS — Z12.31 VISIT FOR SCREENING MAMMOGRAM: ICD-10-CM

## 2021-11-04 PROCEDURE — 77067 SCR MAMMO BI INCL CAD: CPT | Mod: TC | Performed by: RADIOLOGY

## 2021-11-16 ASSESSMENT — ENCOUNTER SYMPTOMS
BREAST MASS: 0
CONSTIPATION: 0
PARESTHESIAS: 0
EYE PAIN: 0
NAUSEA: 0
NERVOUS/ANXIOUS: 0
CHILLS: 0
ARTHRALGIAS: 1
HEMATOCHEZIA: 0
HEMATURIA: 0
HEADACHES: 0
COUGH: 0
FEVER: 0
SHORTNESS OF BREATH: 0
DIARRHEA: 0
ABDOMINAL PAIN: 0
JOINT SWELLING: 0
WEAKNESS: 0
MYALGIAS: 0
FREQUENCY: 0
HEARTBURN: 0
DIZZINESS: 0
SORE THROAT: 0
DYSURIA: 0
PALPITATIONS: 0

## 2021-11-22 ENCOUNTER — OFFICE VISIT (OUTPATIENT)
Dept: INTERNAL MEDICINE | Facility: CLINIC | Age: 56
End: 2021-11-22
Payer: COMMERCIAL

## 2021-11-22 ENCOUNTER — PATIENT OUTREACH (OUTPATIENT)
Dept: ONCOLOGY | Facility: CLINIC | Age: 56
End: 2021-11-22

## 2021-11-22 VITALS
HEART RATE: 64 BPM | SYSTOLIC BLOOD PRESSURE: 154 MMHG | OXYGEN SATURATION: 99 % | WEIGHT: 200 LBS | DIASTOLIC BLOOD PRESSURE: 88 MMHG | HEIGHT: 63 IN | TEMPERATURE: 97.7 F | BODY MASS INDEX: 35.44 KG/M2

## 2021-11-22 DIAGNOSIS — Z80.3 FAMILY HISTORY OF MALIGNANT NEOPLASM OF BREAST: ICD-10-CM

## 2021-11-22 DIAGNOSIS — Z00.00 ROUTINE GENERAL MEDICAL EXAMINATION AT A HEALTH CARE FACILITY: ICD-10-CM

## 2021-11-22 DIAGNOSIS — Z00.01 ENCOUNTER FOR GENERAL ADULT MEDICAL EXAMINATION WITH ABNORMAL FINDINGS: Primary | ICD-10-CM

## 2021-11-22 DIAGNOSIS — Z12.4 SCREENING FOR MALIGNANT NEOPLASM OF CERVIX: ICD-10-CM

## 2021-11-22 DIAGNOSIS — Z80.0 FAMILY HISTORY OF COLON CANCER: ICD-10-CM

## 2021-11-22 DIAGNOSIS — E03.4 HYPOTHYROIDISM DUE TO ACQUIRED ATROPHY OF THYROID: ICD-10-CM

## 2021-11-22 DIAGNOSIS — Z79.1 NSAID LONG-TERM USE: ICD-10-CM

## 2021-11-22 DIAGNOSIS — D50.0 IRON DEFICIENCY ANEMIA DUE TO CHRONIC BLOOD LOSS: ICD-10-CM

## 2021-11-22 LAB
ANION GAP SERPL CALCULATED.3IONS-SCNC: 4 MMOL/L (ref 3–14)
BUN SERPL-MCNC: 12 MG/DL (ref 7–30)
CALCIUM SERPL-MCNC: 9.2 MG/DL (ref 8.5–10.1)
CHLORIDE BLD-SCNC: 110 MMOL/L (ref 94–109)
CO2 SERPL-SCNC: 27 MMOL/L (ref 20–32)
CREAT SERPL-MCNC: 0.79 MG/DL (ref 0.52–1.04)
ERYTHROCYTE [DISTWIDTH] IN BLOOD BY AUTOMATED COUNT: 16.5 % (ref 10–15)
FERRITIN SERPL-MCNC: 8 NG/ML (ref 8–252)
GFR SERPL CREATININE-BSD FRML MDRD: 83 ML/MIN/1.73M2
GLUCOSE BLD-MCNC: 94 MG/DL (ref 70–99)
HCT VFR BLD AUTO: 38.9 % (ref 35–47)
HGB BLD-MCNC: 12.2 G/DL (ref 11.7–15.7)
MCH RBC QN AUTO: 26.1 PG (ref 26.5–33)
MCHC RBC AUTO-ENTMCNC: 31.4 G/DL (ref 31.5–36.5)
MCV RBC AUTO: 83 FL (ref 78–100)
PLATELET # BLD AUTO: 350 10E3/UL (ref 150–450)
POTASSIUM BLD-SCNC: 4.1 MMOL/L (ref 3.4–5.3)
RBC # BLD AUTO: 4.67 10E6/UL (ref 3.8–5.2)
SODIUM SERPL-SCNC: 141 MMOL/L (ref 133–144)
T4 FREE SERPL-MCNC: 1.04 NG/DL (ref 0.76–1.46)
TSH SERPL DL<=0.005 MIU/L-ACNC: 4.23 MU/L (ref 0.4–4)
WBC # BLD AUTO: 4.5 10E3/UL (ref 4–11)

## 2021-11-22 PROCEDURE — 80048 BASIC METABOLIC PNL TOTAL CA: CPT | Performed by: INTERNAL MEDICINE

## 2021-11-22 PROCEDURE — 84443 ASSAY THYROID STIM HORMONE: CPT | Performed by: INTERNAL MEDICINE

## 2021-11-22 PROCEDURE — 87624 HPV HI-RISK TYP POOLED RSLT: CPT | Performed by: INTERNAL MEDICINE

## 2021-11-22 PROCEDURE — 36415 COLL VENOUS BLD VENIPUNCTURE: CPT | Performed by: INTERNAL MEDICINE

## 2021-11-22 PROCEDURE — 99396 PREV VISIT EST AGE 40-64: CPT | Performed by: INTERNAL MEDICINE

## 2021-11-22 PROCEDURE — 82728 ASSAY OF FERRITIN: CPT | Performed by: INTERNAL MEDICINE

## 2021-11-22 PROCEDURE — G0145 SCR C/V CYTO,THINLAYER,RESCR: HCPCS | Performed by: INTERNAL MEDICINE

## 2021-11-22 PROCEDURE — 84439 ASSAY OF FREE THYROXINE: CPT | Performed by: INTERNAL MEDICINE

## 2021-11-22 PROCEDURE — 85027 COMPLETE CBC AUTOMATED: CPT | Performed by: INTERNAL MEDICINE

## 2021-11-22 ASSESSMENT — ENCOUNTER SYMPTOMS
HEARTBURN: 0
JOINT SWELLING: 0
CHILLS: 0
SORE THROAT: 0
SHORTNESS OF BREATH: 0
COUGH: 0
NAUSEA: 0
CONSTIPATION: 0
PARESTHESIAS: 0
DYSURIA: 0
BREAST MASS: 0
MYALGIAS: 0
ARTHRALGIAS: 1
PALPITATIONS: 0
NERVOUS/ANXIOUS: 0
ABDOMINAL PAIN: 0
HEMATOCHEZIA: 0
DIZZINESS: 0
EYE PAIN: 0
FREQUENCY: 0
FEVER: 0
HEADACHES: 0
HEMATURIA: 0
WEAKNESS: 0
DIARRHEA: 0

## 2021-11-22 ASSESSMENT — MIFFLIN-ST. JEOR: SCORE: 1461.32

## 2021-11-22 NOTE — PROGRESS NOTES
SUBJECTIVE:   CC: Ave Willoughby is an 56 year old woman who presents for preventive health visit.     55 y/o F here for AFE.  h/o  Lumbar SS, hypothyroid and h/o TAMIKA.  Has some djd in lower body, has had  injections  via [facility] .  as had MRIs in past showing bilateral L5/S1 moderate foramen stenosis - has R leg numbness in L5/S1 pattern.    Caregiver w/sister for mother w/dementia, coping wall with this.  Mother still in [facility].         Patient has been advised of split billing requirements and indicates understanding: Yes  Healthy Habits:     Getting at least 3 servings of Calcium per day:  Yes    Bi-annual eye exam:  Yes    Dental care twice a year:  Yes    Sleep apnea or symptoms of sleep apnea:  None    Diet:  Other    Frequency of exercise:  4-5 days/week    Duration of exercise:  45-60 minutes    Taking medications regularly:  Yes    Medication side effects:  None    PHQ-2 Total Score: 0    Additional concerns today:  Yes          No Concerns     Today's PHQ-2 Score:   PHQ-2 ( 1999 Pfizer) 11/16/2021   Q1: Little interest or pleasure in doing things 0   Q2: Feeling down, depressed or hopeless 0   PHQ-2 Score 0   PHQ-2 Total Score (12-17 Years)- Positive if 3 or more points; Administer PHQ-A if positive -   Q1: Little interest or pleasure in doing things Not at all   Q2: Feeling down, depressed or hopeless Not at all   PHQ-2 Score 0       Abuse: Current or Past (Physical, Sexual or Emotional) - No  Do you feel safe in your environment? Yes        Social History     Tobacco Use     Smoking status: Never Smoker     Smokeless tobacco: Never Used   Substance Use Topics     Alcohol use: Yes     Comment: 5 drinks a week     If you drink alcohol do you typically have >3 drinks per day or >7 drinks per week? No    Alcohol Use 11/16/2021   Prescreen: >3 drinks/day or >7 drinks/week? No   Prescreen: >3 drinks/day or >7 drinks/week? -       Reviewed orders with patient.  Reviewed health maintenance and updated  orders accordingly - Yes  Lab work is in process  Labs reviewed in EPIC  BP Readings from Last 3 Encounters:   11/22/21 (!) 154/88   07/17/21 133/76   10/16/20 130/83    Wt Readings from Last 3 Encounters:   11/22/21 90.7 kg (200 lb)   07/17/21 91.4 kg (201 lb 6.4 oz)   10/16/20 91.2 kg (201 lb)                  Patient Active Problem List   Diagnosis     CARDIOVASCULAR SCREENING; LDL GOAL LESS THAN 130     Lumbar pain     Spondylolisthesis of lumbar region     Lumbar foraminal stenosis     Sacroiliac pain     Family history of colon cancer     Iron deficiency anemia due to chronic blood loss     Hypothyroidism due to acquired atrophy of thyroid     NSAID long-term use     Past Surgical History:   Procedure Laterality Date     APPENDECTOMY  4/11/2011    Dr Lona Duncan     COLONOSCOPY  9-29-08     COLONOSCOPY N/A 8/9/2018    Procedure: COMBINED COLONOSCOPY, SINGLE OR MULTIPLE BIOPSY/POLYPECTOMY BY BIOPSY;;  Surgeon: Lc Cervantes MD;  Location: [facility]     COLONOSCOPY WITH CO2 INSUFFLATION N/A 8/9/2018    Procedure: COLONOSCOPY WITH CO2 INSUFFLATION;  COLON SCREEN/ PATTIE;  Surgeon: Lc Cervantes MD;  Location: [facility]     ENDOSCOPY UPPER, COLONOSCOPY, COMBINED  7/1/2013    Procedure: COMBINED ENDOSCOPY UPPER, COLONOSCOPY;  COLONOSCOPY SCREEN-FAMILY HX OF COLON CANCER/ EGD-UPPER ENDOSCOPY-UPPER GI SYMPTOMS/ PATTIE;  Surgeon: Lc Cervantes MD;  Location: [facility]     ESOPHAGOSCOPY, GASTROSCOPY, DUODENOSCOPY (EGD), COMBINED  7/1/2013    Procedure: COMBINED ESOPHAGOSCOPY, GASTROSCOPY, DUODENOSCOPY (EGD), BIOPSY SINGLE OR MULTIPLE;;  Surgeon: Lc Cervantes MD;  Location: [facility]       Social History     Tobacco Use     Smoking status: Never Smoker     Smokeless tobacco: Never Used   Substance Use Topics     Alcohol use: Yes     Comment: 5 drinks a week     Family History   Problem Relation Age of Onset     Breast Cancer Mother      Thyroid Disease Mother      Cancer - colorectal  Father      Cancer Father         brain     Diabetes Maternal Grandmother      Cardiovascular Paternal Grandmother      Thyroid Disease Sister          Current Outpatient Medications   Medication Sig Dispense Refill     famotidine (PEPCID) 20 MG tablet Take 20 mg by mouth daily       ibuprofen (ADVIL/MOTRIN) 200 MG capsule Take 2 capsules (400 mg) by mouth every 4 hours as needed for fever 120 capsule 1     levothyroxine (SYNTHROID/LEVOTHROID) 75 MCG tablet TAKE 1 TABLET (75 MCG) BY MOUTH DAILY 90 tablet 3     Multiple Vitamin (MULTIVITAMIN ADULT PO)        Rhubarb (ESTROVERA) 4 MG TABS        acetaminophen (TYLENOL) 500 MG tablet Take 1-2 tablets (500-1,000 mg) by mouth every 6 hours as needed for mild pain (Patient not taking: Reported on 11/22/2021) 60 tablet 1     albuterol (PROAIR HFA/PROVENTIL HFA/VENTOLIN HFA) 108 (90 Base) MCG/ACT inhaler Inhale 2 puffs into the lungs every 6 hours (Patient not taking: Reported on 11/22/2021) 8 g 1     albuterol (PROAIR HFA/PROVENTIL HFA/VENTOLIN HFA) 108 (90 Base) MCG/ACT inhaler Inhale 2 puffs into the lungs every 6 hours as needed for shortness of breath / dyspnea or wheezing (Patient not taking: Reported on 11/22/2021) 8.5 g 0     benzonatate (TESSALON) 100 MG capsule Take 1 capsule (100 mg) by mouth 3 times daily as needed for cough 30 capsule 0     Allergies   Allergen Reactions     Pcn [Penicillins]      Recent Labs   Lab Test 10/16/20  1615 11/21/19  1613 11/08/18  1542 09/02/16  1006 10/15/15  0808 10/23/14  0711 10/09/13  0712   LDL  --   --  83  --  73  --  83   HDL  --   --  52  --  46*  --  41*   TRIG  --   --  130  --  79  --  92   CR 0.80 0.88  --    < > 0.83  --   --    GFRESTIMATED 82 74  --    < > 72  --   --    GFRESTBLACK >90 86  --    < > 88  --   --    POTASSIUM 3.9 3.7  --    < > 4.1  --   --    TSH 2.20 4.36* 3.51   < > 2.69   < > 3.63    < > = values in this interval not displayed.        Breast Cancer Screening:    FHS-7:   Breast CA Risk Assessment  (FHS-7) 11/4/2021 11/16/2021   Did any of your first-degree relatives have breast or ovarian cancer? Yes Yes   Did any of your relatives have bilateral breast cancer? No No   Did any man in your family have breast cancer? No No   Did any woman in your family have breast and ovarian cancer? No No   Did any woman in your family have breast cancer before age 50 y? No No   Do you have 2 or more relatives with breast and/or ovarian cancer? No No   Do you have 2 or more relatives with breast and/or bowel cancer? No Yes     click delete button to remove this line now  Mammogram Screening - Alternate mammogram schedule due to breast cancer history  Pertinent mammograms are reviewed under the imaging tab.  Yearly due to FmHx Br Ca     History of abnormal Pap smear:   Last 3 Pap Results:   PAP (no units)   Date Value   09/02/2016 NIL     PAP / HPV Latest Ref Rng & Units 9/2/2016   PAP (Historical) - NIL   HPV16 NEG Negative   HPV18 NEG Negative   HRHPV NEG Negative     Reviewed and updated as needed this visit by clinical staff                Reviewed and updated as needed this visit by Provider               Past Medical History:   Diagnosis Date     Family history of colon cancer     due 2013 for      Hypothyroid      Sciatic nerve disease       Past Surgical History:   Procedure Laterality Date     APPENDECTOMY  4/11/2011    Dr Lona Duncan     COLONOSCOPY  9-29-08     COLONOSCOPY N/A 8/9/2018    Procedure: COMBINED COLONOSCOPY, SINGLE OR MULTIPLE BIOPSY/POLYPECTOMY BY BIOPSY;;  Surgeon: Lc Cervantes MD;  Location: [facility]     COLONOSCOPY WITH CO2 INSUFFLATION N/A 8/9/2018    Procedure: COLONOSCOPY WITH CO2 INSUFFLATION;  COLON SCREEN/ PATTIE;  Surgeon: Lc Cervantes MD;  Location: [facility]     ENDOSCOPY UPPER, COLONOSCOPY, COMBINED  7/1/2013    Procedure: COMBINED ENDOSCOPY UPPER, COLONOSCOPY;  COLONOSCOPY SCREEN-FAMILY HX OF COLON CANCER/ EGD-UPPER ENDOSCOPY-UPPER GI SYMPTOMS/ PATTIE;  Surgeon:  "Lc Cervantes MD;  Location: [facility]     ESOPHAGOSCOPY, GASTROSCOPY, DUODENOSCOPY (EGD), COMBINED  7/1/2013    Procedure: COMBINED ESOPHAGOSCOPY, GASTROSCOPY, DUODENOSCOPY (EGD), BIOPSY SINGLE OR MULTIPLE;;  Surgeon: Lc Cervantes MD;  Location: [facility]       Review of Systems   Constitutional: Negative for chills and fever.   HENT: Negative for congestion, ear pain, hearing loss and sore throat.    Eyes: Negative for pain and visual disturbance.   Respiratory: Negative for cough and shortness of breath.    Cardiovascular: Negative for chest pain, palpitations and peripheral edema.   Gastrointestinal: Negative for abdominal pain, constipation, diarrhea, heartburn, hematochezia and nausea.   Breasts:  Negative for tenderness, breast mass and discharge.   Genitourinary: Negative for dysuria, frequency, genital sores, hematuria, pelvic pain, urgency, vaginal bleeding and vaginal discharge.   Musculoskeletal: Positive for arthralgias. Negative for joint swelling and myalgias.   Skin: Negative for rash.   Neurological: Negative for dizziness, weakness, headaches and paresthesias.   Psychiatric/Behavioral: Negative for mood changes. The patient is not nervous/anxious.            OBJECTIVE:   BP (!) 154/88 (BP Location: Right arm, Patient Position: Sitting, Cuff Size: Adult Large)   Pulse 64   Temp 97.7  F (36.5  C) (Oral)   Ht 1.6 m (5' 3\")   Wt 90.7 kg (200 lb)   LMP 06/24/2012   SpO2 99%   BMI 35.43 kg/m     Home sBP in 120's consistently      Physical Exam  GENERAL APPEARANCE: healthy, alert and no distress  EYES: Eyes grossly normal to inspection, PERRL and conjunctivae and sclerae normal  HENT: ear canals and TM's normal, nose and mouth without ulcers or lesions, oropharynx clear and oral mucous membranes moist  NECK: no adenopathy, no asymmetry, masses, or scars and thyroid normal to palpation  RESP: lungs clear to auscultation - no rales, rhonchi or wheezes  BREAST: normal without masses, " tenderness or nipple discharge and no palpable axillary masses or adenopathy  CV: regular rate and rhythm, normal S1 S2, no S3 or S4, no murmur, click or rub, no peripheral edema and peripheral pulses strong  ABDOMEN: soft, nontender, no hepatosplenomegaly, no masses and bowel sounds normal   (female): normal female external genitalia, normal urethral meatus, vaginal mucosal atrophy noted, normal cervix, adnexae, and uterus without masses or abnormal discharge   (female): PAP done with excellent view cervix.   MS: no musculoskeletal defects are noted and gait is age appropriate without ataxia  SKIN: no suspicious lesions or rashes  NEURO: Normal strength and tone, sensory exam grossly normal, mentation intact and speech normal  PSYCH: mentation appears normal and affect normal/bright    Diagnostic Test Results:  Labs reviewed in Epic  SEE ORDERS DONE TODAY  No results found for this or any previous visit (from the past 24 hour(s)).    ASSESSMENT/PLAN:   (Z00.01) Encounter for general adult medical examination with abnormal findings  (primary encounter diagnosis)   (Z00.00) Routine general medical examination at a health care facility      (Z79.1) NSAID long-term use  Comment:  Surveillance   Plan: CBC with platelets, Basic metabolic panel             (E03.4) Hypothyroidism due to acquired atrophy of thyroid  Comment: at goal   Plan: TSH with free T4 reflex             (Z80.0) Family history of colon cancer  Comment:    Plan:      (D50.0) Iron deficiency anemia due to chronic blood loss  Comment:   H/o  Surveillance.   Plan: CBC with platelets, Ferritin             (Z12.4) Screening for malignant neoplasm of cervix  Comment:   Plan: Pap screen with HPV - recommended age 30 - 65         years, REVIEW OF HEALTH MAINTENANCE PROTOCOL         ORDERS, Cancer Risk Mgmt/Cancer Genetic         Counseling Referral              (Z80.3) Family history of malignant neoplasm of breast  Comment:   Plan: Cancer Risk Mgmt/Cancer  "Genetic Counseling         Referral              Patient has been advised of split billing requirements and indicates understanding: Yes  COUNSELING:  Reviewed preventive health counseling, as reflected in patient instructions       watch BP     Estimated body mass index is 36.25 kg/m  as calculated from the following:    Height as of 10/16/20: 1.588 m (5' 2.5\").    Weight as of 7/17/21: 91.4 kg (201 lb 6.4 oz).    Weight management plan: has lost 15# a couple years ago.  is exercising consistently (did a triathalon)     She reports that she has never smoked. She has never used smokeless tobacco.      Counseling Resources:  ATP IV Guidelines  Pooled Cohorts Equation Calculator  Breast Cancer Risk Calculator  BRCA-Related Cancer Risk Assessment: FHS-7 Tool  FRAX Risk Assessment  ICSI Preventive Guidelines  Dietary Guidelines for Americans, 2010  USDA's MyPlate  ASA Prophylaxis  Lung CA Screening    Tayla Espinal MD  [facility]  "

## 2021-11-22 NOTE — PATIENT INSTRUCTIONS

## 2021-11-22 NOTE — RESULT ENCOUNTER NOTE
"Ave Willoughby    Thyroid, electrolytes, blood count are all within normal limits.     You have had a low ferritin for more than 10 years, without anemia.  You could take a daily over the counter iron, if tolerated (if you are not already).  This has been your \"normal\" for more than a decade.     Sincerely,       MALKA BLANKENSHIP M.D.  "

## 2021-11-24 LAB
BKR LAB AP GYN ADEQUACY: NORMAL
BKR LAB AP GYN INTERPRETATION: NORMAL
BKR LAB AP HPV REFLEX: NORMAL
BKR LAB AP PREVIOUS ABNORMAL: NORMAL
PATH REPORT.COMMENTS IMP SPEC: NORMAL
PATH REPORT.COMMENTS IMP SPEC: NORMAL
PATH REPORT.RELEVANT HX SPEC: NORMAL

## 2021-11-29 LAB
HUMAN PAPILLOMA VIRUS 16 DNA: NEGATIVE
HUMAN PAPILLOMA VIRUS 18 DNA: NEGATIVE
HUMAN PAPILLOMA VIRUS FINAL DIAGNOSIS: NORMAL
HUMAN PAPILLOMA VIRUS OTHER HR: NEGATIVE

## 2022-01-26 ENCOUNTER — TRANSFERRED RECORDS (OUTPATIENT)
Dept: HEALTH INFORMATION MANAGEMENT | Facility: CLINIC | Age: 57
End: 2022-01-26
Payer: COMMERCIAL

## 2022-02-22 ENCOUNTER — PATIENT OUTREACH (OUTPATIENT)
Dept: ONCOLOGY | Facility: CLINIC | Age: 57
End: 2022-02-22
Payer: COMMERCIAL

## 2022-02-22 NOTE — PROGRESS NOTES
Pt cancelled gentic counseling appointment today via [employer] for:  Other (Family conflicts )    Sent referral back to [facility] (new patient scheduling) to see if she would like to reschedule.

## 2022-02-25 ENCOUNTER — TRANSFERRED RECORDS (OUTPATIENT)
Dept: HEALTH INFORMATION MANAGEMENT | Facility: CLINIC | Age: 57
End: 2022-02-25
Payer: COMMERCIAL

## 2022-04-06 DIAGNOSIS — E03.4 HYPOTHYROIDISM DUE TO ACQUIRED ATROPHY OF THYROID: ICD-10-CM

## 2022-04-06 RX ORDER — LEVOTHYROXINE SODIUM 75 UG/1
75 TABLET ORAL DAILY
Qty: 90 TABLET | Refills: 3 | Status: SHIPPED | OUTPATIENT
Start: 2022-04-06 | End: 2023-06-06

## 2022-05-26 ENCOUNTER — TRANSFERRED RECORDS (OUTPATIENT)
Dept: HEALTH INFORMATION MANAGEMENT | Facility: CLINIC | Age: 57
End: 2022-05-26
Payer: COMMERCIAL

## 2022-10-30 ENCOUNTER — HEALTH MAINTENANCE LETTER (OUTPATIENT)
Age: 57
End: 2022-10-30

## 2022-11-03 ENCOUNTER — TRANSFERRED RECORDS (OUTPATIENT)
Dept: HEALTH INFORMATION MANAGEMENT | Facility: CLINIC | Age: 57
End: 2022-11-03

## 2022-12-15 ASSESSMENT — ENCOUNTER SYMPTOMS
SORE THROAT: 0
FEVER: 0
PALPITATIONS: 0
CHILLS: 0
ARTHRALGIAS: 1
EYE PAIN: 0
HEARTBURN: 0
MYALGIAS: 0
NAUSEA: 0
JOINT SWELLING: 1
WEAKNESS: 0
HEMATOCHEZIA: 0
COUGH: 0
DIARRHEA: 0
PARESTHESIAS: 0
SHORTNESS OF BREATH: 0
HEMATURIA: 0
BREAST MASS: 0
CONSTIPATION: 0
NERVOUS/ANXIOUS: 0
HEADACHES: 0
DYSURIA: 0
FREQUENCY: 0
ABDOMINAL PAIN: 0
DIZZINESS: 0

## 2022-12-16 ENCOUNTER — OFFICE VISIT (OUTPATIENT)
Dept: INTERNAL MEDICINE | Facility: CLINIC | Age: 57
End: 2022-12-16
Payer: COMMERCIAL

## 2022-12-16 ENCOUNTER — ANCILLARY PROCEDURE (OUTPATIENT)
Dept: MAMMOGRAPHY | Facility: CLINIC | Age: 57
End: 2022-12-16
Payer: COMMERCIAL

## 2022-12-16 VITALS
TEMPERATURE: 98.5 F | WEIGHT: 199 LBS | HEIGHT: 63 IN | BODY MASS INDEX: 35.26 KG/M2 | SYSTOLIC BLOOD PRESSURE: 160 MMHG | DIASTOLIC BLOOD PRESSURE: 90 MMHG | OXYGEN SATURATION: 98 % | HEART RATE: 60 BPM

## 2022-12-16 DIAGNOSIS — Z79.1 NSAID LONG-TERM USE: ICD-10-CM

## 2022-12-16 DIAGNOSIS — Z12.31 VISIT FOR SCREENING MAMMOGRAM: ICD-10-CM

## 2022-12-16 DIAGNOSIS — Z00.01 ENCOUNTER FOR GENERAL ADULT MEDICAL EXAMINATION WITH ABNORMAL FINDINGS: Primary | ICD-10-CM

## 2022-12-16 DIAGNOSIS — Z82.61 FAMILY HISTORY OF INFLAMMATORY ARTHRITIS: ICD-10-CM

## 2022-12-16 DIAGNOSIS — Z13.6 CARDIOVASCULAR SCREENING; LDL GOAL LESS THAN 130: ICD-10-CM

## 2022-12-16 DIAGNOSIS — Z80.0 FAMILY HISTORY OF COLON CANCER: ICD-10-CM

## 2022-12-16 DIAGNOSIS — M15.0 PRIMARY OSTEOARTHRITIS INVOLVING MULTIPLE JOINTS: ICD-10-CM

## 2022-12-16 DIAGNOSIS — E03.4 HYPOTHYROIDISM DUE TO ACQUIRED ATROPHY OF THYROID: ICD-10-CM

## 2022-12-16 LAB
ANION GAP SERPL CALCULATED.3IONS-SCNC: 4 MMOL/L (ref 3–14)
BUN SERPL-MCNC: 13 MG/DL (ref 7–30)
CALCIUM SERPL-MCNC: 9.3 MG/DL (ref 8.5–10.1)
CHLORIDE BLD-SCNC: 108 MMOL/L (ref 94–109)
CHOLEST SERPL-MCNC: 185 MG/DL
CO2 SERPL-SCNC: 29 MMOL/L (ref 20–32)
CREAT SERPL-MCNC: 0.8 MG/DL (ref 0.52–1.04)
CRP SERPL-MCNC: <2.9 MG/L (ref 0–8)
ERYTHROCYTE [DISTWIDTH] IN BLOOD BY AUTOMATED COUNT: 15.2 % (ref 10–15)
FASTING STATUS PATIENT QL REPORTED: YES
GFR SERPL CREATININE-BSD FRML MDRD: 85 ML/MIN/1.73M2
GLUCOSE BLD-MCNC: 96 MG/DL (ref 70–99)
HBA1C MFR BLD: 5.7 % (ref 0–5.6)
HCT VFR BLD AUTO: 40.3 % (ref 35–47)
HDLC SERPL-MCNC: 62 MG/DL
HGB BLD-MCNC: 12.8 G/DL (ref 11.7–15.7)
LDLC SERPL CALC-MCNC: 110 MG/DL
MCH RBC QN AUTO: 26.5 PG (ref 26.5–33)
MCHC RBC AUTO-ENTMCNC: 31.8 G/DL (ref 31.5–36.5)
MCV RBC AUTO: 83 FL (ref 78–100)
NONHDLC SERPL-MCNC: 123 MG/DL
PLATELET # BLD AUTO: 375 10E3/UL (ref 150–450)
POTASSIUM BLD-SCNC: 4 MMOL/L (ref 3.4–5.3)
RBC # BLD AUTO: 4.83 10E6/UL (ref 3.8–5.2)
SODIUM SERPL-SCNC: 141 MMOL/L (ref 133–144)
TRIGL SERPL-MCNC: 63 MG/DL
TSH SERPL DL<=0.005 MIU/L-ACNC: 3.77 MU/L (ref 0.4–4)
WBC # BLD AUTO: 4.6 10E3/UL (ref 4–11)

## 2022-12-16 PROCEDURE — 36415 COLL VENOUS BLD VENIPUNCTURE: CPT | Performed by: INTERNAL MEDICINE

## 2022-12-16 PROCEDURE — 80048 BASIC METABOLIC PNL TOTAL CA: CPT | Performed by: INTERNAL MEDICINE

## 2022-12-16 PROCEDURE — 99213 OFFICE O/P EST LOW 20 MIN: CPT | Mod: 25 | Performed by: INTERNAL MEDICINE

## 2022-12-16 PROCEDURE — 86140 C-REACTIVE PROTEIN: CPT | Performed by: INTERNAL MEDICINE

## 2022-12-16 PROCEDURE — 77067 SCR MAMMO BI INCL CAD: CPT | Mod: TC | Performed by: RADIOLOGY

## 2022-12-16 PROCEDURE — 80061 LIPID PANEL: CPT | Performed by: INTERNAL MEDICINE

## 2022-12-16 PROCEDURE — 83036 HEMOGLOBIN GLYCOSYLATED A1C: CPT | Performed by: INTERNAL MEDICINE

## 2022-12-16 PROCEDURE — 84443 ASSAY THYROID STIM HORMONE: CPT | Performed by: INTERNAL MEDICINE

## 2022-12-16 PROCEDURE — 99396 PREV VISIT EST AGE 40-64: CPT | Performed by: INTERNAL MEDICINE

## 2022-12-16 PROCEDURE — 77063 BREAST TOMOSYNTHESIS BI: CPT | Mod: TC | Performed by: RADIOLOGY

## 2022-12-16 PROCEDURE — 85027 COMPLETE CBC AUTOMATED: CPT | Performed by: INTERNAL MEDICINE

## 2022-12-16 RX ORDER — FAMOTIDINE 20 MG
TABLET ORAL
COMMUNITY

## 2022-12-16 RX ORDER — MELOXICAM 7.5 MG/1
TABLET ORAL DAILY
COMMUNITY
Start: 2022-03-01 | End: 2023-03-15

## 2022-12-16 ASSESSMENT — ENCOUNTER SYMPTOMS
SHORTNESS OF BREATH: 0
NAUSEA: 0
HEARTBURN: 0
COUGH: 0
ABDOMINAL PAIN: 0
MYALGIAS: 0
PARESTHESIAS: 0
FEVER: 0
JOINT SWELLING: 1
DIZZINESS: 0
FREQUENCY: 0
EYE PAIN: 0
DIARRHEA: 0
CHILLS: 0
BREAST MASS: 0
DYSURIA: 0
HEADACHES: 0
HEMATOCHEZIA: 0
PALPITATIONS: 0
WEAKNESS: 0
HEMATURIA: 0
ARTHRALGIAS: 1
CONSTIPATION: 0
NERVOUS/ANXIOUS: 0
SORE THROAT: 0

## 2022-12-16 NOTE — PROGRESS NOTES
SUBJECTIVE:   CC: Cindy is an 57 year old who presents for preventive health visit.   Patient has been advised of split billing requirements and indicates understanding: Yes  Healthy Habits:     Getting at least 3 servings of Calcium per day:  Yes    Bi-annual eye exam:  Yes    Dental care twice a year:  Yes    Sleep apnea or symptoms of sleep apnea:  None    Diet:  Regular (no restrictions)    Frequency of exercise:  4-5 days/week    Duration of exercise:  45-60 minutes    Taking medications regularly:  Yes    Medication side effects:  None    PHQ-2 Total Score: 0    Additional concerns today:  Yes    56 y/o F here for AFE.      h/o  Lumbar SS, hypothyroid and h/o TAMIKA.    >Has some djd in lower body, has had  injections  via [facility] .  Has had MRIs in past showing bilateral L5/S1 moderate foramen stenosis - has R leg numbness in L5/S1 pattern.   Working with [facility] on Knee pain      > Mom  this past year.       Had been eligible for genetic counseling but canceled due to schedule conflict     BP in field have been 120's      Discuss arthritis pain - more pain in small joint. . , dry eye and mouth -- sister has Sjogrens.  Uncle RA.       Today's PHQ-2 Score:   PHQ-2 (  [employer]) 12/15/2022   Q1: Little interest or pleasure in doing things 0   Q2: Feeling down, depressed or hopeless 0   PHQ-2 Score 0   PHQ-2 Total Score (12-17 Years)- Positive if 3 or more points; Administer PHQ-A if positive -   Q1: Little interest or pleasure in doing things Not at all   Q2: Feeling down, depressed or hopeless Not at all   PHQ-2 Score 0           Social History     Tobacco Use     Smoking status: Never     Smokeless tobacco: Never   Substance Use Topics     Alcohol use: Yes     Comment: 5 drinks a week     If you drink alcohol do you typically have >3 drinks per day or >7 drinks per week? No    Alcohol Use 12/15/2022   Prescreen: >3 drinks/day or >7 drinks/week? No   Prescreen: >3 drinks/day or >7 drinks/week? -        Reviewed orders with patient.  Reviewed health maintenance and updated orders accordingly - Yes  Lab work is in process  Labs reviewed in EPIC  BP Readings from Last 3 Encounters:   12/16/22 (!) 167/92   11/22/21 (!) 154/88   07/17/21 133/76    Wt Readings from Last 3 Encounters:   12/16/22 90.3 kg (199 lb)   11/22/21 90.7 kg (200 lb)   07/17/21 91.4 kg (201 lb 6.4 oz)                  Patient Active Problem List   Diagnosis     CARDIOVASCULAR SCREENING; LDL GOAL LESS THAN 130     Lumbar pain     Spondylolisthesis of lumbar region     Lumbar foraminal stenosis     Sacroiliac pain     Family history of colon cancer     Iron deficiency anemia due to chronic blood loss     Hypothyroidism due to acquired atrophy of thyroid     NSAID long-term use     Past Surgical History:   Procedure Laterality Date     APPENDECTOMY  4/11/2011    Dr Lona Duncan     COLONOSCOPY  9-29-08     COLONOSCOPY N/A 8/9/2018    Procedure: COMBINED COLONOSCOPY, SINGLE OR MULTIPLE BIOPSY/POLYPECTOMY BY BIOPSY;;  Surgeon: Lc Cervantes MD;  Location: [facility]     COLONOSCOPY WITH CO2 INSUFFLATION N/A 8/9/2018    Procedure: COLONOSCOPY WITH CO2 INSUFFLATION;  COLON SCREEN/ PATTIE;  Surgeon: Lc Cervantes MD;  Location: [facility] OR     ENDOSCOPY UPPER, COLONOSCOPY, COMBINED  7/1/2013    Procedure: COMBINED ENDOSCOPY UPPER, COLONOSCOPY;  COLONOSCOPY SCREEN-FAMILY HX OF COLON CANCER/ EGD-UPPER ENDOSCOPY-UPPER GI SYMPTOMS/ PATTIE;  Surgeon: Lc Cervantes MD;  Location: [facility] OR     ESOPHAGOSCOPY, GASTROSCOPY, DUODENOSCOPY (EGD), COMBINED  7/1/2013    Procedure: COMBINED ESOPHAGOSCOPY, GASTROSCOPY, DUODENOSCOPY (EGD), BIOPSY SINGLE OR MULTIPLE;;  Surgeon: Lc Cervantes MD;  Location: [facility] OR       Social History     Tobacco Use     Smoking status: Never     Smokeless tobacco: Never   Substance Use Topics     Alcohol use: Yes     Comment: 5 drinks a week     Family History   Problem Relation Age of Onset     Breast  Cancer Mother      Thyroid Disease Mother      Cancer - colorectal Father      Cancer Father         brain     Diabetes Maternal Grandmother      Cardiovascular Paternal Grandmother      Thyroid Disease Sister          Current Outpatient Medications   Medication Sig Dispense Refill     famotidine (PEPCID) 20 MG tablet Take 20 mg by mouth daily       levothyroxine (SYNTHROID/LEVOTHROID) 75 MCG tablet TAKE 1 TABLET (75 MCG) BY MOUTH DAILY 90 tablet 3     meloxicam (MOBIC) 7.5 MG tablet daily       Multiple Vitamin (MULTIVITAMIN ADULT PO)        Vitamin D, Cholecalciferol, 25 MCG (1000 UT) CAPS        ibuprofen (ADVIL/MOTRIN) 200 MG capsule Take 2 capsules (400 mg) by mouth every 4 hours as needed for fever 120 capsule 1     Rhubarb 4 MG TABS        Allergies   Allergen Reactions     Pcn [Penicillins]      Recent Labs   Lab Test 12/16/22  0852 11/22/21  0804 10/16/20  1615 11/21/19  1613 11/08/18  1542 09/02/16  1006 10/15/15  0808   A1C 5.7*  --   --   --   --   --   --    LDL  --   --   --   --  83  --  73   HDL  --   --   --   --  52  --  46*   TRIG  --   --   --   --  130  --  79   CR  --  0.79 0.80 0.88  --    < > 0.83   GFRESTIMATED  --  83 82 74  --    < > 72   GFRESTBLACK  --   --  >90 86  --    < > 88   POTASSIUM  --  4.1 3.9 3.7  --    < > 4.1   TSH  --  4.23* 2.20 4.36* 3.51   < > 2.69    < > = values in this interval not displayed.        Breast Cancer Screening:    FHS-7:   Breast CA Risk Assessment (FHS-7) 11/4/2021 11/16/2021 12/16/2022   Did any of your first-degree relatives have breast or ovarian cancer? Yes Yes Yes   Did any of your relatives have bilateral breast cancer? No No No   Did any man in your family have breast cancer? No No No   Did any woman in your family have breast and ovarian cancer? No No No   Did any woman in your family have breast cancer before age 50 y? No No No   Do you have 2 or more relatives with breast and/or ovarian cancer? No No No   Do you have 2 or more relatives with  "breast and/or bowel cancer? No Yes No     She will defer genetic counseling.      Mammogram Screening: Recommended mammography every 1-2 years with patient discussion and risk factor consideration and    Pertinent mammograms are reviewed under the imaging tab.    History of abnormal Pap smear:   Last 3 Pap Results:   PAP (no units)   Date Value   09/02/2016 NIL     PAP / HPV Latest Ref Rng & Units 11/22/2021 9/2/2016   PAP   Negative for Intraepithelial Lesion or Malignancy (NILM) -   PAP (Historical) - - NIL   HPV16 Negative Negative Negative   HPV18 Negative Negative Negative   HRHPV Negative Negative Negative     Reviewed and updated as needed this visit by clinical staff                  Reviewed and updated as needed this visit by Provider                     Review of Systems   Constitutional: Negative for chills and fever.   HENT: Negative for congestion, ear pain, hearing loss and sore throat.    Eyes: Negative for pain and visual disturbance.   Respiratory: Negative for cough and shortness of breath.    Cardiovascular: Negative for chest pain, palpitations and peripheral edema.   Gastrointestinal: Negative for abdominal pain, constipation, diarrhea, heartburn, hematochezia and nausea.   Breasts:  Negative for tenderness, breast mass and discharge.   Genitourinary: Negative for dysuria, frequency, genital sores, hematuria, pelvic pain, urgency, vaginal bleeding and vaginal discharge.   Musculoskeletal: Positive for arthralgias and joint swelling. Negative for myalgias.   Skin: Negative for rash.   Neurological: Negative for dizziness, weakness, headaches and paresthesias.   Psychiatric/Behavioral: Negative for mood changes. The patient is not nervous/anxious.            OBJECTIVE:   BP (!) 167/92 (BP Location: Right arm, Patient Position: Sitting, Cuff Size: Adult Large)   Pulse 60   Temp 98.5  F (36.9  C) (Oral)   Ht 1.588 m (5' 2.5\")   Wt 90.3 kg (199 lb)   LMP 06/24/2012   SpO2 98%   BMI 35.82 " kg/m    Physical Exam  GENERAL: healthy, alert and no distress  EYES: Eyes grossly normal to inspection, PERRL and conjunctivae and sclerae normal  HENT: ear canals and TM's normal, nose and mouth without ulcers or lesions  NECK: no adenopathy, no asymmetry, masses, or scars and thyroid normal to palpation  RESP: lungs clear to auscultation - no rales, rhonchi or wheezes  BREAST: normal without masses, tenderness or nipple discharge and no palpable axillary masses or adenopathy  CV: regular rate and rhythm, normal S1 S2, no S3 or S4, no murmur, click or rub, no peripheral edema and peripheral pulses strong  ABDOMEN: soft, nontender, no hepatosplenomegaly, no masses and bowel sounds normal   (female): defer  MS: no gross musculoskeletal defects noted, no edema  MS: no ulner deviation nor swan neck deformity   SKIN: no suspicious lesions or rashes  NEURO: Normal strength and tone, mentation intact and speech normal  PSYCH: mentation appears normal, affect normal/bright    Diagnostic Test Results:  Labs reviewed in Epic  See orders.     ASSESSMENT/PLAN:     (Z00.01) Encounter for general adult medical examination with abnormal findings  (primary encounter diagnosis)  Comment:  BP normal at home  She will monitor and send me results     Plan:      (E03.4) Hypothyroidism due to acquired atrophy of thyroid  Comment:  euthryoid   Plan: Hemoglobin A1c, TSH with free T4 reflex,         OFFICE/OUTPT VISIT,EST,LEVL II             (Z79.1) NSAID long-term use  Comment:  mobic helps dejenerative joint arthritis    Monitor bmp and cbc    Plan: Basic metabolic panel, CBC with platelets,         OFFICE/OUTPT VISIT,EST,LEVL II             (Z80.0) Family history of colon cancer  Comment:  Due for colonoscopy next summer   Plan:      (Z13.6) CARDIOVASCULAR SCREENING; LDL GOAL LESS THAN 130  Comment:    Plan: Lipid panel reflex to direct LDL Fasting             (Z82.61) Family history of inflammatory arthritis  Comment:  Screen for  inflammatory arthritis.  Her joint symptoms likely dejenerative joint arthritis   Plan: CRP, inflammation, Cyclic Citrullinated Peptide        Antibody IgG, SSA Ro MARCIO Antibody IgG, SSB La         MARCIO Antibody IgG       (M15.9) Primary osteoarthritis involving multiple joints  Comment:  Screen for inflammatory arthritis.  Her joint symptoms likely dejenerative joint arthritis   Plan: CRP, inflammation, Cyclic Citrullinated Peptide        Antibody IgG, SSA Ro MARCIO Antibody IgG, SSB La         MARCIO Antibody IgG, OFFICE/OUTPT VISIT,EST,LEVL         II                  COUNSELING:  Reviewed preventive health counseling, as reflected in patient instructions       Colorectal Cancer Screening        She reports that she has never smoked. She has never used smokeless tobacco.          Tayla Espinal MD  [facility]

## 2022-12-16 NOTE — PATIENT INSTRUCTIONS
"  Check BP at home, send me results.   Consider \"ancillary Visit\" for BP ... bring your cuff to correlate.      Colonoscopy 8/2023.      Limit sodium intake < 1800 mg sodium daily    "

## 2022-12-19 NOTE — RESULT ENCOUNTER NOTE
"Ave Willoughby    Electrolyte, kidney function and blood counts are normal .   HgbA1C is in the \"prediabetes\" range.   Work on weight loss/food choices via low sodium diet.   Keeping weight down is better for your sugar metabolism:  delaying onset of diabetes.   Thyroid is in the normal range:  no changes needed.   CRP is normal -- the other inflammatory labs we added are still pending.     Sincerely,       AMLKA BLANKENSHIP M.D.        "

## 2023-03-09 ENCOUNTER — ANCILLARY PROCEDURE (OUTPATIENT)
Dept: CARDIOLOGY | Facility: CLINIC | Age: 58
End: 2023-03-09
Attending: INTERNAL MEDICINE
Payer: COMMERCIAL

## 2023-03-09 ENCOUNTER — OFFICE VISIT (OUTPATIENT)
Dept: INTERNAL MEDICINE | Facility: CLINIC | Age: 58
End: 2023-03-09
Payer: COMMERCIAL

## 2023-03-09 VITALS
WEIGHT: 201 LBS | SYSTOLIC BLOOD PRESSURE: 147 MMHG | HEIGHT: 63 IN | OXYGEN SATURATION: 94 % | HEART RATE: 82 BPM | TEMPERATURE: 99.1 F | BODY MASS INDEX: 35.61 KG/M2 | DIASTOLIC BLOOD PRESSURE: 58 MMHG

## 2023-03-09 DIAGNOSIS — R00.2 PALPITATIONS: Primary | ICD-10-CM

## 2023-03-09 DIAGNOSIS — Z82.61 FAMILY HISTORY OF INFLAMMATORY ARTHRITIS: ICD-10-CM

## 2023-03-09 DIAGNOSIS — M15.0 PRIMARY OSTEOARTHRITIS INVOLVING MULTIPLE JOINTS: ICD-10-CM

## 2023-03-09 DIAGNOSIS — Z79.1 NSAID LONG-TERM USE: ICD-10-CM

## 2023-03-09 DIAGNOSIS — E03.4 HYPOTHYROIDISM DUE TO ACQUIRED ATROPHY OF THYROID: ICD-10-CM

## 2023-03-09 DIAGNOSIS — R00.2 PALPITATIONS: ICD-10-CM

## 2023-03-09 DIAGNOSIS — R76.8 ELEVATED ANTINUCLEAR ANTIBODY (ANA) LEVEL: ICD-10-CM

## 2023-03-09 PROCEDURE — 86200 CCP ANTIBODY: CPT | Performed by: INTERNAL MEDICINE

## 2023-03-09 PROCEDURE — 86039 ANTINUCLEAR ANTIBODIES (ANA): CPT | Performed by: INTERNAL MEDICINE

## 2023-03-09 PROCEDURE — 36415 COLL VENOUS BLD VENIPUNCTURE: CPT | Performed by: INTERNAL MEDICINE

## 2023-03-09 PROCEDURE — 86431 RHEUMATOID FACTOR QUANT: CPT | Performed by: INTERNAL MEDICINE

## 2023-03-09 PROCEDURE — 99214 OFFICE O/P EST MOD 30 MIN: CPT | Performed by: INTERNAL MEDICINE

## 2023-03-09 PROCEDURE — 93248 EXT ECG>7D<15D REV&INTERPJ: CPT | Performed by: INTERNAL MEDICINE

## 2023-03-09 PROCEDURE — 86038 ANTINUCLEAR ANTIBODIES: CPT | Performed by: INTERNAL MEDICINE

## 2023-03-09 PROCEDURE — 93000 ELECTROCARDIOGRAM COMPLETE: CPT | Performed by: INTERNAL MEDICINE

## 2023-03-09 PROCEDURE — 86235 NUCLEAR ANTIGEN ANTIBODY: CPT | Performed by: INTERNAL MEDICINE

## 2023-03-09 PROCEDURE — 93246 EXT ECG>7D<15D RECORDING: CPT | Performed by: INTERNAL MEDICINE

## 2023-03-09 NOTE — LETTER
March 27, 2023      Cindy Willoughby  [address] [address] [address]        Dear Cindy:    Enclosed is a copy of your test results.    The cardiac monitor showed a few isolated, self limited fast rhythms which were not perceived by you.   No malignant or sustained rhythm disturbances.           Basically within normal.     If you have any questions or concerns, please call the clinic at the number listed above.     Sincerely,      Tayla Espinal MD/[facility]

## 2023-03-09 NOTE — PROGRESS NOTES
I have place a 10 days Zio Patch on this patient. Patient was given Zio Patch instructions and [employer] contact information. Patient understands when they should remove and return their monitor to [employer].     KATE SOLORIO A/INOCENTE

## 2023-03-09 NOTE — PROGRESS NOTES
"  Assessment & Plan     Palpitations  Will check ziopatch.   If negative, maybe watchful waiting?   Labs 3 months ago all normal.   - EKG 12-lead complete w/read - Clinics  - Adult Leadless EKG Monitor 8 to 14 Days; Future    Hypothyroidism due to acquired atrophy of thyroid   euthryoid     NSAID long-term use  Stopped meloxicam       Family history of inflammatory arthritis   will work up: (forgot to order last time: )   - SSB La MARCIO Antibody IgG  - SSA Ro MARCIO Antibody IgG  - Cyclic Citrullinated Peptide Antibody IgG    Primary osteoarthritis involving multiple joints     - SSB La MARCIO Antibody IgG  - SSA Ro MARCIO Antibody IgG  - Cyclic Citrullinated Peptide Antibody IgG             BMI:   Estimated body mass index is 36.18 kg/m  as calculated from the following:    Height as of this encounter: 1.588 m (5' 2.5\").    Weight as of this encounter: 91.2 kg (201 lb).           No follow-ups on file.    Tayla Espinal MD  [facility] RUBEN White is a 57 year old, presenting for the following health issues:  No chief complaint on file.      56 y/o F here for f/u.    H/o h/o  Lumbar SS, hypothyroid and h/o TAMIKA.   :  She c/o frequent headaches > Stopped daily meloxicam and then Headaches went away.. BP improved, too      She has also noted palpitations, intermittent.. random    Self limited.   Lasts less than a minute each episode.  About 3-4 times per week.  Feels somewhat orthostatic during each episode.  Feels \"fluttery\" or \"racey\" during the time.  Has not changed since stopped meloxicam.     Mom has Afib.        History of Present Illness       Reason for visit:  Heart fluttering, occasional lightheadedness  Symptom onset:  More than a month  Symptom intensity:  Mild  Symptom progression:  Staying the same  Had these symptoms before:  No    She eats 4 or more servings of fruits and vegetables daily.She consumes 0 sweetened beverage(s) daily.She exercises with enough effort to increase " "her heart rate 30 to 60 minutes per day.  She exercises with enough effort to increase her heart rate 4 days per week.   She is taking medications regularly.             Review of Systems   Constitutional, HEENT, cardiovascular, pulmonary, gi and gu systems are negative, except as otherwise noted.      Objective    BP (!) 147/58 (BP Location: Left arm, Patient Position: Sitting, Cuff Size: Adult Large)   Pulse 82   Temp 99.1  F (37.3  C) (Oral)   Ht 1.588 m (5' 2.5\")   Wt 91.2 kg (201 lb)   LMP 06/24/2012   SpO2 94%   BMI 36.18 kg/m    Body mass index is 36.18 kg/m .  Home BP has been 135/79....    Physical Exam   GENERAL: healthy, alert and no distress  EYES: Eyes grossly normal to inspection, PERRL and conjunctivae and sclerae normal  RESP: lungs clear to auscultation - no rales, rhonchi or wheezes  CV: regular rate and rhythm, normal S1 S2, no S3 or S4, no murmur, click or rub, no peripheral edema and peripheral pulses strong  SKIN: no suspicious lesions or rashes  NEURO: Normal strength and tone, mentation intact and speech normal  PSYCH: mentation appears normal, affect normal/bright    EKG - Reviewed and interpreted by me appears normal, NSR, normal axis, normal intervals, no acute ST/T changes c/w ischemia, no LVH by voltage criteria, unchanged from previous tracings                     "

## 2023-03-10 LAB — RHEUMATOID FACT SER NEPH-ACNC: <7 IU/ML

## 2023-03-13 LAB
CCP AB SER IA-ACNC: 1.1 U/ML
ENA SS-A AB SER IA-ACNC: <0.5 U/ML
ENA SS-A AB SER IA-ACNC: NEGATIVE
ENA SS-B IGG SER IA-ACNC: <0.6 U/ML
ENA SS-B IGG SER IA-ACNC: NEGATIVE

## 2023-03-14 LAB
ANA PAT SER IF-IMP: ABNORMAL
ANA SER QL IF: POSITIVE
ANA TITR SER IF: ABNORMAL {TITER}

## 2023-03-15 NOTE — RESULT ENCOUNTER NOTE
"Ave Willoughby    The \"NENITA\" level is indeed elevated.  It is interesting yet not diagnostic.   In isolation (meaning, all the other inflammatory labs ar normal) it can just be an artifact//\"red herring\".   I find these cases extremely challenging to say for sure to be honest.        I entered a referral to a Rheumatologist for further evaluation and treatment.  They are booked out a bit, but put this on your calendar.  Meanwhile, get as much family history about any inflammatory arthritis/conditions (like crohns, ulcerative colitis, rheumatoid arthritis, lupus.... ) as you can.  A more accurate family history may be helpful at your visit.      A   will call you within 1 business day to help schedule your appointment, or you may contact the [facility] Representative (Rheumatology) at [phone]    MALKA Rodríguez M.D.  "

## 2023-03-27 NOTE — RESULT ENCOUNTER NOTE
Ave Willoughby    The cardiac monitor showed a few isolated, self limited fast rhythms which were not perceived by you.   No malignant or sustained rhythm disturbances.           Basically within normal.     Marcos,       MALKA BLANKENSHIP M.D.

## 2023-04-19 ENCOUNTER — TRANSFERRED RECORDS (OUTPATIENT)
Dept: HEALTH INFORMATION MANAGEMENT | Facility: CLINIC | Age: 58
End: 2023-04-19

## 2023-06-05 ENCOUNTER — MYC MEDICAL ADVICE (OUTPATIENT)
Dept: INTERNAL MEDICINE | Facility: CLINIC | Age: 58
End: 2023-06-05

## 2023-06-05 DIAGNOSIS — E03.4 HYPOTHYROIDISM DUE TO ACQUIRED ATROPHY OF THYROID: ICD-10-CM

## 2023-06-05 DIAGNOSIS — Z12.11 SPECIAL SCREENING FOR MALIGNANT NEOPLASMS, COLON: Primary | ICD-10-CM

## 2023-06-05 DIAGNOSIS — Z86.0100 HISTORY OF COLONIC POLYPS: ICD-10-CM

## 2023-06-06 PROBLEM — Z86.0100 HISTORY OF COLONIC POLYPS: Status: ACTIVE | Noted: 2023-06-06

## 2023-06-06 PROBLEM — Z79.1 NSAID LONG-TERM USE: Status: RESOLVED | Noted: 2020-10-15 | Resolved: 2023-06-06

## 2023-06-06 RX ORDER — LEVOTHYROXINE SODIUM 75 UG/1
75 TABLET ORAL DAILY
Qty: 90 TABLET | Refills: 1 | Status: SHIPPED | OUTPATIENT
Start: 2023-06-06 | End: 2023-12-22

## 2023-06-13 ENCOUNTER — OFFICE VISIT (OUTPATIENT)
Dept: URGENT CARE | Facility: URGENT CARE | Age: 58
End: 2023-06-13
Payer: COMMERCIAL

## 2023-06-13 VITALS
DIASTOLIC BLOOD PRESSURE: 90 MMHG | HEART RATE: 87 BPM | WEIGHT: 205 LBS | SYSTOLIC BLOOD PRESSURE: 147 MMHG | TEMPERATURE: 100.6 F | RESPIRATION RATE: 18 BRPM | BODY MASS INDEX: 36.9 KG/M2 | OXYGEN SATURATION: 99 %

## 2023-06-13 DIAGNOSIS — J02.0 STREP THROAT: Primary | ICD-10-CM

## 2023-06-13 DIAGNOSIS — R07.0 THROAT PAIN: ICD-10-CM

## 2023-06-13 LAB — DEPRECATED S PYO AG THROAT QL EIA: POSITIVE

## 2023-06-13 PROCEDURE — 99213 OFFICE O/P EST LOW 20 MIN: CPT | Performed by: PHYSICIAN ASSISTANT

## 2023-06-13 PROCEDURE — 87880 STREP A ASSAY W/OPTIC: CPT | Performed by: PHYSICIAN ASSISTANT

## 2023-06-13 RX ORDER — CEPHALEXIN 500 MG/1
500 CAPSULE ORAL 2 TIMES DAILY
Qty: 20 CAPSULE | Refills: 0 | Status: SHIPPED | OUTPATIENT
Start: 2023-06-13 | End: 2023-06-23

## 2023-07-06 ENCOUNTER — TRANSFERRED RECORDS (OUTPATIENT)
Dept: HEALTH INFORMATION MANAGEMENT | Facility: CLINIC | Age: 58
End: 2023-07-06

## 2023-07-11 ENCOUNTER — TELEPHONE (OUTPATIENT)
Dept: GASTROENTEROLOGY | Facility: CLINIC | Age: 58
End: 2023-07-11

## 2023-07-11 NOTE — TELEPHONE ENCOUNTER
"Endoscopy Scheduling Screen    Have you had a positive Covid test in the last 14 days?  No    Are you active on MyChart?   Yes    What insurance is in the chart?  Other:  [facility]    Ordering/Referring Provider: Beulah Marte MD in  INTERNAL MEDICINE   (If ordering provider performs procedure, schedule with ordering provider unless otherwise instructed. )    BMI: Estimated body mass index is 36.9 kg/m  as calculated from the following:    Height as of 3/9/23: 1.588 m (5' 2.5\").    Weight as of 6/13/23: 93 kg (205 lb).     Sedation Ordered  moderate sedation.   If patient BMI > 50 do not schedule in [facility].    Are you taking any prescription medications for pain?   No    Are you taking methadone or Suboxone?  No    Do you have a history of malignant hyperthermia or adverse reaction to anesthesia?  No    (Females) Are you currently pregnant?   No     Have you been diagnosed or told you have pulmonary hypertension?   No    Do you have an LVAD?  No    Have you been told you have moderate to severe sleep apnea?  No    Have you been told you have COPD, asthma, or any other lung disease?  No    Do you have any heart conditions?  No     Have you ever had or are you awaiting a heart or lung transplant?   No    Have you had a stroke or transient ischemic attack (TIA aka \"mini stroke\" in the last 6 months?   No    Have you been diagnosed with or been told you have cirrhosis of the liver?   No    Are you currently on dialysis?   No    Do you need assistance transferring?   No    BMI: Estimated body mass index is 36.9 kg/m  as calculated from the following:    Height as of 3/9/23: 1.588 m (5' 2.5\").    Weight as of 6/13/23: 93 kg (205 lb).     Is patients BMI > 40 and scheduling location [facility]?  No    Do you take the medication Phentermine, Ozempic or Wegovy?  No    Do you take the medication Naltrexone?  No    Do you take blood thinners?  No      Prep   Are you currently on dialysis or do you have chronic kidney " disease?  No    Do you have a diagnosis of diabetes?  No    Do you have a diagnosis of cystic fibrosis (CF)?  No    On a regular basis do you go 3 -5 days between bowel movements?  No    BMI > 40?  No    [place]erred Pharm[place]    [place] [address] [facility]  [address] [address] [address]  Phone: [phone] Fax: [phone]      Final Scheduling Details   Colonoscopy prep sent?  MiraLAX (No Mag Citrate)    Procedure scheduled  Colonoscopy    Surgeon:  Yosvany      Date of procedure:  10/16/2023     Schedule PAC:   No    Location  [facility] - [facility]    Sedation   Moderate Sedation    Patient Reminders:    You will receive a call from a Nurse to review instructions and health history.  This assessment must be completed prior to your procedure.  Failure to complete the Nurse assessment may result in the procedure being cancelled.       On the day of your procedure, please designate an adult(s) who can drive you home stay with you for the next 24 hours. The medicines used in the exam will make you sleepy. You will not be able to drive.       You cannot take public transportation, ride share services, or non-medical taxi service without a responsible caregiver.  Medical transport services are allowed with the requirement that a responsible caregiver will receive you at your destination.  We require that drivers and caregivers are confirmed prior to your procedure.

## 2023-08-22 ENCOUNTER — OFFICE VISIT (OUTPATIENT)
Dept: RHEUMATOLOGY | Facility: CLINIC | Age: 58
End: 2023-08-22
Attending: INTERNAL MEDICINE
Payer: COMMERCIAL

## 2023-08-22 VITALS
SYSTOLIC BLOOD PRESSURE: 132 MMHG | HEART RATE: 63 BPM | DIASTOLIC BLOOD PRESSURE: 78 MMHG | WEIGHT: 206.8 LBS | OXYGEN SATURATION: 100 % | RESPIRATION RATE: 18 BRPM | BODY MASS INDEX: 37.22 KG/M2

## 2023-08-22 DIAGNOSIS — R68.2 DRY MOUTH: ICD-10-CM

## 2023-08-22 DIAGNOSIS — M17.0 PRIMARY OSTEOARTHRITIS OF BOTH KNEES: ICD-10-CM

## 2023-08-22 DIAGNOSIS — R76.8 ELEVATED ANTINUCLEAR ANTIBODY (ANA) LEVEL: Primary | ICD-10-CM

## 2023-08-22 DIAGNOSIS — M19.072 PRIMARY OSTEOARTHRITIS OF BOTH FEET: ICD-10-CM

## 2023-08-22 DIAGNOSIS — M19.042 PRIMARY OSTEOARTHRITIS OF BOTH HANDS: ICD-10-CM

## 2023-08-22 DIAGNOSIS — M19.041 PRIMARY OSTEOARTHRITIS OF BOTH HANDS: ICD-10-CM

## 2023-08-22 DIAGNOSIS — M19.071 PRIMARY OSTEOARTHRITIS OF BOTH FEET: ICD-10-CM

## 2023-08-22 PROCEDURE — 99204 OFFICE O/P NEW MOD 45 MIN: CPT | Performed by: INTERNAL MEDICINE

## 2023-08-22 RX ORDER — PILOCARPINE HYDROCHLORIDE 5 MG/1
TABLET, FILM COATED ORAL
Qty: 90 TABLET | Refills: 3 | Status: SHIPPED | OUTPATIENT
Start: 2023-08-22 | End: 2023-11-29

## 2023-08-22 NOTE — PROGRESS NOTES
Rheumatology Clinic Visit      Ave Willoughby MRN# [id number]   YOB: 1965 Age: 58 year old      Date of visit: 8/22/23   PCP: Dr. Tayla Espinal    Chief Complaint   Patient presents with:  Consult: Has pain, stiffness and swelling, knees are worse.  Had gel injection in knees. Sister has Sjogren and uncle has RA.      Assessment and Plan     1.  JUD 1:320 speckled: Dry eye and dry mouth.  Dry eyes are treated with artificial tears; uses contacts.  Advised that she try using preservative-free artificial tears, and to follow-up with her ophthalmologist to see about possibly getting contacts for dry eyes.  No dry mouth, she is already using Biotene products, sugar-free lozenges, sugar-free gum, avoidance of sugary/caffeinated/carbonated beverages, following with the dentist twice yearly and periodontist twice yearly for periodontal disease.  Still with significant dry mouth.  We reviewed the suspicion for Sjogren's syndrome, but the SSA and SSB were negative.  We discussed the option of minor salivary gland biopsy but this would not change the symptomatic treatment at this point, and therefore she would like to not proceed with the minor salivary gland biopsy.  Discussed using pilocarpine and she would like to proceed with this.  Risks and side effects of pilocarpine were reviewed in detail.  Chronic illness, progressive.    - Start Pilocarpine 5mg daily x7days, then 5mg twice daily x7days, then 5mg three times daily thereafter.      2. Primary osteoarthritis of both knees: Has improved with Synvisc injections from orthopedics.  Advised trying topical Voltaren gel PRN  - diclofenac (VOLTAREN) 1 % topical gel; Apply up to 2 grams of 1% gel to upper extremity and up to 4 grams of 1% gel to lower extremity up to 4 times daily as needed for joint pain      3. Primary osteoarthritis of both hands: Evidenced by squaring of bilateral first CMC joints Heberden's nodes, Luis's nodes.  Topical Voltaren  gel as needed.  Consider hand therapy in the future if needed.  - diclofenac (VOLTAREN) 1 % topical gel; Apply up to 2 grams of 1% gel to upper extremity and up to 4 grams of 1% gel to lower extremity up to 4 times daily as needed for joint pain  Dispense: 400 g; Refill: 2    4. Primary osteoarthritis of both feet: History of bunion surgery.  Currently with bony hypertrophy at the first MTPs.  Stiff soled shoes whenever ambulatory, indoors and outdoors, to prevent repetitive articulation at the MTPs.    Total minutes spent in evaluation with patient, documentation, , and review of pertinent studies and chart notes: 34     Ms. Willoughby verbalized agreement with and understanding of the rational for the diagnosis and treatment plan.  All questions were answered to best of my ability and the patient's satisfaction. Ms. Willoughby was advised to contact the clinic with any questions that may arise after the clinic visit.      Thank you for involving me in the care of the patient    Return to clinic: 3 months as needed; she prefers scheduling a follow-up appointment    HPI   Ave Willoughby is a 58 year old female with a past medical history significant for lumbar foraminal stenosis, hypothyroidism, and positive JUD who presents for traumatology evaluation of positive JUD by referral from Dr. Tayla Espinal.     Today, 8/22/2023: 3-5 years of worsening joint symptoms; feeling of swelling in the hands, knees, and feet; worse in the AM, better with activity.  Morning stiffness for about 2 hours.  Dry eyes/mouth/vaginal.  Wears contacts; uses artificial tears (one for when using contacts, and one for when contacts are not in).  Dry mouth tx'd with Biotene mouthwash at bedtime, occasional a biotene spray; sipping water several times per hour; sugar free gum sometimes; sugar free lozenges.  Seeing a dentist and periodontist each twice yearly. Canker sores 3-4 x/yr, occurring for as long as she can remember.; not  always in the same location, sometimes painful; hx of cavities but none for the past few years.    Staying active; doing a triathlon this weekend.     Had palpitations previously but none now.  Hypothyroidism on stable treatment for a while now.  Used to use meloxicam but no longer.    Has been seen at [facility] where Synvisc injection for each knee has been helpful    Denies fevers, chills, nausea, vomiting, constipation, diarrhea. No abdominal pain.  No black or bloody stools.  No chest pain/pressure, palpitations, or shortness of breath at this time. No LE swelling. No neck pain. No rash. No photosensitivity or photophobia. No eye pain or redness. No history of inflammatory eye or bowel disease.  No history of DVT, pulmonary embolism, or miscarriage.   No history of serositis.  No history of Raynaud's Phenomenon.  No known seizure disorder.  No known renal disorder.      Tobacco: none  EtOH: 4 drinks/weeks  Drugs: none  Occupation:     [employer]   12 point review of system was completed and negative except as noted in the HPI     Active Problem List     Patient Active Problem List   Diagnosis    Lumbar foraminal stenosis    Family history of colon cancer    Hypothyroidism due to acquired atrophy of thyroid    History of colonic polyps     Past Medical History     Past Medical History:   Diagnosis Date    Family history of colon cancer     due 2013 for     History of colonic polyps 6/6/2023    Hypothyroid     Hypothyroidism due to acquired atrophy of thyroid 10/15/2015    Lumbar foraminal stenosis 7/17/2012    Sciatic nerve disease      Past Surgical History     Past Surgical History:   Procedure Laterality Date    APPENDECTOMY  4/11/2011    Dr Lona Duncan    COLONOSCOPY  9-29-08    COLONOSCOPY N/A 8/9/2018    Procedure: COMBINED COLONOSCOPY, SINGLE OR MULTIPLE BIOPSY/POLYPECTOMY BY BIOPSY;;  Surgeon: Lc Cervantes MD;  Location: [facility]    COLONOSCOPY WITH CO2 INSUFFLATION  N/A 8/9/2018    Procedure: COLONOSCOPY WITH CO2 INSUFFLATION;  COLON SCREEN/ PATTIE;  Surgeon: Lc Cervantes MD;  Location: [facility]    ENDOSCOPY UPPER, COLONOSCOPY, COMBINED  7/1/2013    Procedure: COMBINED ENDOSCOPY UPPER, COLONOSCOPY;  COLONOSCOPY SCREEN-FAMILY HX OF COLON CANCER/ EGD-UPPER ENDOSCOPY-UPPER GI SYMPTOMS/ PATTIE;  Surgeon: Lc Cervantes MD;  Location: [facility]    ESOPHAGOSCOPY, GASTROSCOPY, DUODENOSCOPY (EGD), COMBINED  7/1/2013    Procedure: COMBINED ESOPHAGOSCOPY, GASTROSCOPY, DUODENOSCOPY (EGD), BIOPSY SINGLE OR MULTIPLE;;  Surgeon: Lc Cervantes MD;  Location: [facility]     Allergy     Allergies   Allergen Reactions    Pcn [Penicillins]      Current Medication List     Current Outpatient Medications   Medication Sig    diclofenac (VOLTAREN) 1 % topical gel Apply up to 2 grams of 1% gel to upper extremity and up to 4 grams of 1% gel to lower extremity up to 4 times daily as needed for joint pain    famotidine (PEPCID) 20 MG tablet Take 20 mg by mouth daily    Ibuprofen (ADVIL PO) PRN    levothyroxine (SYNTHROID/LEVOTHROID) 75 MCG tablet Take 1 tablet (75 mcg) by mouth daily    Multiple Vitamin (MULTIVITAMIN ADULT PO)     pilocarpine (SALAGEN) 5 MG tablet Pilocarpine 5mg daily x7days, then 5mg twice daily x7days, then 5mg three times daily thereafter.    Vitamin D (Cholecalciferol) 25 MCG (1000 UT) CAPS      No current facility-administered medications for this visit.     Social History   See HPI    Family History     Family History   Problem Relation Age of Onset    Breast Cancer Mother     Thyroid Disease Mother     Cancer - colorectal Father     Cancer Father         brain    Diabetes Maternal Grandmother     Cardiovascular Paternal Grandmother     Thyroid Disease Sister      Sister: sjogrens.   Uncle: RA.     Physical Exam     Temp Readings from Last 3 Encounters:   06/13/23 (!) 100.6  F (38.1  C) (Tympanic)   03/09/23 99.1  F (37.3  C) (Oral)   12/16/22 98.5  F  "(36.9  C) (Oral)     BP Readings from Last 5 Encounters:   08/22/23 132/78   06/13/23 (!) 147/90   03/09/23 (!) 147/58   12/16/22 (!) 160/90   11/22/21 (!) 154/88     Pulse Readings from Last 1 Encounters:   08/22/23 63     Resp Readings from Last 1 Encounters:   08/22/23 18     Estimated body mass index is 37.22 kg/m  as calculated from the following:    Height as of 3/9/23: 1.588 m (5' 2.5\").    Weight as of this encounter: 93.8 kg (206 lb 12.8 oz).      GEN: NAD. Healthy appearing adult.   HEENT: Dry mucous membranes.  Anicteric, noninjected sclera. No obvious external lesions of the ear and nose. Hearing intact.  CV: S1, S2. RRR. No m/r/g  PULM: No increased work of breathing. CTA bilaterally   MSK: MCPs, PIPs, DIPs without swelling or tenderness to palpation.  Heberden's nodes present.  Squaring and tenderness to palpation without overlying erythema or effusion of the bilateral first CMC joints.  Wrists without swelling or tenderness to palpation.  Elbows and shoulders without swelling or tenderness to palpation.  Knees with crepitation and medial joint line tenderness but no effusion or increased warmth.  Ankles and MTPs without swelling or tenderness to palpation.    SKIN: No rash or jaundice seen  PSYCH: Alert. Appropriate.      Labs / Imaging (select studies)     RF/CCP  Recent Labs   Lab Test 03/09/23  1246   CCPIGG 1.1   RHF <7     JUD  Recent Labs   Lab Test 03/09/23  1246   LEISA Positive*   ANAP1 Speckled   ANAT1 1:320     RNP/Sm/SSA/SSB  Recent Labs   Lab Test 03/09/23  1246   SSAIGG Negative   SSBIGG Negative     CBC  Recent Labs   Lab Test 12/16/22  0852 11/22/21  0804 10/16/20  1615 11/21/19  1613 06/12/18  1433   WBC 4.6 4.5 6.5   < > 5.9   RBC 4.83 4.67 4.13   < > 5.29*   HGB 12.8 12.2 11.8   < > 14.0   HCT 40.3 38.9 36.4   < > 42.7   MCV 83 83 88   < > 81   RDW 15.2* 16.5* 13.5   < > 19.1*    350 428   < > 348   MCH 26.5 26.1* 28.6   < > 26.5   MCHC 31.8 31.4* 32.4   < > 32.8   NEUTROPHIL  " --   --   --   --  53.1   LYMPH  --   --   --   --  32.4   MONOCYTE  --   --   --   --  7.9   EOSINOPHIL  --   --   --   --  5.4   BASOPHIL  --   --   --   --  1.2   ANEU  --   --   --   --  3.2   ALYM  --   --   --   --  1.9   JOSE JUAN  --   --   --   --  0.5   AEOS  --   --   --   --  0.3   ABAS  --   --   --   --  0.1    < > = values in this interval not displayed.     CMP  Recent Labs   Lab Test 12/16/22  0852 11/22/21  0804 10/16/20  1615 11/21/19  1613 11/08/18  1542 09/07/17  1622 10/15/15  0808    141 139 138  --  139 141   POTASSIUM 4.0 4.1 3.9 3.7  --  4.3 4.1   CHLORIDE 108 110* 104 106  --  105 107   CO2 29 27 26 29  --  23 25   ANIONGAP 4 4 9 3  --  11 9   GLC 96 94 86 84 80 93 94   BUN 13 12 9 11  --  10 8   CR 0.80 0.79 0.80 0.88  --  0.85 0.83   GFRESTIMATED 85 83 82 74  --  70 72   GFRESTBLACK  --   --  >90 86  --  85 88   MARIBEL 9.3 9.2 8.7 9.3  --  9.2 9.2     HgA1c  Recent Labs   Lab Test 12/16/22  0852   A1C 5.7*     Iron Studies  Recent Labs   Lab Test 11/22/21  0804 06/12/18  1433   LUIS MANUEL 8 8     Calcium/VitaminD  Recent Labs   Lab Test 12/16/22  0852 11/22/21  0804 10/16/20  1615   MARIBEL 9.3 9.2 8.7     ESR/CRP  Recent Labs   Lab Test 12/16/22  0852 06/12/18  1433   SED  --  10   CRP <2.9 <2.9     TSH/T4  Recent Labs   Lab Test 12/16/22  0852 11/22/21  0804 10/16/20  1615 11/21/19  1613   TSH 3.77 4.23* 2.20 4.36*   T4  --  1.04  --  1.07     Lipid Panel  Recent Labs   Lab Test 12/16/22  0852 11/08/18  1542 10/15/15  0808   CHOL 185 161 135   TRIG 63 130 79   HDL 62 52 46*   * 83 73   VLDL  --   --  16   CHOLHDLRATIO  --   --  2.9   NHDL 123 109  --        Hepatitis C  Recent Labs   Lab Test 09/02/16  1006   HCVAB Nonreactive   Assay performance characteristics have not been established for newborns,   infants, and children       Lyme ab screening  Recent Labs   Lab Test 06/12/18  1433   LYMEGM 0.14     HIV Screening  Recent Labs   Lab Test 06/12/18  1433   HIAGAB Nonreactive      Immunization History     Immunization History   Administered Date(s) Administered    COVID-19 Bivalent 18+ (Moderna) 11/26/2022    COVID-19 Monovalent 18+ (Moderna) 02/27/2021, 03/27/2021, 11/05/2021, 05/25/2022    DT (PEDS <7y) 04/01/1999    FLU 6-35 months 10/20/2010, 12/01/2011    Flu, Unspecified 10/13/2022    Influenza (IIV3) PF 10/20/2010, 12/01/2011, 10/01/2012, 10/27/2018, 10/12/2019, 09/18/2020    Influenza Vaccine >6 months (Alfuria,Fluzone) 09/26/2013, 10/27/2014, 10/15/2015, 09/02/2016, 10/27/2018, 09/25/2021, 10/13/2022    TD,PF 7+ (Tenivac) 08/01/2010    TDAP Vaccine (Adacel) 11/21/2019    Zoster recombinant adjuvanted (SHINGRIX) 11/21/2019, 02/17/2020          Chart documentation done in part with Dragon Voice recognition Software. Although reviewed after completion, some word and grammatical error may remain.    Aron Cee MD

## 2023-08-22 NOTE — NURSING NOTE
RAPID3 (0-30) Cumulative Score  5.5          RAPID3 Weighted Score (divide #4 by 3 and that is the weighted score)  1.8

## 2023-08-22 NOTE — PATIENT INSTRUCTIONS
RHEUMATOLOGY    [facility]  [address] [place] [address]  [place] [address]    Phone number: [phone]  Fax number: [phone]    If you need a medication refill, please contact us as you may need lab work and/or a follow up visit prior to your refill.      Thank you for choosing [facility]!    Connie Elam CMA Rheumatology

## 2023-08-25 ENCOUNTER — OFFICE VISIT (OUTPATIENT)
Dept: VASCULAR SURGERY | Facility: CLINIC | Age: 58
End: 2023-08-25
Attending: PODIATRIST
Payer: COMMERCIAL

## 2023-08-25 VITALS
DIASTOLIC BLOOD PRESSURE: 89 MMHG | OXYGEN SATURATION: 99 % | BODY MASS INDEX: 36.5 KG/M2 | RESPIRATION RATE: 16 BRPM | HEIGHT: 63 IN | HEART RATE: 83 BPM | WEIGHT: 206 LBS | SYSTOLIC BLOOD PRESSURE: 142 MMHG

## 2023-08-25 DIAGNOSIS — L74.4 ANHIDROSIS: Primary | ICD-10-CM

## 2023-08-25 PROCEDURE — 99203 OFFICE O/P NEW LOW 30 MIN: CPT | Performed by: PODIATRIST

## 2023-08-25 PROCEDURE — G0463 HOSPITAL OUTPT CLINIC VISIT: HCPCS | Performed by: PODIATRIST

## 2023-08-25 RX ORDER — KETOCONAZOLE 20 MG/ML
SHAMPOO TOPICAL
COMMUNITY
Start: 2022-11-02

## 2023-08-25 RX ORDER — CLINDAMYCIN PHOSPHATE 10 UG/ML
LOTION TOPICAL
COMMUNITY
Start: 2022-11-02 | End: 2024-01-23

## 2023-08-25 RX ORDER — BETAMETHASONE DIPROPIONATE 0.5 MG/G
LOTION TOPICAL
COMMUNITY
Start: 2022-11-02 | End: 2023-09-27

## 2023-08-25 RX ORDER — AMMONIUM LACTATE 12 G/100G
LOTION TOPICAL 2 TIMES DAILY
Qty: 396 G | Refills: 3 | Status: SHIPPED | OUTPATIENT
Start: 2023-08-25 | End: 2024-01-23

## 2023-08-25 ASSESSMENT — PAIN SCALES - GENERAL: PAINLEVEL: NO PAIN (0)

## 2023-08-25 NOTE — PROGRESS NOTES
FOOT AND ANKLE SURGERY/PODIATRY CONSULT NOTE        ASSESSMENT:   Anhidrosis  Fissure right heel      TREATMENT:  -Discussed with the patient that on exam today there are no open lesions.  He does have a superficial fissure along the medial right heel with dry flaky skin.    -I recommend we will start her on Lac-Hydrin, to be applied twice daily.      -I recommend she avoid soaking her feet as this can dry the skin out further.    -All questions invited and answered.  Patient to follow-up with me with any problems questions or concerns they develop.    Zeferino Sanchez DPM  [facility]      HPI: Ave Willoughby was seen today for a wound on her right heel.  Patient reports that she noticed a open sore along the medial right heel in July of this year and has been applying Shea butter moisturizing lotion which appears to have improved.  She is leaving for a  vacation and would like to have this evaluated before her trip.  She has also tried to use a callus removing device and soaking her feet to soften the area.    Past Medical History:   Diagnosis Date    Family history of colon cancer     due 2013 for     History of colonic polyps 6/6/2023    Hypothyroid     Hypothyroidism due to acquired atrophy of thyroid 10/15/2015    Lumbar foraminal stenosis 7/17/2012    Sciatic nerve disease        Past Surgical History:   Procedure Laterality Date    APPENDECTOMY  4/11/2011    Dr Lona Duncan    COLONOSCOPY  9-29-08    COLONOSCOPY N/A 8/9/2018    Procedure: COMBINED COLONOSCOPY, SINGLE OR MULTIPLE BIOPSY/POLYPECTOMY BY BIOPSY;;  Surgeon: Lc Cervantes MD;  Location: [facility]    COLONOSCOPY WITH CO2 INSUFFLATION N/A 8/9/2018    Procedure: COLONOSCOPY WITH CO2 INSUFFLATION;  COLON SCREEN/ PATTIE;  Surgeon: Lc Cervantes MD;  Location: [facility]    ENDOSCOPY UPPER, COLONOSCOPY, COMBINED  7/1/2013    Procedure: COMBINED ENDOSCOPY UPPER, COLONOSCOPY;  COLONOSCOPY SCREEN-FAMILY  HX OF COLON CANCER/ EGD-UPPER ENDOSCOPY-UPPER GI SYMPTOMS/ PATTIE;  Surgeon: Lc Cervantes MD;  Location: [facility]    ESOPHAGOSCOPY, GASTROSCOPY, DUODENOSCOPY (EGD), COMBINED  7/1/2013    Procedure: COMBINED ESOPHAGOSCOPY, GASTROSCOPY, DUODENOSCOPY (EGD), BIOPSY SINGLE OR MULTIPLE;;  Surgeon: Lc Cervantes MD;  Location: [facility]        Allergies   Allergen Reactions    Pcn [Penicillins]          Current Outpatient Medications:     ammonium lactate (LAC-HYDRIN) 12 % external lotion, Apply topically 2 times daily, Disp: 396 g, Rfl: 3    betamethasone dipropionate (DIPROSONE) 0.05 % external lotion, APPLY A THIN LAYER TO AFFECTED AREA ON SCALP 1-2 TIMES FOR UP TO 2 WEEKS. TAKE 2 WEEK BREAK. REPEAT AS NEEDED FOR FLARES., Disp: , Rfl:     clindamycin (CLEOCIN T) 1 % external lotion, APPLY TO AFFECTED AREA ONCE DAILY, ONGOING 0., Disp: , Rfl:     diclofenac (VOLTAREN) 1 % topical gel, Apply up to 2 grams of 1% gel to upper extremity and up to 4 grams of 1% gel to lower extremity up to 4 times daily as needed for joint pain, Disp: 400 g, Rfl: 2    famotidine (PEPCID) 20 MG tablet, Take 20 mg by mouth daily, Disp: , Rfl:     Ibuprofen (ADVIL PO), PRN, Disp: , Rfl:     ketoconazole (NIZORAL) 2 % external shampoo, WASH TO AFFECTED AREA 2-3X WEEKLY IN THE SHOWER. LATHER AND LET SIT 1 TO 2 MINUTES PRIOR TO RINSING., Disp: , Rfl:     levothyroxine (SYNTHROID/LEVOTHROID) 75 MCG tablet, Take 1 tablet (75 mcg) by mouth daily, Disp: 90 tablet, Rfl: 1    Multiple Vitamin (MULTIVITAMIN ADULT PO), , Disp: , Rfl:     pilocarpine (SALAGEN) 5 MG tablet, Pilocarpine 5mg daily x7days, then 5mg twice daily x7days, then 5mg three times daily thereafter., Disp: 90 tablet, Rfl: 3    Vitamin D (Cholecalciferol) 25 MCG (1000 UT) CAPS, , Disp: , Rfl:     Social History     Social History Narrative    Lives with wife and an adopted son (born 1998)        Retired 2022 (age 57)   ...  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       Family History   Problem Relation Age of Onset    Breast Cancer Mother     Thyroid Disease Mother     Cancer - colorectal Father     Cancer Father         brain    Diabetes Maternal Grandmother     Cardiovascular Paternal Grandmother     Thyroid Disease Sister        Review of Systems - 10 point Review of Systems is negative except for dry skin right heel which is noted in HPI.      OBJECTIVE:  Appearance: alert, well appearing, and in no distress.    BP (!) 142/89   Pulse 83   Resp 16   Ht 5' 2.5\" (1.588 m)   Wt 206 lb (93.4 kg)   LMP 06/24/2012   SpO2 99%   BMI 37.08 kg/m      BMI= Body mass index is 37.08 kg/m .    General appearance: Patient is alert and fully cooperative with history & exam.  No sign of distress is noted during the visit.     Psychiatric: Affect is pleasant & appropriate.  Patient appears motivated to improve health.     Respiratory: Breathing is regular & unlabored while sitting.     HEENT: Hearing is intact to spoken word.  Speech is clear.  No gross evidence of visual impairment that would impact ambulation.    Vascular: Dorsalis pedis palpableBilateral.  Dermatologic:   VASC Wound right heel (Active)   Description callous 08/25/23 0800   Superficial fissure medial right heel with dry flaky skin, no erythema or open lesions noted.  Neurologic: Intact to light touch Bilateral.  Musculoskeletal: Contracted digits noted Bilateral.      "

## 2023-09-27 ENCOUNTER — OFFICE VISIT (OUTPATIENT)
Dept: FAMILY MEDICINE | Facility: CLINIC | Age: 58
End: 2023-09-27
Payer: COMMERCIAL

## 2023-09-27 VITALS
OXYGEN SATURATION: 99 % | WEIGHT: 204.2 LBS | HEART RATE: 67 BPM | RESPIRATION RATE: 16 BRPM | DIASTOLIC BLOOD PRESSURE: 74 MMHG | TEMPERATURE: 97.2 F | BODY MASS INDEX: 36.18 KG/M2 | HEIGHT: 63 IN | SYSTOLIC BLOOD PRESSURE: 118 MMHG

## 2023-09-27 DIAGNOSIS — R53.83 FATIGUE, UNSPECIFIED TYPE: Primary | ICD-10-CM

## 2023-09-27 PROCEDURE — 96372 THER/PROPH/DIAG INJ SC/IM: CPT | Performed by: STUDENT IN AN ORGANIZED HEALTH CARE EDUCATION/TRAINING PROGRAM

## 2023-09-27 PROCEDURE — 90682 RIV4 VACC RECOMBINANT DNA IM: CPT | Performed by: STUDENT IN AN ORGANIZED HEALTH CARE EDUCATION/TRAINING PROGRAM

## 2023-09-27 PROCEDURE — 90471 IMMUNIZATION ADMIN: CPT | Performed by: STUDENT IN AN ORGANIZED HEALTH CARE EDUCATION/TRAINING PROGRAM

## 2023-09-27 PROCEDURE — 99213 OFFICE O/P EST LOW 20 MIN: CPT | Mod: 25 | Performed by: STUDENT IN AN ORGANIZED HEALTH CARE EDUCATION/TRAINING PROGRAM

## 2023-09-27 RX ORDER — CYANOCOBALAMIN 1000 UG/ML
1000 INJECTION, SOLUTION INTRAMUSCULAR; SUBCUTANEOUS
Status: ACTIVE | OUTPATIENT
Start: 2023-09-27 | End: 2024-09-21

## 2023-09-27 RX ADMIN — CYANOCOBALAMIN 1000 MCG: 1000 INJECTION, SOLUTION INTRAMUSCULAR; SUBCUTANEOUS at 09:34

## 2023-09-27 NOTE — NURSING NOTE
Clinic Administered Medication Documentation        Patient was given B12. Prior to medication administration, verified patient's identity using patient s name and date of birth. Please see MAR and medication order for additional information. Patient instructed to remain in clinic for 15 minutes and report any adverse reaction to staff immediately.    Vial/Syringe: Single dose vial. Was entire vial of medication used? Yes    Modesta Krishnamurthy MA

## 2023-09-27 NOTE — PROGRESS NOTES
"  Assessment & Plan     (R53.83) Fatigue, unspecified type  (primary encounter diagnosis)  Comment: Chronic, uncontrolled. Discussed the importance of adequate vitamin supplementation and encouraged increasing vitamin d levels to 50,000 units daily along with starting a vitamin b 12 supplement  Plan: cyanocobalamin injection 1,000 mcg        Pt received injection today. Will check labs during annual physical     Dictation Disclaimer: Some of this Note has been completed with voice-recognition dictation software. Although errors are generally corrected real-time, there is the potential for a rare error to be present in the completed chart.              BMI:   Estimated body mass index is 36.75 kg/m  as calculated from the following:    Height as of this encounter: 1.588 m (5' 2.5\").    Weight as of this encounter: 92.6 kg (204 lb 3.2 oz).   Weight management plan: Discussed healthy diet and exercise guidelines        ANGELICA MACKEY MD  [facility] RUBEN White is a 58 year old, presenting for the following health issues:  Establish Care        9/27/2023     9:04 AM   Additional Questions   Roomed by Modesta       History of Present Illness       Reason for visit:  Establish care    She eats 4 or more servings of fruits and vegetables daily.She consumes 0 sweetened beverage(s) daily.She exercises with enough effort to increase her heart rate 30 to 60 minutes per day.  She exercises with enough effort to increase her heart rate 6 days per week.   She is taking medications regularly.     HISTORY OF PRESENT ILLNESS  The patient presents to the office to establish [facility].     She recently saw her rheumatologist, Dr. Aron Cee, who diagnosed her with Sjogren's syndrome. She is on medication 3 times a day for her saliva production, which seems to help.    She has been feeling more fatigued than usual. She is retired now and is getting good sleep. For the last 3 years, she has been set a goal " "to do a triathlon at the end of the summer, which keeps her motivated. This summer, she felt like she trained better and was 20 minutes slower than she was the previous 2 years. She felt fatigued all summer and had to push herself to exercise every day. She is on vitamin D and a women's gummy vitamin.    She has had chronic low hemoglobin for many years, but over the last few months, it has been lower. She knows iron can cause constipation. She is scheduled for a colonoscopy on 10/12/2023.                Review of Systems   CONSTITUTIONAL:POSITIVE  for fatigue  ENT/MOUTH: NEGATIVE for ear, mouth and throat problems  RESP: NEGATIVE for significant cough or SOB  CV: NEGATIVE for chest pain, palpitations or peripheral edema      Objective    /74   Pulse 67   Temp 97.2  F (36.2  C) (Temporal)   Resp 16   Ht 1.588 m (5' 2.5\")   Wt 92.6 kg (204 lb 3.2 oz)   LMP 06/24/2012   SpO2 99%   BMI 36.75 kg/m    Body mass index is 36.75 kg/m .  Physical Exam   GENERAL: healthy, alert and no distress  EYES: Eyes grossly normal to inspection, PERRL and conjunctivae and sclerae normal  Neck: No visible JVD or lymphadenopathy   RESP: symmetrical rise in chest   CV: No peripheral edema notable   MS: no gross musculoskeletal defects noted  SKIN: no suspicious lesions or rashes  PSYCH: mentation appears normal, affect normal/bright                        "

## 2023-09-27 NOTE — PATIENT INSTRUCTIONS
Vitamin b 12: target : 5000 mcg daily    Vitamin D: 50,000 units weekly- equal 5 tablets 5 days a week

## 2023-10-02 ENCOUNTER — TELEPHONE (OUTPATIENT)
Dept: GASTROENTEROLOGY | Facility: CLINIC | Age: 58
End: 2023-10-02

## 2023-10-02 NOTE — TELEPHONE ENCOUNTER
Pre visit planning completed.      Procedure details:    Patient scheduled for Colonoscopy  on 10.16.2023.     Arrival time: 0755. Procedure time 0840    Pre op exam needed? N/A    Facility location: [facility]; [address], [place] [address]    Sedation type: Conscious sedation     Indication for procedure:  Special screening for malignant neoplasms, colon       Chart review:     Electronic implanted devices? No    Diabetic? No    Diabetic medication HOLDING recommendations: (if applicable)  Oral diabetic medications: N/A  Diabetic injectables: N/A  Insulin: N/A      Medication review:    Anticoagulants? No    NSAIDS? Yes.  Ibuprofen (Advil, Motrin).  Holding interval of 1 day before procedure.    Other medication HOLDING recommendations:  N/A      Prep for procedure:     Bowel prep recommendation: Miralax prep without magnesium citrate   Due to:  standard bowel prep.    Prep instructions sent via [facility]       Delmy Rebolledo RN  Endoscopy Procedure Pre Assessment RN  [phone] option 4

## 2023-10-02 NOTE — TELEPHONE ENCOUNTER
Attempted to contact patient in order to complete pre assessment questions.     No answer. Left message to return call to [phone] option 4      Delmy Rebolledo RN  Endoscopy Procedure Pre Assessment RN

## 2023-10-03 NOTE — TELEPHONE ENCOUNTER
Pre assessment completed for upcoming procedure.   (Please see previous telephone encounter notes for complete details)    Patient  returned call.       Procedure details:    Arrival time and facility location reviewed.    Pre op exam needed? N/A    Designated  policy reviewed. Instructed to have someone stay 6 hours post procedure.     COVID policy reviewed.      Medication review:    Medications reviewed. Please see supporting documentation below. Holding recommendations discussed (if applicable).   NSAID medication(s): Ibuprofen (Advil, Motrin): HOLD 1 day before procedure.      Prep for procedure:     Procedure prep instructions reviewed.        Additional information needed?  N/A      Patient  verbalized understanding and had no questions or concerns at this time.      Delmy Rebolledo RN  Endoscopy Procedure Pre Assessment RN  [phone] option 4

## 2023-10-16 ENCOUNTER — HOSPITAL ENCOUNTER (OUTPATIENT)
Facility: AMBULATORY SURGERY CENTER | Age: 58
Discharge: HOME | End: 2023-10-16
Attending: INTERNAL MEDICINE | Admitting: INTERNAL MEDICINE
Payer: COMMERCIAL

## 2023-10-16 VITALS
OXYGEN SATURATION: 99 % | TEMPERATURE: 97.6 F | DIASTOLIC BLOOD PRESSURE: 74 MMHG | RESPIRATION RATE: 16 BRPM | SYSTOLIC BLOOD PRESSURE: 122 MMHG | HEART RATE: 54 BPM

## 2023-10-16 LAB — COLONOSCOPY: NORMAL

## 2023-10-16 PROCEDURE — 45378 DIAGNOSTIC COLONOSCOPY: CPT

## 2023-10-16 PROCEDURE — G8918 PT W/O PREOP ORDER IV AB PRO: HCPCS

## 2023-10-16 PROCEDURE — G8907 PT DOC NO EVENTS ON DISCHARG: HCPCS

## 2023-10-16 RX ORDER — NALOXONE HYDROCHLORIDE 0.4 MG/ML
0.2 INJECTION, SOLUTION INTRAMUSCULAR; INTRAVENOUS; SUBCUTANEOUS
Status: DISCONTINUED | OUTPATIENT
Start: 2023-10-16 | End: 2023-10-17 | Stop reason: HOSPADM

## 2023-10-16 RX ORDER — ONDANSETRON 4 MG/1
4 TABLET, ORALLY DISINTEGRATING ORAL EVERY 6 HOURS PRN
Status: DISCONTINUED | OUTPATIENT
Start: 2023-10-16 | End: 2023-10-17 | Stop reason: HOSPADM

## 2023-10-16 RX ORDER — ONDANSETRON 2 MG/ML
4 INJECTION INTRAMUSCULAR; INTRAVENOUS EVERY 6 HOURS PRN
Status: DISCONTINUED | OUTPATIENT
Start: 2023-10-16 | End: 2023-10-17 | Stop reason: HOSPADM

## 2023-10-16 RX ORDER — FENTANYL CITRATE 50 UG/ML
INJECTION, SOLUTION INTRAMUSCULAR; INTRAVENOUS PRN
Status: DISCONTINUED | OUTPATIENT
Start: 2023-10-16 | End: 2023-10-16 | Stop reason: HOSPADM

## 2023-10-16 RX ORDER — PROCHLORPERAZINE MALEATE 10 MG
10 TABLET ORAL EVERY 6 HOURS PRN
Status: DISCONTINUED | OUTPATIENT
Start: 2023-10-16 | End: 2023-10-17 | Stop reason: HOSPADM

## 2023-10-16 RX ORDER — NALOXONE HYDROCHLORIDE 0.4 MG/ML
0.4 INJECTION, SOLUTION INTRAMUSCULAR; INTRAVENOUS; SUBCUTANEOUS
Status: DISCONTINUED | OUTPATIENT
Start: 2023-10-16 | End: 2023-10-17 | Stop reason: HOSPADM

## 2023-10-16 RX ORDER — ONDANSETRON 2 MG/ML
4 INJECTION INTRAMUSCULAR; INTRAVENOUS
Status: DISCONTINUED | OUTPATIENT
Start: 2023-10-16 | End: 2023-10-17 | Stop reason: HOSPADM

## 2023-10-16 RX ORDER — LIDOCAINE 40 MG/G
CREAM TOPICAL
Status: DISCONTINUED | OUTPATIENT
Start: 2023-10-16 | End: 2023-10-17 | Stop reason: HOSPADM

## 2023-10-16 RX ORDER — FLUMAZENIL 0.1 MG/ML
0.2 INJECTION, SOLUTION INTRAVENOUS
Status: ACTIVE | OUTPATIENT
Start: 2023-10-16 | End: 2023-10-16

## 2023-10-16 NOTE — H&P
ENDOSCOPY PRE-SEDATION H&P FOR OUTPATIENT PROCEDURES    [address] [place]  [id number]  1965    Procedure: colonoscopy    Pre-procedure diagnosis: fam hx CRC, personal hx polyps    Past medical history:   Past Medical History:   Diagnosis Date    Family history of colon cancer     due 2013 for     History of colonic polyps 6/6/2023    Hypothyroid     Hypothyroidism due to acquired atrophy of thyroid 10/15/2015    Lumbar foraminal stenosis 7/17/2012    Sciatic nerve disease        Past surgical history:   Past Surgical History:   Procedure Laterality Date    APPENDECTOMY  4/11/2011    Dr Lona Duncan    COLONOSCOPY  9-29-08    COLONOSCOPY N/A 8/9/2018    Procedure: COMBINED COLONOSCOPY, SINGLE OR MULTIPLE BIOPSY/POLYPECTOMY BY BIOPSY;;  Surgeon: Lc Cervantes MD;  Location: [facility]    COLONOSCOPY WITH CO2 INSUFFLATION N/A 8/9/2018    Procedure: COLONOSCOPY WITH CO2 INSUFFLATION;  COLON SCREEN/ PATTIE;  Surgeon: Lc Cervantes MD;  Location: [facility]    ENDOSCOPY UPPER, COLONOSCOPY, COMBINED  7/1/2013    Procedure: COMBINED ENDOSCOPY UPPER, COLONOSCOPY;  COLONOSCOPY SCREEN-FAMILY HX OF COLON CANCER/ EGD-UPPER ENDOSCOPY-UPPER GI SYMPTOMS/ PATTIE;  Surgeon: Lc Cervantes MD;  Location: [facility]    ESOPHAGOSCOPY, GASTROSCOPY, DUODENOSCOPY (EGD), COMBINED  7/1/2013    Procedure: COMBINED ESOPHAGOSCOPY, GASTROSCOPY, DUODENOSCOPY (EGD), BIOPSY SINGLE OR MULTIPLE;;  Surgeon: Lc Cervantes MD;  Location: [facility]       Current Outpatient Medications   Medication    ammonium lactate (LAC-HYDRIN) 12 % external lotion    clindamycin (CLEOCIN T) 1 % external lotion    diclofenac (VOLTAREN) 1 % topical gel    famotidine (PEPCID) 20 MG tablet    Ibuprofen (ADVIL PO)    ketoconazole (NIZORAL) 2 % external shampoo    levothyroxine (SYNTHROID/LEVOTHROID) 75 MCG tablet    Vitamin D (Cholecalciferol) 25 MCG (1000 UT) CAPS    Multiple Vitamin (MULTIVITAMIN ADULT PO)    pilocarpine (SALAGEN) 5 MG  tablet     Current Facility-Administered Medications   Medication    cyanocobalamin injection 1,000 mcg    fentaNYL (PF) (SUBLIMAZE) injection    midazolam (VERSED) injection    sodium chloride (PF) 0.9% PF flush       Allergies   Allergen Reactions    Pcn [Penicillins]        History of Anesthesia/Sedation Problems: no    PHYSICAL EXAMINATION:  Constitutional: aaox3, cooperative, pleasant  Vitals reviewed: /83   Pulse 61   Temp 97.6  F (36.4  C) (Temporal)   Resp 16   LMP 06/24/2012   SpO2 97%   Wt:   Wt Readings from Last 2 Encounters:   09/27/23 92.6 kg (204 lb 3.2 oz)   08/25/23 93.4 kg (206 lb)      Eyes: Sclera anicteric/injected  Ears/nose/mouth/throat: Normal oropharynx without ulcers or exudate, mucus membranes moist, hearing intact  Neck: supple, thyroid normal size  CV: No edema  Respiratory: Unlabored breathing  Lymph: No submandibular, supraclavicular or inguinal lymphadenopathy  Abd: Nondistended, no masses, nontender  Skin: warm, perfused, no jaundice  Psych: Normal affect  MSK: normal movement on limited exam.    ASA Score: See Provation note    Assessment/Plan:     The patient is an appropriate candidate to receive sedation.    Informed consent was discussed with the patient/family, including the risks, benefits, potential complications and any alternative options associated with sedation.    Patient assessment completed just prior to sedation and while under constant observation by the provider. Condition determined to be adequate for proceeding with sedation.    The specific risks for the procedure were discussed with the patient at the time of informed consent and include but are not limited to perforation which could require surgery, missing significant neoplasm or lesion, hemorrhage and adverse sedative complication.      Calvin Bar MD

## 2023-10-27 ENCOUNTER — OFFICE VISIT (OUTPATIENT)
Dept: PODIATRY | Facility: CLINIC | Age: 58
End: 2023-10-27
Payer: COMMERCIAL

## 2023-10-27 VITALS — HEART RATE: 74 BPM | SYSTOLIC BLOOD PRESSURE: 154 MMHG | DIASTOLIC BLOOD PRESSURE: 90 MMHG

## 2023-10-27 DIAGNOSIS — R21 RASH, SKIN: Primary | ICD-10-CM

## 2023-10-27 LAB
KOH PREPARATION: NORMAL
KOH PREPARATION: NORMAL

## 2023-10-27 PROCEDURE — 99213 OFFICE O/P EST LOW 20 MIN: CPT | Performed by: PODIATRIST

## 2023-10-27 PROCEDURE — 87220 TISSUE EXAM FOR FUNGI: CPT | Performed by: PODIATRIST

## 2023-10-27 NOTE — PROGRESS NOTES
Patient complains of a rash on left foot.  Points to medial ankle.  Started earlier this year.  She saw another physician for this.  Placed and a fungal cream on this for several weeks.  Only helped slightly.  Has also tried AmLactin and other lotions.  She is not getting much itching with this.  Patient states she has a history of eczema.  When she gets that she uses a steroid cream.    ROS:  see above         Allergies   Allergen Reactions    Pcn [Penicillins]        Current Outpatient Medications   Medication Sig Dispense Refill    ammonium lactate (LAC-HYDRIN) 12 % external lotion Apply topically 2 times daily 396 g 3    clindamycin (CLEOCIN T) 1 % external lotion APPLY TO AFFECTED AREA ONCE DAILY, ONGOING 0.      diclofenac (VOLTAREN) 1 % topical gel Apply up to 2 grams of 1% gel to upper extremity and up to 4 grams of 1% gel to lower extremity up to 4 times daily as needed for joint pain 400 g 2    famotidine (PEPCID) 20 MG tablet Take 20 mg by mouth daily      Ibuprofen (ADVIL PO) PRN      ketoconazole (NIZORAL) 2 % external shampoo WASH TO AFFECTED AREA 2-3X WEEKLY IN THE SHOWER. LATHER AND LET SIT 1 TO 2 MINUTES PRIOR TO RINSING.      levothyroxine (SYNTHROID/LEVOTHROID) 75 MCG tablet Take 1 tablet (75 mcg) by mouth daily 90 tablet 1    Multiple Vitamin (MULTIVITAMIN ADULT PO)       pilocarpine (SALAGEN) 5 MG tablet Pilocarpine 5mg daily x7days, then 5mg twice daily x7days, then 5mg three times daily thereafter. 90 tablet 3    Vitamin D (Cholecalciferol) 25 MCG (1000 UT) CAPS          Patient Active Problem List   Diagnosis    Lumbar foraminal stenosis    Family history of colon cancer    Hypothyroidism due to acquired atrophy of thyroid    History of colonic polyps    Anhidrosis       Past Medical History:   Diagnosis Date    Family history of colon cancer     due 2013 for     History of colonic polyps 6/6/2023    Hypothyroid     Hypothyroidism due to acquired atrophy of thyroid 10/15/2015    Lumbar  foraminal stenosis 7/17/2012    Sciatic nerve disease        Past Surgical History:   Procedure Laterality Date    APPENDECTOMY  4/11/2011    Dr Lona Duncan    COLONOSCOPY  9-29-08    COLONOSCOPY N/A 8/9/2018    Procedure: COMBINED COLONOSCOPY, SINGLE OR MULTIPLE BIOPSY/POLYPECTOMY BY BIOPSY;;  Surgeon: Lc Cervantes MD;  Location: [facility]    COLONOSCOPY WITH CO2 INSUFFLATION N/A 8/9/2018    Procedure: COLONOSCOPY WITH CO2 INSUFFLATION;  COLON SCREEN/ PATTIE;  Surgeon: Lc Cervantes MD;  Location: [facility]    COLONOSCOPY WITH CO2 INSUFFLATION N/A 10/16/2023    Procedure: Colonoscopy with CO2 insufflation;  Surgeon: Calvin Bar MD;  Location: [facility]    ENDOSCOPY UPPER, COLONOSCOPY, COMBINED  7/1/2013    Procedure: COMBINED ENDOSCOPY UPPER, COLONOSCOPY;  COLONOSCOPY SCREEN-FAMILY HX OF COLON CANCER/ EGD-UPPER ENDOSCOPY-UPPER GI SYMPTOMS/ PATTIE;  Surgeon: Lc Cervantes MD;  Location: [facility]    ESOPHAGOSCOPY, GASTROSCOPY, DUODENOSCOPY (EGD), COMBINED  7/1/2013    Procedure: COMBINED ESOPHAGOSCOPY, GASTROSCOPY, DUODENOSCOPY (EGD), BIOPSY SINGLE OR MULTIPLE;;  Surgeon: Lc Cervantes MD;  Location: [facility]       Family History   Problem Relation Age of Onset    Breast Cancer Mother     Thyroid Disease Mother     Cancer - colorectal Father     Cancer Father         brain    Diabetes Maternal Grandmother     Cardiovascular Paternal Grandmother     Thyroid Disease Sister        Social History     Tobacco Use    Smoking status: Never    Smokeless tobacco: Never   Substance Use Topics    Alcohol use: Yes     Comment: 5 drinks a week         Exam:    Vitals: BP (!) 154/90   Pulse 74   LMP 06/24/2012   BMI: There is no height or weight on file to calculate BMI.  Height: Data Unavailable    Constitutional/ general:  Pt is in no apparent distress, appears well-nourished.  Cooperative with history and physical exam.     Psych:  The patient answered questions appropriately.   Normal affect.  Seems to have reasonable expectations, in terms of treatment.     Lungs:  Non labored breathing, non labored speech. No cough.  No audible wheezing. Even, quiet breathing.       Vascular: Palpable pedal pulses.    Neuro:  Alert and oriented x 3. Coordinated gait.  Light touch sensation is intact     Musculoskeletal:    Lower extremity muscle strength is normal.  Patient is ambulatory without an assistive device or brace.   Normal arch with weightbearing.  No forefoot or rear foot deformities noted.  MS 5/5 all compartments.  Normal ROM all fore foot and rearfoot joints.  No equinus.     Derm: Normal texture and turgor.  No erythema, ecchymosis, or cyanosis.  Left heel medial skin is erythematous and peeling.  I see no vesicles at this time.    Status: Final result       Visible to patient: Yes (not seen)       Dx: Rash, skin    Specimen Information: Foot, Left; Skin   0 Result Notes  KOH Preparation No fungal elements seen      Reference Range: No fungal elements seen.              Resulting Agency: [facility]           Specimen Collected: 10/27/23  9:28 AM Last Resulted: 10/27/23  9:41 AM                 A/P left medial heel rash    JAM was taken of the skin in the left heel today.  Discussed it was negative.  This could be from her eczema.  We will start using steroid cream on this daily for 2 weeks or until resolved.  If not resolving recommend she see dermatologist for biopsy.  RTC as needed.    Quinton Robles DPM, FACFAS

## 2023-10-27 NOTE — LETTER
10/27/2023         RE: [address] [place]  [address] [facility] [address]        Dear Colleague,    Thank you for referring your patient, [address] [place], to the [facility]. Please see a copy of my visit note below.    Patient complains of a rash on left foot.  Points to medial ankle.  Started earlier this year.  She saw another physician for this.  Placed and a fungal cream on this for several weeks.  Only helped slightly.  Has also tried AmLactin and other lotions.  She is not getting much itching with this.  Patient states she has a history of eczema.  When she gets that she uses a steroid cream.    ROS:  see above         Allergies   Allergen Reactions     Pcn [Penicillins]        Current Outpatient Medications   Medication Sig Dispense Refill     ammonium lactate (LAC-HYDRIN) 12 % external lotion Apply topically 2 times daily 396 g 3     clindamycin (CLEOCIN T) 1 % external lotion APPLY TO AFFECTED AREA ONCE DAILY, ONGOING 0.       diclofenac (VOLTAREN) 1 % topical gel Apply up to 2 grams of 1% gel to upper extremity and up to 4 grams of 1% gel to lower extremity up to 4 times daily as needed for joint pain 400 g 2     famotidine (PEPCID) 20 MG tablet Take 20 mg by mouth daily       Ibuprofen (ADVIL PO) PRN       ketoconazole (NIZORAL) 2 % external shampoo WASH TO AFFECTED AREA 2-3X WEEKLY IN THE SHOWER. LATHER AND LET SIT 1 TO 2 MINUTES PRIOR TO RINSING.       levothyroxine (SYNTHROID/LEVOTHROID) 75 MCG tablet Take 1 tablet (75 mcg) by mouth daily 90 tablet 1     Multiple Vitamin (MULTIVITAMIN ADULT PO)        pilocarpine (SALAGEN) 5 MG tablet Pilocarpine 5mg daily x7days, then 5mg twice daily x7days, then 5mg three times daily thereafter. 90 tablet 3     Vitamin D (Cholecalciferol) 25 MCG (1000 UT) CAPS          Patient Active Problem List   Diagnosis     Lumbar foraminal stenosis     Family history of colon cancer     Hypothyroidism due to acquired atrophy of thyroid      History of colonic polyps     Anhidrosis       Past Medical History:   Diagnosis Date     Family history of colon cancer     due 2013 for      History of colonic polyps 6/6/2023     Hypothyroid      Hypothyroidism due to acquired atrophy of thyroid 10/15/2015     Lumbar foraminal stenosis 7/17/2012     Sciatic nerve disease        Past Surgical History:   Procedure Laterality Date     APPENDECTOMY  4/11/2011    Dr Lona Duncan     COLONOSCOPY  9-29-08     COLONOSCOPY N/A 8/9/2018    Procedure: COMBINED COLONOSCOPY, SINGLE OR MULTIPLE BIOPSY/POLYPECTOMY BY BIOPSY;;  Surgeon: Lc Cervantes MD;  Location: [facility]     COLONOSCOPY WITH CO2 INSUFFLATION N/A 8/9/2018    Procedure: COLONOSCOPY WITH CO2 INSUFFLATION;  COLON SCREEN/ PATTIE;  Surgeon: Lc Cervantes MD;  Location: [facility]     COLONOSCOPY WITH CO2 INSUFFLATION N/A 10/16/2023    Procedure: Colonoscopy with CO2 insufflation;  Surgeon: Calvin Bar MD;  Location: [facility]     ENDOSCOPY UPPER, COLONOSCOPY, COMBINED  7/1/2013    Procedure: COMBINED ENDOSCOPY UPPER, COLONOSCOPY;  COLONOSCOPY SCREEN-FAMILY HX OF COLON CANCER/ EGD-UPPER ENDOSCOPY-UPPER GI SYMPTOMS/ PATTIE;  Surgeon: Lc Cervantes MD;  Location: [facility]     ESOPHAGOSCOPY, GASTROSCOPY, DUODENOSCOPY (EGD), COMBINED  7/1/2013    Procedure: COMBINED ESOPHAGOSCOPY, GASTROSCOPY, DUODENOSCOPY (EGD), BIOPSY SINGLE OR MULTIPLE;;  Surgeon: Lc Cervantes MD;  Location: [facility]       Family History   Problem Relation Age of Onset     Breast Cancer Mother      Thyroid Disease Mother      Cancer - colorectal Father      Cancer Father         brain     Diabetes Maternal Grandmother      Cardiovascular Paternal Grandmother      Thyroid Disease Sister        Social History     Tobacco Use     Smoking status: Never     Smokeless tobacco: Never   Substance Use Topics     Alcohol use: Yes     Comment: 5 drinks a week         Exam:    Vitals: BP (!) 154/90   Pulse 74    LMP 06/24/2012   BMI: There is no height or weight on file to calculate BMI.  Height: Data Unavailable    Constitutional/ general:  Pt is in no apparent distress, appears well-nourished.  Cooperative with history and physical exam.     Psych:  The patient answered questions appropriately.  Normal affect.  Seems to have reasonable expectations, in terms of treatment.     Lungs:  Non labored breathing, non labored speech. No cough.  No audible wheezing. Even, quiet breathing.       Vascular: Palpable pedal pulses.    Neuro:  Alert and oriented x 3. Coordinated gait.  Light touch sensation is intact     Musculoskeletal:    Lower extremity muscle strength is normal.  Patient is ambulatory without an assistive device or brace.   Normal arch with weightbearing.  No forefoot or rear foot deformities noted.  MS 5/5 all compartments.  Normal ROM all fore foot and rearfoot joints.  No equinus.     Derm: Normal texture and turgor.  No erythema, ecchymosis, or cyanosis.  Left heel medial skin is erythematous and peeling.  I see no vesicles at this time.    Status: Final result       Visible to patient: Yes (not seen)       Dx: Rash, skin    Specimen Information: Foot, Left; Skin   0 Result Notes  KOH Preparation No fungal elements seen      Reference Range: No fungal elements seen.              Resulting Agency: [facility]           Specimen Collected: 10/27/23  9:28 AM Last Resulted: 10/27/23  9:41 AM                 A/P left medial heel rash    JAM was taken of the skin in the left heel today.  Discussed it was negative.  This could be from her eczema.  We will start using steroid cream on this daily for 2 weeks or until resolved.  If not resolving recommend she see dermatologist for biopsy.  RTC as needed.    Quinton Robles DPM, FACFAS        Again, thank you for allowing me to participate in the care of your patient.        Sincerely,        Quinton Robles DPM

## 2023-10-27 NOTE — PATIENT INSTRUCTIONS
We wish you continued good healing. If you have any questions or concerns, please do not hesitate to contact us at  [phone]    [employer] (secure e-mail communication and access to your chart) to send a message or to make an appointment.    Please remember to call and schedule a follow up appointment if one was recommended at your earliest convenience.     PODIATRY CLINIC HOURS  TELEPHONE NUMBER    Dr. Quinton CALDERONPJOSE FACFAS        Clinics:  [facility]   [place]  Tuesday 1PM-6PM  [place]  Wednesday 745AM-330PM  [place]  Monday 2nd,4th  830AM-4PM  Thursday/Friday 745AM-230PM     [place] APPOINTMENTS  [phone]    [place] APPOINTMENTS  [phone]          If you need a medication refill, please contact us you may need lab work and/or a follow up visit prior to your refill (i.e. Antifungal medications).  If MRI needed please call Imaging at [phone]   HOW DO I GET MY KNEE SCOOTER? Knee scooters can be picked up at ANY Medical Supply stores with your knee scooter Prescription.  OR  Bring your signed prescription to an [facility] showroom.   Set up an appointment for your custom Orthotics. Call any Orthotics locations call [phone]

## 2023-11-08 ENCOUNTER — PATIENT OUTREACH (OUTPATIENT)
Dept: GASTROENTEROLOGY | Facility: CLINIC | Age: 58
End: 2023-11-08

## 2023-11-29 ENCOUNTER — OFFICE VISIT (OUTPATIENT)
Dept: RHEUMATOLOGY | Facility: CLINIC | Age: 58
End: 2023-11-29
Payer: COMMERCIAL

## 2023-11-29 VITALS
RESPIRATION RATE: 16 BRPM | SYSTOLIC BLOOD PRESSURE: 126 MMHG | BODY MASS INDEX: 36.9 KG/M2 | HEART RATE: 78 BPM | WEIGHT: 205 LBS | DIASTOLIC BLOOD PRESSURE: 84 MMHG | OXYGEN SATURATION: 99 %

## 2023-11-29 DIAGNOSIS — R68.2 DRY MOUTH: ICD-10-CM

## 2023-11-29 DIAGNOSIS — M19.041 PRIMARY OSTEOARTHRITIS OF BOTH HANDS: Primary | ICD-10-CM

## 2023-11-29 DIAGNOSIS — M35.00 SICCA SYNDROME (H): ICD-10-CM

## 2023-11-29 DIAGNOSIS — M19.042 PRIMARY OSTEOARTHRITIS OF BOTH HANDS: Primary | ICD-10-CM

## 2023-11-29 PROCEDURE — 99214 OFFICE O/P EST MOD 30 MIN: CPT | Performed by: INTERNAL MEDICINE

## 2023-11-29 RX ORDER — PILOCARPINE HYDROCHLORIDE 5 MG/1
5 TABLET, FILM COATED ORAL 2 TIMES DAILY
Qty: 180 TABLET | Refills: 3 | Status: SHIPPED | OUTPATIENT
Start: 2023-11-29

## 2023-11-29 NOTE — PROGRESS NOTES
Rheumatology Clinic Visit      Ave Willoughby MRN# [id number]   YOB: 1965 Age: 58 year old      Date of visit: 11/29/23   PCP: Dr. Tayla Espinal    Chief Complaint   Patient presents with:  Elevated antinuclear antibody      Assessment and Plan     1.  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  Dry eyes are treated with artificial tears; uses contacts.  Advised that she try using preservative-free artificial tears, and to follow-up with her ophthalmologist to see about possibly getting contacts for dry eyes.  No dry mouth, she is already using Biotene products, sugar-free lozenges, sugar-free gum, avoidance of sugary/caffeinated/carbonated beverages, following with the dentist twice yearly and periodontist twice yearly for periodontal disease.  Significant dry mouth when first evaluated on 8/22/2023.  We reviewed the suspicion for Sjogren's syndrome, but the SSA and SSB were negative.  We discussed the option of minor salivary gland biopsy but this would not change the symptomatic treatment at this point, and therefore she would like to not proceed with the minor salivary gland biopsy.  Discussed using pilocarpine in August 2023 and she would prefer to proceed with it.  Pilocarpine 5 mg twice daily is very effective.  Dry mouth well-controlled with pilocarpine.  She will continue monitoring labs with primary care provider once yearly.  Plan to follow-up in the rheumatology clinic in 1 year.   - Continue pilocarpine 5 mg twice daily    2. Primary osteoarthritis of both knees: Has improved with Synvisc injections from orthopedics.  Topical Voltaren gel partially effective.  - diclofenac (VOLTAREN) 1 % topical gel; Apply up to 2 grams of 1% gel to upper extremity and up to 4 grams of 1% gel to lower extremity up to 4 times daily as needed for joint pain      3. Primary osteoarthritis of both hands: Evidenced by squaring of bilateral first CMC joints Heberden's nodes, Luis's  nodes.  Topical Voltaren gel effective.  Hand therapy referral given today.  - diclofenac (VOLTAREN) 1 % topical gel; Apply up to 2 grams of 1% gel to upper extremity and up to 4 grams of 1% gel to lower extremity up to 4 times daily as needed for joint pain  Dispense: 400 g; Refill: 2    4. Primary osteoarthritis of both feet: History of bunion surgery.  Currently with bony hypertrophy at the first MTPs.  Stiff soled shoes whenever ambulatory, indoors and outdoors, to prevent repetitive articulation at the MTPs.    Total minutes spent in evaluation with patient, documentation, , and review of pertinent studies and chart notes: 12    Ms. Willoughby verbalized agreement with and understanding of the rational for the diagnosis and treatment plan.  All questions were answered to best of my ability and the patient's satisfaction. Ms. Willoughby was advised to contact the clinic with any questions that may arise after the clinic visit.      Thank you for involving me in the care of the patient    Return to clinic: 1 year    HPI   Ave Willoughby is a 58 year old female with a past medical history significant for lumbar foraminal stenosis, hypothyroidism, and positive JUD who presents for  rheumatology follow-up.    8/22/2023: 3-5 years of worsening joint symptoms; feeling of swelling in the hands, knees, and feet; worse in the AM, better with activity.  Morning stiffness for about 2 hours.  Dry eyes/mouth/vaginal.  Wears contacts; uses artificial tears (one for when using contacts, and one for when contacts are not in).  Dry mouth tx'd with Biotene mouthwash at bedtime, occasional a biotene spray; sipping water several times per hour; sugar free gum sometimes; sugar free lozenges.  Seeing a dentist and periodontist each twice yearly. Canker sores 3-4 x/yr, occurring for as long as she can remember.; not always in the same location, sometimes painful; hx of cavities but none for the past few years.    Staying active;  doing a triathlon this weekend.     Had palpitations previously but none now.  Hypothyroidism on stable treatment for a while now.  Used to use meloxicam but no longer.    Has been seen at [facility] where Synvisc injection for each knee has been helpful    Today, 11/30/2023: Voltaren gel is helpful.  She would like to go to hand therapy for hand osteoarthritis.  Pilocarpine 5 mg twice daily is very effective for dry mouth; no longer having significant dry mouth.  Pilocarpine 3 times daily was too much -hot sweating.    Denies fevers, chills, nausea, vomiting, constipation, diarrhea. No abdominal pain.  No black or bloody stools.  No chest pain/pressure, palpitations, or shortness of breath at this time. No LE swelling.  No rash.    Tobacco: none  EtOH: 4 drinks/weeks  Drugs: none  Occupation:     ROS   12 point review of system was completed and negative except as noted in the HPI     Active Problem List     Patient Active Problem List   Diagnosis    Lumbar foraminal stenosis    Family history of colon cancer    Hypothyroidism due to acquired atrophy of thyroid    History of colonic polyps    Anhidrosis     Past Medical History     Past Medical History:   Diagnosis Date    Family history of colon cancer     due 2013 for     History of colonic polyps 6/6/2023    Hypothyroid     Hypothyroidism due to acquired atrophy of thyroid 10/15/2015    Lumbar foraminal stenosis 7/17/2012    Sciatic nerve disease      Past Surgical History     Past Surgical History:   Procedure Laterality Date    APPENDECTOMY  4/11/2011    Dr Lona Duncan    COLONOSCOPY  9-29-08    COLONOSCOPY N/A 8/9/2018    Procedure: COMBINED COLONOSCOPY, SINGLE OR MULTIPLE BIOPSY/POLYPECTOMY BY BIOPSY;;  Surgeon: Lc Cervantes MD;  Location: [facility]    COLONOSCOPY WITH CO2 INSUFFLATION N/A 8/9/2018    Procedure: COLONOSCOPY WITH CO2 INSUFFLATION;  COLON SCREEN/ PATTIE;  Surgeon: Lc Cervantes MD;   Location: [facility]    COLONOSCOPY WITH CO2 INSUFFLATION N/A 10/16/2023    Procedure: Colonoscopy with CO2 insufflation;  Surgeon: Calvin Bar MD;  Location: [facility]    ENDOSCOPY UPPER, COLONOSCOPY, COMBINED  7/1/2013    Procedure: COMBINED ENDOSCOPY UPPER, COLONOSCOPY;  COLONOSCOPY SCREEN-FAMILY HX OF COLON CANCER/ EGD-UPPER ENDOSCOPY-UPPER GI SYMPTOMS/ PATTIE;  Surgeon: Lc Cervantes MD;  Location: [facility]    ESOPHAGOSCOPY, GASTROSCOPY, DUODENOSCOPY (EGD), COMBINED  7/1/2013    Procedure: COMBINED ESOPHAGOSCOPY, GASTROSCOPY, DUODENOSCOPY (EGD), BIOPSY SINGLE OR MULTIPLE;;  Surgeon: Lc Cervantes MD;  Location: [facility] OR     Allergy     Allergies   Allergen Reactions    Pcn [Penicillins]      Current Medication List     Current Outpatient Medications   Medication Sig    ammonium lactate (LAC-HYDRIN) 12 % external lotion Apply topically 2 times daily    clindamycin (CLEOCIN T) 1 % external lotion APPLY TO AFFECTED AREA ONCE DAILY, ONGOING 0.    diclofenac (VOLTAREN) 1 % topical gel Apply up to 2 grams of 1% gel to upper extremity and up to 4 grams of 1% gel to lower extremity up to 4 times daily as needed for joint pain    famotidine (PEPCID) 20 MG tablet Take 20 mg by mouth daily    Ibuprofen (ADVIL PO) PRN    ketoconazole (NIZORAL) 2 % external shampoo WASH TO AFFECTED AREA 2-3X WEEKLY IN THE SHOWER. LATHER AND LET SIT 1 TO 2 MINUTES PRIOR TO RINSING.    levothyroxine (SYNTHROID/LEVOTHROID) 75 MCG tablet Take 1 tablet (75 mcg) by mouth daily    Multiple Vitamin (MULTIVITAMIN ADULT PO)     pilocarpine (SALAGEN) 5 MG tablet Pilocarpine 5mg daily x7days, then 5mg twice daily x7days, then 5mg three times daily thereafter.    Vitamin D (Cholecalciferol) 25 MCG (1000 UT) CAPS      Current Facility-Administered Medications   Medication    cyanocobalamin injection 1,000 mcg     Social History   See HPI    Family History     Family History   Problem Relation Age of Onset    Breast Cancer Mother   "   Thyroid Disease Mother     Cancer - colorectal Father     Cancer Father         brain    Diabetes Maternal Grandmother     Cardiovascular Paternal Grandmother     Thyroid Disease Sister      Sister: sjogrens.   Uncle: RA.     Physical Exam     Temp Readings from Last 3 Encounters:   10/16/23 97.6  F (36.4  C) (Temporal)   09/27/23 97.2  F (36.2  C) (Temporal)   06/13/23 (!) 100.6  F (38.1  C) (Tympanic)     BP Readings from Last 5 Encounters:   10/27/23 (!) 154/90   10/16/23 122/74   09/27/23 118/74   08/25/23 (!) 142/89   08/22/23 132/78     Pulse Readings from Last 1 Encounters:   11/29/23 78     Resp Readings from Last 1 Encounters:   11/29/23 16     Estimated body mass index is 36.9 kg/m  as calculated from the following:    Height as of 9/27/23: 1.588 m (5' 2.5\").    Weight as of this encounter: 93 kg (205 lb).      GEN: NAD. Healthy appearing adult.   HEENT: Moist mucous membranes.  Anicteric, noninjected sclera. No obvious external lesions of the ear and nose. Hearing intact.  PULM: No increased work of breathing.   MSK: Hands and wrist without swelling or tenderness to palpation.  SKIN: No rash or jaundice seen  PSYCH: Alert. Appropriate.      Labs / Imaging (select studies)   RF/CCP  Recent Labs   Lab Test 03/09/23  1246   CCPIGG 1.1   RHF <7     JUD  Recent Labs   Lab Test 03/09/23  1246   LEISA Positive*   ANAP1 Speckled   ANAT1 1:320     RNP/Sm/SSA/SSB  Recent Labs   Lab Test 03/09/23  1246   SSAIGG Negative   SSBIGG Negative     CBC  Recent Labs   Lab Test 12/16/22  0852 11/22/21  0804 10/16/20  1615 11/21/19  1613 06/12/18  1433   WBC 4.6 4.5 6.5   < > 5.9   RBC 4.83 4.67 4.13   < > 5.29*   HGB 12.8 12.2 11.8   < > 14.0   HCT 40.3 38.9 36.4   < > 42.7   MCV 83 83 88   < > 81   RDW 15.2* 16.5* 13.5   < > 19.1*    350 428   < > 348   MCH 26.5 26.1* 28.6   < > 26.5   MCHC 31.8 31.4* 32.4   < > 32.8   NEUTROPHIL  --   --   --   --  53.1   LYMPH  --   --   --   --  32.4   MONOCYTE  --   --   --   " --  7.9   EOSINOPHIL  --   --   --   --  5.4   BASOPHIL  --   --   --   --  1.2   ANEU  --   --   --   --  3.2   ALYM  --   --   --   --  1.9   JOSE JUAN  --   --   --   --  0.5   AEOS  --   --   --   --  0.3   ABAS  --   --   --   --  0.1    < > = values in this interval not displayed.     CMP  Recent Labs   Lab Test 12/16/22  0852 11/22/21  0804 10/16/20  1615 11/21/19  1613 11/08/18  1542 09/07/17  1622 10/15/15  0808    141 139 138  --  139 141   POTASSIUM 4.0 4.1 3.9 3.7  --  4.3 4.1   CHLORIDE 108 110* 104 106  --  105 107   CO2 29 27 26 29  --  23 25   ANIONGAP 4 4 9 3  --  11 9   GLC 96 94 86 84 80 93 94   BUN 13 12 9 11  --  10 8   CR 0.80 0.79 0.80 0.88  --  0.85 0.83   GFRESTIMATED 85 83 82 74  --  70 72   GFRESTBLACK  --   --  >90 86  --  85 88   MARIBEL 9.3 9.2 8.7 9.3  --  9.2 9.2       Iron Studies  Recent Labs   Lab Test 11/22/21  0804 06/12/18  1433   LUIS MANUEL 8 8     Calcium/VitaminD  Recent Labs   Lab Test 12/16/22  0852 11/22/21  0804 10/16/20  1615   MARIBEL 9.3 9.2 8.7     ESR/CRP  Recent Labs   Lab Test 12/16/22  0852 06/12/18  1433   SED  --  10   CRP <2.9 <2.9     TSH/T4  Recent Labs   Lab Test 12/16/22  0852 11/22/21  0804 10/16/20  1615 11/21/19  1613   TSH 3.77 4.23* 2.20 4.36*   T4  --  1.04  --  1.07       Hepatitis C  Recent Labs   Lab Test 09/02/16  1006   HCVAB Nonreactive   Assay performance characteristics have not been established for newborns,   infants, and children       Lyme ab screening  Recent Labs   Lab Test 06/12/18  1433   LYMEGM 0.14           Immunization History     Immunization History   Administered Date(s) Administered    COVID-19 Bivalent 18+ (Moderna) 11/26/2022    COVID-19 Monovalent 18+ (Moderna) 02/27/2021, 03/27/2021, 11/05/2021, 05/25/2022    DT (PEDS <7y) 04/01/1999    Flu, Unspecified 10/13/2022    Influenza (IIV3) PF 10/20/2010, 12/01/2011, 10/01/2012, 10/27/2018, 10/12/2019, 09/18/2020    Influenza Vaccine 18-64 (Flublok) 09/27/2023    Influenza Vaccine >6  months,quad, PF 09/26/2013, 10/27/2014, 10/15/2015, 09/02/2016, 10/27/2018, 09/25/2021, 10/13/2022    Influenza, seasonal, injectable, PF 10/20/2010, 12/01/2011    TD,PF 7+ (Tenivac) 08/01/2010    TDAP Vaccine (Adacel) 11/21/2019    Zoster recombinant adjuvanted (SHINGRIX) 11/21/2019, 02/17/2020          Chart documentation done in part with Dragon Voice recognition Software. Although reviewed after completion, some word and grammatical error may remain.    Aron Cee MD

## 2023-11-29 NOTE — PATIENT INSTRUCTIONS
RHEUMATOLOGY    [facility]  [address] [place] [address]  [place] [address]    Phone number: [phone]  Fax number: [phone]    If you need a medication refill, please contact us as you may need lab work and/or a follow up visit prior to your refill.      Thank you for choosing [facility]!    Connie Elam CMA Rheumatology

## 2023-12-14 ASSESSMENT — ENCOUNTER SYMPTOMS
HEADACHES: 0
CHILLS: 0
SHORTNESS OF BREATH: 0
ABDOMINAL PAIN: 0
NERVOUS/ANXIOUS: 0
EYE PAIN: 0
PARESTHESIAS: 0
COUGH: 0
DIZZINESS: 0
FEVER: 0
JOINT SWELLING: 1
HEMATURIA: 0
DYSURIA: 0
HEARTBURN: 0
HEMATOCHEZIA: 0
SORE THROAT: 0
DIARRHEA: 0
NAUSEA: 0
MYALGIAS: 0
BREAST MASS: 0
PALPITATIONS: 0
CONSTIPATION: 0
FREQUENCY: 0
ARTHRALGIAS: 1

## 2023-12-21 ENCOUNTER — OFFICE VISIT (OUTPATIENT)
Dept: FAMILY MEDICINE | Facility: CLINIC | Age: 58
End: 2023-12-21
Payer: COMMERCIAL

## 2023-12-21 ENCOUNTER — ANCILLARY PROCEDURE (OUTPATIENT)
Dept: MAMMOGRAPHY | Facility: CLINIC | Age: 58
End: 2023-12-21
Payer: COMMERCIAL

## 2023-12-21 VITALS
TEMPERATURE: 98.4 F | BODY MASS INDEX: 35.97 KG/M2 | RESPIRATION RATE: 16 BRPM | HEART RATE: 52 BPM | SYSTOLIC BLOOD PRESSURE: 130 MMHG | HEIGHT: 63 IN | OXYGEN SATURATION: 100 % | DIASTOLIC BLOOD PRESSURE: 78 MMHG | WEIGHT: 203 LBS

## 2023-12-21 DIAGNOSIS — Z12.31 VISIT FOR SCREENING MAMMOGRAM: ICD-10-CM

## 2023-12-21 DIAGNOSIS — Z13.220 SCREENING FOR HYPERLIPIDEMIA: ICD-10-CM

## 2023-12-21 DIAGNOSIS — E03.4 HYPOTHYROIDISM DUE TO ACQUIRED ATROPHY OF THYROID: ICD-10-CM

## 2023-12-21 DIAGNOSIS — Z00.00 ROUTINE GENERAL MEDICAL EXAMINATION AT A HEALTH CARE FACILITY: Primary | ICD-10-CM

## 2023-12-21 DIAGNOSIS — Z23 NEED FOR VACCINATION: ICD-10-CM

## 2023-12-21 LAB
ALBUMIN SERPL BCG-MCNC: 4.4 G/DL (ref 3.5–5.2)
ALP SERPL-CCNC: 114 U/L (ref 40–150)
ALT SERPL W P-5'-P-CCNC: 16 U/L (ref 0–50)
ANION GAP SERPL CALCULATED.3IONS-SCNC: 9 MMOL/L (ref 7–15)
AST SERPL W P-5'-P-CCNC: 23 U/L (ref 0–45)
BILIRUB SERPL-MCNC: 0.4 MG/DL
BUN SERPL-MCNC: 9.5 MG/DL (ref 8–23)
CALCIUM SERPL-MCNC: 9.6 MG/DL (ref 8.6–10)
CHLORIDE SERPL-SCNC: 104 MMOL/L (ref 98–107)
CHOLEST SERPL-MCNC: 180 MG/DL
CREAT SERPL-MCNC: 0.87 MG/DL (ref 0.51–0.95)
DEPRECATED HCO3 PLAS-SCNC: 27 MMOL/L (ref 22–29)
EGFRCR SERPLBLD CKD-EPI 2021: 76 ML/MIN/1.73M2
FASTING STATUS PATIENT QL REPORTED: YES
GLUCOSE SERPL-MCNC: 93 MG/DL (ref 70–99)
HDLC SERPL-MCNC: 62 MG/DL
LDLC SERPL CALC-MCNC: 106 MG/DL
NONHDLC SERPL-MCNC: 118 MG/DL
POTASSIUM SERPL-SCNC: 4.2 MMOL/L (ref 3.4–5.3)
PROT SERPL-MCNC: 8 G/DL (ref 6.4–8.3)
SODIUM SERPL-SCNC: 140 MMOL/L (ref 135–145)
TRIGL SERPL-MCNC: 61 MG/DL
TSH SERPL DL<=0.005 MIU/L-ACNC: 2.83 UIU/ML (ref 0.3–4.2)

## 2023-12-21 PROCEDURE — 36415 COLL VENOUS BLD VENIPUNCTURE: CPT | Performed by: STUDENT IN AN ORGANIZED HEALTH CARE EDUCATION/TRAINING PROGRAM

## 2023-12-21 PROCEDURE — 80061 LIPID PANEL: CPT | Performed by: STUDENT IN AN ORGANIZED HEALTH CARE EDUCATION/TRAINING PROGRAM

## 2023-12-21 PROCEDURE — 90471 IMMUNIZATION ADMIN: CPT | Performed by: STUDENT IN AN ORGANIZED HEALTH CARE EDUCATION/TRAINING PROGRAM

## 2023-12-21 PROCEDURE — 90746 HEPB VACCINE 3 DOSE ADULT IM: CPT | Performed by: STUDENT IN AN ORGANIZED HEALTH CARE EDUCATION/TRAINING PROGRAM

## 2023-12-21 PROCEDURE — 77067 SCR MAMMO BI INCL CAD: CPT | Mod: TC | Performed by: RADIOLOGY

## 2023-12-21 PROCEDURE — 99396 PREV VISIT EST AGE 40-64: CPT | Mod: 25 | Performed by: STUDENT IN AN ORGANIZED HEALTH CARE EDUCATION/TRAINING PROGRAM

## 2023-12-21 PROCEDURE — 80053 COMPREHEN METABOLIC PANEL: CPT | Performed by: STUDENT IN AN ORGANIZED HEALTH CARE EDUCATION/TRAINING PROGRAM

## 2023-12-21 PROCEDURE — 77063 BREAST TOMOSYNTHESIS BI: CPT | Mod: TC | Performed by: RADIOLOGY

## 2023-12-21 PROCEDURE — 84443 ASSAY THYROID STIM HORMONE: CPT | Performed by: STUDENT IN AN ORGANIZED HEALTH CARE EDUCATION/TRAINING PROGRAM

## 2023-12-21 ASSESSMENT — ENCOUNTER SYMPTOMS
HEARTBURN: 0
JOINT SWELLING: 1
HEADACHES: 0
CHILLS: 0
BREAST MASS: 0
DYSURIA: 0
MYALGIAS: 0
HEMATURIA: 0
EYE PAIN: 0
PARESTHESIAS: 0
ABDOMINAL PAIN: 0
DIZZINESS: 0
SORE THROAT: 0
HEMATOCHEZIA: 0
PALPITATIONS: 0
ARTHRALGIAS: 1
NERVOUS/ANXIOUS: 0
SHORTNESS OF BREATH: 0
CONSTIPATION: 0
COUGH: 0
DIARRHEA: 0
FREQUENCY: 0
FEVER: 0
NAUSEA: 0

## 2023-12-21 NOTE — PROGRESS NOTES
SUBJECTIVE:   Cindy is a 58 year old, presenting for the following:  Physical        9/27/2023     9:04 AM   Additional Questions   Roomed by Modesta       Healthy Habits:     Getting at least 3 servings of Calcium per day:  Yes    Bi-annual eye exam:  Yes    Dental care twice a year:  Yes    Sleep apnea or symptoms of sleep apnea:  None    Diet:  Regular (no restrictions)    Frequency of exercise:  6-7 days/week    Duration of exercise:  45-60 minutes    Taking medications regularly:  Yes    Medication side effects:  None    Additional concerns today:  Yes                      Social History     Tobacco Use    Smoking status: Never    Smokeless tobacco: Never   Substance Use Topics    Alcohol use: Yes     Comment: 5 drinks a week         12/14/2023     9:15 AM   Alcohol Use   Prescreen: >3 drinks/day or >7 drinks/week? No     Reviewed orders with patient.  Reviewed health maintenance and updated orders accordingly - Yes  Lab work is in process  Labs reviewed in EPIC    Breast Cancer Screening:    FHS-7:       11/4/2021     8:34 AM 11/16/2021     7:50 AM 12/16/2022     8:25 AM   Breast CA Risk Assessment (FHS-7)   Did any of your first-degree relatives have breast or ovarian cancer? Yes Yes Yes   Did any of your relatives have bilateral breast cancer? No No No   Did any man in your family have breast cancer? No No No   Did any woman in your family have breast and ovarian cancer? No No No   Did any woman in your family have breast cancer before age 50 y? No No No   Do you have 2 or more relatives with breast and/or ovarian cancer? No No No   Do you have 2 or more relatives with breast and/or bowel cancer? No Yes No       Mammogram Screening: Recommended mammography every 1-2 years with patient discussion and risk factor consideration  Pertinent mammograms are reviewed under the imaging tab.    History of abnormal Pap smear: NO - age 30-65 PAP every 5 years with negative HPV co-testing recommended      Latest Ref Rng &  "Units 11/22/2021     7:25 AM 9/2/2016    10:00 AM 9/2/2016    12:00 AM   PAP / HPV   PAP  Negative for Intraepithelial Lesion or Malignancy (NILM)      PAP (Historical)    NIL    HPV 16 DNA Negative Negative  Negative     HPV 18 DNA Negative Negative  Negative     Other HR HPV Negative Negative  Negative       Reviewed and updated as needed this visit by clinical staff     Meds              Reviewed and updated as needed this visit by Provider                     Review of Systems   Constitutional:  Negative for chills and fever.   HENT:  Negative for congestion, ear pain, hearing loss and sore throat.    Eyes:  Negative for pain and visual disturbance.   Respiratory:  Negative for cough and shortness of breath.    Cardiovascular:  Negative for chest pain, palpitations and peripheral edema.   Gastrointestinal:  Negative for abdominal pain, constipation, diarrhea, heartburn, hematochezia and nausea.   Breasts:  Negative for tenderness, breast mass and discharge.   Genitourinary:  Negative for dysuria, frequency, genital sores, hematuria, pelvic pain, urgency, vaginal bleeding and vaginal discharge.   Musculoskeletal:  Positive for arthralgias and joint swelling. Negative for myalgias.   Skin:  Negative for rash.   Neurological:  Negative for dizziness, headaches and paresthesias.   Psychiatric/Behavioral:  Negative for mood changes. The patient is not nervous/anxious.           OBJECTIVE:   /78   Pulse 52   Temp 98.4  F (36.9  C) (Temporal)   Resp 16   Ht 1.588 m (5' 2.5\")   Wt 92.1 kg (203 lb)   LMP 06/24/2012   SpO2 100%   BMI 36.54 kg/m    Physical Exam  GENERAL: healthy, alert and no distress  EYES: Eyes grossly normal to inspection, PERRL and conjunctivae and sclerae normal  HENT: ear canals and TM's normal, nose and mouth without ulcers or lesions  NECK: no adenopathy, no asymmetry, masses, or scars and thyroid normal to palpation  RESP: lungs clear to auscultation - no rales, rhonchi or " wheezes  CV: regular rate and rhythm, normal S1 S2, no S3 or S4, no murmur, click or rub, no peripheral edema and peripheral pulses strong  ABDOMEN: soft, nontender, no hepatosplenomegaly, no masses and bowel sounds normal  MS: no gross musculoskeletal defects noted, no edema  SKIN: no suspicious lesions or rashes  NEURO: Normal strength and tone, mentation intact and speech normal  PSYCH: mentation appears normal, affect normal/bright    Diagnostic Test Results:  Labs reviewed in Epic  No results found for any visits on 12/21/23.    ASSESSMENT/PLAN:   (Z00.00) Routine general medical examination at a health care facility  (primary encounter diagnosis)  Comment: Stable  Plan: PRIMARY CARE FOLLOW-UP SCHEDULING            (E03.4) Hypothyroidism due to acquired atrophy of thyroid  Comment: Stable. Will send refills when labs return   Plan: TSH            (Z13.220) Screening for hyperlipidemia  Comment: Stable  Plan: Comprehensive metabolic panel (BMP + Alb, Alk         Phos, ALT, AST, Total. Bili, TP), Lipid panel         reflex to direct LDL Fasting            (Z23) Need for vaccination  Comment: Stable  Plan: HEPATITIS B VACCINE,  ADULT         (ENGERIX-B/RECOMBIVAX HB)                Patient has been advised of split billing requirements and indicates understanding: Yes      COUNSELING:  Reviewed preventive health counseling, as reflected in patient instructions        She reports that she has never smoked. She has never used smokeless tobacco.          ANGELICA MACKEY MD  [facility]

## 2023-12-22 DIAGNOSIS — E03.4 HYPOTHYROIDISM DUE TO ACQUIRED ATROPHY OF THYROID: ICD-10-CM

## 2023-12-22 RX ORDER — LEVOTHYROXINE SODIUM 75 UG/1
75 TABLET ORAL DAILY
Qty: 90 TABLET | Refills: 3 | Status: SHIPPED | OUTPATIENT
Start: 2023-12-22

## 2024-01-08 ENCOUNTER — ANCILLARY PROCEDURE (OUTPATIENT)
Dept: MAMMOGRAPHY | Facility: CLINIC | Age: 59
End: 2024-01-08
Attending: FAMILY MEDICINE
Payer: COMMERCIAL

## 2024-01-08 DIAGNOSIS — R92.8 ABNORMAL MAMMOGRAM: ICD-10-CM

## 2024-01-08 PROCEDURE — G0279 TOMOSYNTHESIS, MAMMO: HCPCS

## 2024-01-08 PROCEDURE — 77065 DX MAMMO INCL CAD UNI: CPT | Mod: LT

## 2024-01-12 ENCOUNTER — TRANSFERRED RECORDS (OUTPATIENT)
Dept: HEALTH INFORMATION MANAGEMENT | Facility: CLINIC | Age: 59
End: 2024-01-12
Payer: COMMERCIAL

## 2024-01-23 ENCOUNTER — ANCILLARY PROCEDURE (OUTPATIENT)
Dept: ULTRASOUND IMAGING | Facility: CLINIC | Age: 59
End: 2024-01-23
Attending: STUDENT IN AN ORGANIZED HEALTH CARE EDUCATION/TRAINING PROGRAM
Payer: COMMERCIAL

## 2024-01-23 ENCOUNTER — TELEPHONE (OUTPATIENT)
Dept: VASCULAR SURGERY | Facility: CLINIC | Age: 59
End: 2024-01-23

## 2024-01-23 ENCOUNTER — NURSE TRIAGE (OUTPATIENT)
Dept: FAMILY MEDICINE | Facility: CLINIC | Age: 59
End: 2024-01-23

## 2024-01-23 ENCOUNTER — OFFICE VISIT (OUTPATIENT)
Dept: FAMILY MEDICINE | Facility: CLINIC | Age: 59
End: 2024-01-23
Payer: COMMERCIAL

## 2024-01-23 VITALS
HEIGHT: 63 IN | HEART RATE: 68 BPM | SYSTOLIC BLOOD PRESSURE: 136 MMHG | RESPIRATION RATE: 16 BRPM | TEMPERATURE: 97 F | WEIGHT: 204.8 LBS | DIASTOLIC BLOOD PRESSURE: 82 MMHG | BODY MASS INDEX: 36.29 KG/M2 | OXYGEN SATURATION: 98 %

## 2024-01-23 DIAGNOSIS — I82.441 ACUTE DEEP VEIN THROMBOSIS (DVT) OF RIGHT TIBIAL VEIN (H): Primary | ICD-10-CM

## 2024-01-23 DIAGNOSIS — M71.21 BAKER'S CYST OF KNEE, RIGHT: ICD-10-CM

## 2024-01-23 DIAGNOSIS — M79.661 RIGHT CALF PAIN: ICD-10-CM

## 2024-01-23 DIAGNOSIS — M79.661 RIGHT CALF PAIN: Primary | ICD-10-CM

## 2024-01-23 PROCEDURE — 99214 OFFICE O/P EST MOD 30 MIN: CPT | Performed by: STUDENT IN AN ORGANIZED HEALTH CARE EDUCATION/TRAINING PROGRAM

## 2024-01-23 PROCEDURE — 93971 EXTREMITY STUDY: CPT | Mod: TC | Performed by: RADIOLOGY

## 2024-01-23 RX ORDER — MELOXICAM 7.5 MG/1
7.5 TABLET ORAL DAILY
COMMUNITY

## 2024-01-23 NOTE — PROGRESS NOTES
Spoke with radiologist who called concerning patient's imaging results    Results:   1. Positive for small DVT in distal posterior tibial vein at the level  of the ankle on the right.  2. Right Baker's cyst  3. Borderline enlarged lymph node in the right groin, nonspecific, may  be reactive.    Due to baker cyst being large and concern for rupture will place urgent ortho referral along with concern for new onset DVT. Pt will have to be a on DOAC for 3 month duration. Will place an urgent vascular consult for evaluation and next steps

## 2024-01-23 NOTE — PROGRESS NOTES
Assessment & Plan     (M79.661) Right calf pain  (primary encounter diagnosis)  Comment: Acute, uncontrolled. Need to rule out possible DVT. Known family history in sister. Will obtain imaging. If negative, likely MSK. Pt provide at home lifestyle changes  Plan: US Lower Extremity Venous Duplex Right        Pending     Dictation Disclaimer: Some of this Note has been completed with voice-recognition dictation software. Although errors are generally corrected real-time, there is the potential for a rare error to be present in the completed chart.                   Lisa White is a 58 year old, presenting for the following health issues:  right leg swelling         1/23/2024    11:17 AM   Additional Questions   Roomed by Modesta     History of Present Illness       Reason for visit:  Leg swelling  Symptom onset:  1-2 weeks ago    She eats 4 or more servings of fruits and vegetables daily.She consumes 0 sweetened beverage(s) daily.She exercises with enough effort to increase her heart rate 30 to 60 minutes per day.  She exercises with enough effort to increase her heart rate 6 days per week.   She is taking medications regularly.       Right leg swelling   Onset/Duration: 1 week   Description  Location: right ankle   Joint Swelling: YES  Redness: YES  Pain: YES  Warmth: No  Progression of Symptoms:  worsening  Accompanying signs and symptoms:   Fevers: No  Numbness/tingling/weakness: YES- tingling.   History  Trauma to the area: No  Recent illness:  No  Previous similar problem: No  Previous evaluation:  No  Precipitating or alleviating factors:  Aggravating factors include: Walking.   Therapies tried and outcome: Patient took Asprin 81 Mg this morning and Meloxicam 7.5 mg left over she had from before.       The patient presents for evaluation of calf pain and swelling.    Her calf and behind her knee have felt very tight and swollen for the last week. She thought it was a little yoga thing that she tweaked it,  "but it has not gotten better. She has a compression sleeve. It is worse, but it is not better. She denies any redness or warmth. She took a baby aspirin this morning. She has been taking meloxicam as needed. She saw orthopedics 2 weeks ago and received injections, which she never reacted to before. She has had 2 previous injections, and she felt a lot better.          Review of Systems  CONSTITUTIONAL: NEGATIVE for fever, chills, change in weight  ENT/MOUTH: NEGATIVE for ear, mouth and throat problems  RESP: NEGATIVE for significant cough or SOB  CV: NEGATIVE for chest pain, palpitations or peripheral edema  MUSCULOSKELETAL: POSITIVE  for right calf pain      Objective    /82   Pulse 68   Temp 97  F (36.1  C) (Temporal)   Resp 16   Ht 1.588 m (5' 2.5\")   Wt 92.9 kg (204 lb 12.8 oz)   LMP 06/24/2012   SpO2 98%   BMI 36.86 kg/m    Body mass index is 36.86 kg/m .  Physical Exam   GENERAL: alert and no distress  EYES: Eyes grossly normal to inspection, PERRL and conjunctivae and sclerae normal  MS: right calf: tenderness to palpation along calf posteriorly, swelling noticed in lower calf.     No results found for any visits on 01/23/24.        Signed Electronically by: ANGELICA MACKEY MD    "

## 2024-01-23 NOTE — TELEPHONE ENCOUNTER
Left message on answering machine for patient to call back to the nurse at [phone].    Please triage leg swelling    Rebeca Giraldo RN  [facility]

## 2024-01-23 NOTE — TELEPHONE ENCOUNTER
Spoke with patient. She has been experiencing swelling on her right leg about a week ago. She describes that the swelling is behind her knee and calf.     Patient denies any color differences between her legs. Swelling is not painful to touch. There is no redness or warmth in the area.     She describes that it is painful when she walks. She denies any history of blood clots.     Patient denies chest pain or difficulty breathing. She explains she has been experiencing fatigue and low appetite for the past week.    Thigh, calf, or ankle swelling in only one leg  R/O: deep vein thrombosis (DVT). Note: The patient will likely need a physical exam and duplex ultrasound of the leg. In some healthcare organizations the emergency department may be the best source of care for expediting this evaluation.    Yes Go To Office Now     Advised patient should be seen in clinic today regarding symptoms. Patient verbalized understanding and has no further questions at this time.     Assisted with booking appointment today at 11:20 am as requested.     VERITO Bazzi RN  [facility]

## 2024-01-23 NOTE — TELEPHONE ENCOUNTER
Referring providers name, location, phone number and fax: Roma Cole MD in  [facility] [address] [facility] [address]  P:[phone]  F:[phone]    Reason for visit: Acute deep vein thrombosis (DVT) of right tibial vein (H) [I82.441]      Does patient have a referral:Yes - Urgent 3-5 days    Referral to specific provider? No, but notes on referral from [facility] to schedule with [facility]    Medical records or notes if possible:Epic

## 2024-01-24 NOTE — TELEPHONE ENCOUNTER
Pt Cindy calling back for urgent referral. Advised that she will be contacted with 24 hours. Okay to leave a voicemail or send [employer]ut[employer] message.

## 2024-01-26 ENCOUNTER — LAB (OUTPATIENT)
Dept: LAB | Facility: CLINIC | Age: 59
End: 2024-01-26
Payer: COMMERCIAL

## 2024-01-26 ENCOUNTER — OFFICE VISIT (OUTPATIENT)
Dept: VASCULAR SURGERY | Facility: CLINIC | Age: 59
End: 2024-01-26
Attending: STUDENT IN AN ORGANIZED HEALTH CARE EDUCATION/TRAINING PROGRAM
Payer: COMMERCIAL

## 2024-01-26 VITALS — HEART RATE: 67 BPM | SYSTOLIC BLOOD PRESSURE: 146 MMHG | DIASTOLIC BLOOD PRESSURE: 89 MMHG | OXYGEN SATURATION: 97 %

## 2024-01-26 DIAGNOSIS — Z82.49 FAMILY HISTORY OF DVT: ICD-10-CM

## 2024-01-26 DIAGNOSIS — I82.441 ACUTE DEEP VEIN THROMBOSIS (DVT) OF RIGHT TIBIAL VEIN (H): Primary | ICD-10-CM

## 2024-01-26 DIAGNOSIS — I82.441 ACUTE DEEP VEIN THROMBOSIS (DVT) OF RIGHT TIBIAL VEIN (H): ICD-10-CM

## 2024-01-26 LAB
D DIMER PPP FEU-MCNC: 2.2 UG/ML FEU (ref 0–0.5)
ERYTHROCYTE [DISTWIDTH] IN BLOOD BY AUTOMATED COUNT: 18.2 % (ref 10–15)
HCT VFR BLD AUTO: 38.2 % (ref 35–47)
HGB BLD-MCNC: 11.8 G/DL (ref 11.7–15.7)
MCH RBC QN AUTO: 23.5 PG (ref 26.5–33)
MCHC RBC AUTO-ENTMCNC: 30.9 G/DL (ref 31.5–36.5)
MCV RBC AUTO: 76 FL (ref 78–100)
PLATELET # BLD AUTO: 443 10E3/UL (ref 150–450)
RBC # BLD AUTO: 5.02 10E6/UL (ref 3.8–5.2)
WBC # BLD AUTO: 7.4 10E3/UL (ref 4–11)

## 2024-01-26 PROCEDURE — 86146 BETA-2 GLYCOPROTEIN ANTIBODY: CPT

## 2024-01-26 PROCEDURE — 86147 CARDIOLIPIN ANTIBODY EA IG: CPT

## 2024-01-26 PROCEDURE — 99204 OFFICE O/P NEW MOD 45 MIN: CPT | Performed by: HOSPITALIST

## 2024-01-26 PROCEDURE — 85027 COMPLETE CBC AUTOMATED: CPT

## 2024-01-26 PROCEDURE — 85390 FIBRINOLYSINS SCREEN I&R: CPT | Performed by: PATHOLOGY

## 2024-01-26 PROCEDURE — 85613 RUSSELL VIPER VENOM DILUTED: CPT

## 2024-01-26 PROCEDURE — 36415 COLL VENOUS BLD VENIPUNCTURE: CPT

## 2024-01-26 PROCEDURE — 85379 FIBRIN DEGRADATION QUANT: CPT

## 2024-01-26 PROCEDURE — 85730 THROMBOPLASTIN TIME PARTIAL: CPT

## 2024-01-26 RX ORDER — APIXABAN 5 MG (74)
KIT ORAL
Qty: 74 EACH | Refills: 0 | Status: SHIPPED | OUTPATIENT
Start: 2024-01-26 | End: 2024-02-25

## 2024-01-26 ASSESSMENT — PAIN SCALES - GENERAL: PAINLEVEL: MODERATE PAIN (4)

## 2024-01-26 NOTE — NURSING NOTE
Chief Complaint   Patient presents with    New Patient     New Vascular Patient- Concern for new onset distal DVT in posterior tibial vein     Vitals were taken and medications reconciled.    Errol Omer, YOLANDA  8:02 AM

## 2024-01-26 NOTE — PATIENT INSTRUCTIONS
Thank you so much for choosing us for your care. It was a pleasure to see you at the vascular clinic today.     Follow-up recommendations: We will see you back in 3 months. Please do not hesitate to reach out to us in the meantime with any concerns. Our schedulers will get in touch with you to set up your follow-up visit.    Additional testing/imaging ordered today: Please get your labs drawn prior to starting Eliquis. Repeat venous ultrasound in 3 months.      Our scheduling team will get in touch with you to set up any follow-up testing/imaging and/or appointments. Please be aware that any testing/imaging recommended today will need to completed prior to your next visit with the provider. If testing/imaging is not completed prior to your next visit, your visit may be rescheduled.     If you have any questions, please contact our clinic directly at [phone] and ask for the nurse. We also encourage the use of [employer] to communicate with your healthcare provider.    If you have an urgent need after business hours (8:00 am to 4:30 pm) please call [phone], option 4, and ask for the vascular attending on call. For non-urgent after hours needs, please call the vascular clinic at [phone]. For scheduling needs, please call our clinic directly at [phone].    =====================================================================    [facility] is recognized by the [facility] as a comprehensive stroke center. As part of our commitment to better patient outcomes and excellent stroke education, we attach the below stroke education materials to ALL of the after visit summaries in our vascular clinic.        Learning About BE FAST: Stroke Warning Signs  BE FAST is a simple way to remember the main symptoms of stroke. These symptoms happen suddenly. So learning what to look for helps you know when to call for medical help. BE FAST stands for:    B - Balance.  Loss of balance or  trouble walking.     E - Eyes.  Trouble seeing out of one or both eyes.     F - Face.  Weakness or drooping on one side of the face.     A - Arm.  Weakness or numbness in an arm or leg.     S - Speech.  Trouble speaking.     T - Time to call 911.  Also call 911 if you have other stroke symptoms. They include:  Sudden confusion.  Sudden trouble understanding simple statements.  Fainting.  A seizure.  A sudden, severe headache.     A stroke happens when a blood vessel in the brain bursts or is blocked by a blood clot. The blood supply to part of the brain--and the oxygen the blood carries--is reduced. This damages the brain.   If you have a stroke, quick treatment may save your life. And it may reduce the damage in your brain so that you have fewer problems after the stroke.   Current as of: December 18, 2022               Content Version: 13.8    8204-9560 [email or web address].   Care instructions adapted under license by your healthcare professional. If you have questions about a medical condition or this instruction, always ask your healthcare professional. [email or web address] disclaims any warranty or liability for your use of this information.    Learning About How to Prevent a Stroke  What is a stroke?  A stroke is damage to the brain that occurs when a blood vessel in the brain bursts or is blocked by a blood clot. Without blood and the oxygen it carries, part of the brain starts to die. The part of the body controlled by the damaged area of the brain can't work properly.  Brain damage can start within minutes of a stroke. But quick treatment can help limit the damage and increase the chance of a full recovery.  What puts you at risk for stroke?  A risk factor is anything that makes you more likely to have a particular health problem.  Risk factors for stroke that you can manage or change include:  Health problems like atrial fibrillation, diabetes, high blood pressure, high cholesterol, hardening of  the arteries (atherosclerosis), and sickle cell disease.  Smoking.  Drinking more than 2 alcoholic drinks a day for men and 1 drink a day for women.  Being overweight.  Not eating healthy foods.  Not getting enough physical activity.  Risk factors you can't change include:  Having a previous stroke.  Family history of stroke.  Being older.  Being , [place] Native, , or South  American.  Being female.  Having certain problems during pregnancy, such as preeclampsia.  Being past menopause.  Your doctor can help you know your risk. Then you and your doctor can talk about whether to take steps to lower it.  How can you help prevent a stroke?  Here are some things you can do to help prevent a stroke.  Manage health problems that raise your risk. These include atrial fibrillation, diabetes, high blood pressure, and high cholesterol.  Have a heart-healthy lifestyle.  Don't smoke. If you need help quitting, talk to your doctor about stop-smoking programs and medicines. These can increase your chances of quitting for good.  Limit alcohol to 2 drinks a day for men and 1 drink a day for women.  Stay at a healthy weight. Lose weight if you need to.  Be active. Get at least 30 minutes of exercise on most days of the week. Walking is a good choice. You also may want to do other activities, such as running, swimming, cycling, or playing tennis or team sports.  Eat heart-healthy foods. These include vegetables, fruits, nuts, beans, lean meat, fish, and whole grains. Limit sodium and sugar.  If you think you may have a problem with alcohol or drug use, talk to your doctor.  If you use hormone therapy for menopause or hormonal birth control, talk with your doctor. Ask if these are right for you. They may raise the risk of stroke in some people.  Decide with your doctor whether you will also take medicines to help lower your risk. For example, you and your doctor may decide you will take a medicine  that prevents blood clots.  What are the symptoms of a stroke?  Symptoms of a stroke happen quickly. A stroke may cause:  Sudden numbness, tingling, weakness, or loss of movement in your face, arm, or leg, especially on only one side of your body.  Sudden vision changes.  Sudden trouble speaking.  Sudden confusion or trouble understanding simple statements.  Sudden problems with walking or balance.  A sudden, severe headache that is different from past headaches.  Fainting.  A seizure.  It's important to call for medical help if you have stroke symptoms. Quick treatment may save your life. And it may reduce the damage in your brain so that you have fewer problems after the stroke.  Follow-up care is a key part of your treatment and safety. Be sure to make and go to all appointments, and call your doctor if you are having problems. It's also a good idea to know your test results and keep a list of the medicines you take.    Current as of: December 18, 2022               Content Version: 13.8    7096-2001 [employer].   Care instructions adapted under license by your healthcare professional. If you have questions about a medical condition or this instruction, always ask your healthcare professional. [employer] disclaims any warranty or liability for your use of this information.

## 2024-01-26 NOTE — PROGRESS NOTES
VASCULAR MEDICINE CONSULT NOTE          LOCATION:  [facility]        Date of Service: 1/26/2024      Primary Care Provider: Roma Cole  Referring provider;  Roma Cole      Reason for the visit/chief complaint:   Acute right posterior tibial DVT      HPI:  Ave Willoughby is a pleasant 58 year old female who presents to our Vascular Medicine clinic as a referral from PCP after she was recently diagnosed with acute DVT.    Ms. Willoughby reports that she has been having back of the knee and right leg pain for a week.  She exercises every day and thought she might have tweaked it during yoga.  Other than that, no major trauma.  No recent surgery, prolonged immobility, prolonged car or plane ride, etc.  She was seen by PCP 2 days ago 1/23.  Lower extremity venous duplex ultrasound was positive for small DVT in the right posterior tibial vein at the level of the ankle.  No other veins were involved within the deep or superficial system ultrasound also showed a right Baker's cyst 4.9 x 1.2 x 5.4 cm.  Patient was subsequently referred to our clinic urgently to decide on anticoagulation as well as to orthopedic clinic for her Baker's cyst.    Other medical history includes hypothyroidism, sicca syndrome, psoriasis, primary osteoarthritis of bilateral knees, lumbar foraminal stenosis and obesity class II BMI 36.8.    Ms. Willoughby denies any prior personal history of DVT.  Denies personal history of varicose veins.    Reports positive family history of DVT in her younger sister at age 49-50 that was attributed to prolonged plane ride of approximately 6 hours.  Not currently on any hormonal therapy. No prior pregnancies.  Was previously on OCP in her 20s with no consequences.    Had one previous major surgery that was open appendectomy in 2011 with no complications.    She is up-to-date with age-appropriate cancer screening, just had colonoscopy and mammogram  within the past year, she has done these since her 30s due to family history of breast cancer in her mother and colon cancer in her father.  Up-to-date with Pap smear.    Non smoker. Recently retired .       REVIEW OF SYSTEMS:    A 12 point ROS was reviewed and is negative except what is mentioned in HPI.       Past medical history, surgical history, medications, family history, social history and allergies were reviewed. Pertinent points mentioned under HPI.      OBJECTIVE:    Vital signs:  BP (!) 146/89 (BP Location: Left arm, Patient Position: Chair, Cuff Size: Adult Large)   Pulse 67   LMP 06/24/2012   SpO2 97%   Wt Readings from Last 1 Encounters:   01/23/24 204 lb 12.8 oz     There is no height or weight on file to calculate BMI.    Physical exam:  General appearance: Pleasant female in no apparent distress.    HEENT: NC/AT.     Heart: RRR. Normal S1, S2. No murmur, rub, click, or gallop.   Chest: Clear to auscultation bilaterally.  Extremities: Lateral lower extremities with some fullness to the right ankle.  Nonpitting.  Few scattered spider veins on the right lower extremity.  No varicose veins, no reticular veins.  DP and PT palpable 2/2 bilaterally  Skin: No stasis dermatitis, lipodermatosclerosis or skin wounds.  Neurological: Alert, awake and oriented       DIAGNOSTIC STUDIES:   Labs and diagnostics reviewed including outside records. Pertinent points are mentioned under HPI and assessment and plan sections.        ASSESSMENT AND PLAN:    Acute right distal DVT in the posterior tibial vein  Family history of DVT  Obesity class II BMI 36.8  Sicca syndrome    Today, spent time with Ms. Willoughby discussing her recent DVT diagnosis.  We discussed the circumstances around her diagnosis and after careful consideration, there was no clear provoking factor.  Now Jones's cyst could sometimes lead to compression of the popliteal vein and could potentially lead to calf DVT.  However,  this is an usual with no clear compression is seen on her ultrasound.  Therefore, we will cautiously call this unprovoked at this time.    We overall discussed it is a small DVT that is unlikely to cause any consequences in her lower extremity veins in the future.  She has minor risk factors such as obesity.  Although her family history could suggest an underlying inherited thrombophilia, we discussed that testing for inherited thrombophilia at this age will unlikely change our decision.  With her autoimmune background however, I believe it is reasonable to rule out antiphospholipid syndrome.  She has not yet started any anticoagulation and we will go ahead and order lupus anticoagulant, anticardiolipin antibodies IgG and IgM and antibeta 2 glycoprotein IgG and IgM.      We overall discussed 2 approaches in treating distal DVT: One is expectant approach when we could avoid anticoagulation and do serial ultrasounds.  The other is anticoagulation for at least 3 months with very low bleeding risk, I would favor anticoagulation and Ms. Willoughby was in agreement. Will also check D-dimer and obtain CBC.  We discussed options such as DOAC and warfarin.  Will be in favor of starting DOAC.  We discussed apixaban versus rivaroxaban.  Patient would like to go with apixaban.  Discussed bleeding risks.    Our plan will be to repeat her venous duplex ultrasound in 3 months with follow-up appointment to discuss anticoagulation discontinuation.  We briefly discussed HERDOO2 score and we will likely favor discontinuation of anticoagulation, check D-dimer 2-4 weeks off anticoagulation and if low, remain off anticoagulation.  We will discuss more details in our next visit.    Recommendations:  Start apixaban 10 mg twice daily for the first 7 days then 5 mg twice daily thereafter.  Prescribed the starter and the maintenance dose.  Obtain antiphospholipid syndrome workup prior to starting apixaban today.  Check CBC and D-dimer.  Follow-up  in 3 months with repeat venous duplex ultrasound prior to that visit to discuss anticoagulation discontinuation.  Will be in touch with the patient if there is any major abnormalities in her labs from today.  Discussed no restriction to her activities from DVT perspective.  Also no objection for her upcoming vacation in a month as long as she is on anticoagulation, wearing compression stocking during her flight and staying active.    It was a pleasure meeting with Ms. Willoughby in our clinic today.    Irma Steel MD  Vascular Medicine  January 26, 2024

## 2024-01-28 LAB
B2 GLYCOPROT1 IGG SERPL IA-ACNC: 1.9 U/ML
B2 GLYCOPROT1 IGM SERPL IA-ACNC: <2.4 U/ML
CARDIOLIPIN IGG SER IA-ACNC: <2 GPL-U/ML
CARDIOLIPIN IGG SER IA-ACNC: NEGATIVE
CARDIOLIPIN IGM SER IA-ACNC: <2 MPL-U/ML
CARDIOLIPIN IGM SER IA-ACNC: NEGATIVE

## 2024-01-29 ENCOUNTER — OFFICE VISIT (OUTPATIENT)
Dept: ORTHOPEDICS | Facility: CLINIC | Age: 59
End: 2024-01-29
Attending: STUDENT IN AN ORGANIZED HEALTH CARE EDUCATION/TRAINING PROGRAM
Payer: COMMERCIAL

## 2024-01-29 ENCOUNTER — TELEPHONE (OUTPATIENT)
Dept: VASCULAR SURGERY | Facility: CLINIC | Age: 59
End: 2024-01-29

## 2024-01-29 ENCOUNTER — TELEPHONE (OUTPATIENT)
Dept: CARDIOLOGY | Facility: CLINIC | Age: 59
End: 2024-01-29

## 2024-01-29 ENCOUNTER — ANCILLARY PROCEDURE (OUTPATIENT)
Dept: GENERAL RADIOLOGY | Facility: CLINIC | Age: 59
End: 2024-01-29
Attending: PEDIATRICS
Payer: COMMERCIAL

## 2024-01-29 VITALS
BODY MASS INDEX: 35.79 KG/M2 | HEIGHT: 63 IN | DIASTOLIC BLOOD PRESSURE: 76 MMHG | WEIGHT: 202 LBS | SYSTOLIC BLOOD PRESSURE: 132 MMHG

## 2024-01-29 DIAGNOSIS — M71.21 BAKER'S CYST OF KNEE, RIGHT: ICD-10-CM

## 2024-01-29 DIAGNOSIS — M17.10 ARTHRITIS OF KNEE: Primary | ICD-10-CM

## 2024-01-29 LAB
DRVVT SCREEN RATIO: 0.94
INR PPP: 1.04 (ref 0.85–1.15)
LA PPP-IMP: NEGATIVE
LUPUS INTERPRETATION: NORMAL
PTT RATIO: 1.03
THROMBIN TIME: 17.8 SECONDS (ref 13–19)

## 2024-01-29 PROCEDURE — 99244 OFF/OP CNSLTJ NEW/EST MOD 40: CPT | Performed by: PEDIATRICS

## 2024-01-29 PROCEDURE — 73562 X-RAY EXAM OF KNEE 3: CPT | Mod: TC | Performed by: RADIOLOGY

## 2024-01-29 RX ORDER — CLOBETASOL PROPIONATE 0.5 MG/G
CREAM TOPICAL 2 TIMES DAILY
COMMUNITY

## 2024-01-29 NOTE — TELEPHONE ENCOUNTER
1/29 LVM and MyC for patient to schedule 3 month follow-up with ultrasound prior    Follow-up with Dr. Steel

## 2024-01-29 NOTE — PROGRESS NOTES
ASSESSMENT & PLAN    Cindy was seen today for pain.    Diagnoses and all orders for this visit:    Arthritis of knee  -     Orthopedic  Referral; Future    Baker's cyst of knee, right  -     Orthopedic  Referral  -     XR Knee Standing AP Elcho Bilat Lat Right; Future  -     Orthopedic  Referral; Future      This issue is acute on chronic and Unchanged.        ICD-10-CM    1. Arthritis of knee  M17.10 Orthopedic  Referral      2. Baker's cyst of knee, right  M71.21 Orthopedic  Referral     XR Knee Standing AP Elcho Bilat Lat Right     Orthopedic  Referral        Patient Instructions   Symptoms likely from a Baker's cyst. I reviewed images and discussed the diagnosis - A baker's cyst is a collection of fluid in the posterior knee, usually from irritation of knee joint pathology, Right knee arthritis in this case.  I discussed the natural course - Most cysts will resolve on their own with supportive care including rest, ice, compression.  A cyst may rupture causing calf pain.  I discussed possible treatment including supportive care and physical therapy for knee strengthening, trial of intra-articular steroid injection and lastly, US guided evaluation and possible aspiration/injection of the cyst itself.  Imaging is also an option, though not typically necessary.  I discussed the possibility of recurrence.    Plan:  - Today's Plan of Care:  Referral for US guided Baker's cyst drainage - patient was just started on anti-coagulation so would wait a few weeks prior to this procedure.    Continue with relative rest and activity modification, Ice, Compression, and Elevation.  Can apply ice 10-15 minutes 3-4 times per day as needed. OTC medications as needed.    Home Exercise Program    -We also discussed other future treatment options:  Joint injection, corticosteroid  Referral to Orthopedic Surgery    Follow Up: as needed    Concerning signs and symptoms were  "reviewed and all questions were answered at this time.    Thanks for the opportunity to participate in the care of this patient, I will keep you updated on their progress.    CC: Roma Gr MD [facility]  Sports Medicine Physician  [facility]    -----  Right knee pain    SUBJECTIVE  Ave Willoughby is a/an 58 year old female who is seen in consultation at the request of Roma Cole M.D. for evaluation of right knee.     The patient is seen by themselves.    Onset: DOI 1/15/2024, 2 week(s) ago. Patient describes injury as possibly after yoga  Location of Pain: right behind knee  Worsened by: bending  Better with: heat  Treatments tried: ice, heat, elevation and other medications:   Associated symptoms: swelling, numbness, tingling, and pain  - Went to primary care due to right knee and leg swelling, was found to have leg DVT (now seeing vascular) and knee Baker's Cyst  - This symptom feels different than typical arthritis pain    Orthopedic/Surgical history: YES, recently injections at [facility] 1/12/2024, Hyaluronic Acid  Social History/Occupation: exercise classes, walking (1.5 mi)      REVIEW OF SYSTEMS:  Review of Systems    OBJECTIVE:  /76   Ht 1.588 m (5' 2.52\")   Wt 91.6 kg (202 lb)   LMP 06/24/2012   BMI 36.33 kg/m     General: healthy, alert and in no distress  Skin: no suspicious lesions or rash.  CV: distal perfusion intact   Resp: normal respiratory effort without conversational dyspnea   Psych: normal mood and affect  Gait: NORMAL  Neuro: Normal light sensory exam of lower extremity    Right Knee exam    Inspection:      mild effusion right, mild posterior swelling    Patella:      Crepitus noted in the patellofemoral joint right    Tender:      medial joint line right       lateral joint line right    Non Tender:      remainder of knee area right    Knee ROM:      Range of motion limited in full flexion and extension right    Hip ROM:     " Full active and passive ROM bilateral    Strength:      5-/5 with knee extension right    Special Tests:     some pain with Lawrence right       neg (-) anterior drawer right       neg (-) posterior drawer right       neg (-) varus at 0 deg and 30 deg right       neg (-) valgus at 0 deg and 30 deg right    Gait:      normal    Lower Extremity Alignment:      Normal    Neurovascular:      2+ peripheral pulses bilaterally and brisk capillary refill       sensation grossly intact      RADIOLOGY:  Final results and radiologist's interpretation, available in the [facility] health record.  Images were reviewed with the patient in the office today.  My personal interpretation of the performed imaging:     AP and sunrise bilateral and right lateral XR views of knees reviewed: no acute bony abnormality, tricompartmental degenerative changes worse in patellofemoral compartment  - will follow official read    US 1/23/2024 IMPRESSION:  1. Positive for small DVT in distal posterior tibial vein at the level  of the ankle on the right.  2. Right Baker's cyst  3. Borderline enlarged lymph node in the right groin, nonspecific, may  be reactive    Reviewed PCP, Vascular and prior [facility] notes  Review of the result(s) of each unique test - XR

## 2024-01-29 NOTE — PATIENT INSTRUCTIONS
Symptoms likely from a Baker's cyst. I reviewed images and discussed the diagnosis - A baker's cyst is a collection of fluid in the posterior knee, usually from irritation of knee joint pathology, Right knee arthritis in this case.  I discussed the natural course - Most cysts will resolve on their own with supportive care including rest, ice, compression.  A cyst may rupture causing calf pain.  I discussed possible treatment including supportive care and physical therapy for knee strengthening, trial of intra-articular steroid injection and lastly, US guided evaluation and possible aspiration/injection of the cyst itself.  Imaging is also an option, though not typically necessary.  I discussed the possibility of recurrence.    Plan:  - Today's Plan of Care:  Referral for US guided Baker's cyst drainage    Continue with relative rest and activity modification, Ice, Compression, and Elevation.  Can apply ice 10-15 minutes 3-4 times per day as needed. OTC medications as needed.    Home Exercise Program    -We also discussed other future treatment options:  Joint injection, corticosteroid  Referral to Orthopedic Surgery    Follow Up: as needed    If you have any further questions for your physician or physician s care team you can call [phone] and use option 3 to leave a voice message.

## 2024-01-29 NOTE — LETTER
1/29/2024         RE: Ave Willoughby  [address] [facility] [id number]        Dear Colleague,    Thank you for referring your patient, Ave Willoughby, to the [facility] [place]. Please see a copy of my visit note below.    ASSESSMENT & PLAN    Cindy was seen today for pain.    Diagnoses and all orders for this visit:    Arthritis of knee  -     Orthopedic  Referral; Future    Baker's cyst of knee, right  -     Orthopedic  Referral  -     XR Knee Standing AP [place] Bilat Lat Right; Future  -     Orthopedic  Referral; Future      This issue is acute on chronic and Unchanged.        ICD-10-CM    1. Arthritis of knee  M17.10 Orthopedic  Referral      2. Baker's cyst of knee, right  M71.21 Orthopedic  Referral     XR Knee Standing AP [place] Bilat Lat Right     Orthopedic  Referral        Patient Instructions   Symptoms likely from a Baker's cyst. I reviewed images and discussed the diagnosis - A baker's cyst is a collection of fluid in the posterior knee, usually from irritation of knee joint pathology, Right knee arthritis in this case.  I discussed the natural course - Most cysts will resolve on their own with supportive care including rest, ice, compression.  A cyst may rupture causing calf pain.  I discussed possible treatment including supportive care and physical therapy for knee strengthening, trial of intra-articular steroid injection and lastly, US guided evaluation and possible aspiration/injection of the cyst itself.  Imaging is also an option, though not typically necessary.  I discussed the possibility of recurrence.    Plan:  - Today's Plan of Care:  Referral for US guided Baker's cyst drainage - patient was just started on anti-coagulation so would wait a few weeks prior to this procedure.    Continue with relative rest and activity modification, Ice, Compression, and Elevation.  Can apply ice 10-15  "minutes 3-4 times per day as needed. OTC medications as needed.    Home Exercise Program    -We also discussed other future treatment options:  Joint injection, corticosteroid  Referral to Orthopedic Surgery    Follow Up: as needed    Concerning signs and symptoms were reviewed and all questions were answered at this time.    Thanks for the opportunity to participate in the care of this patient, I will keep you updated on their progress.    CC: Roma Gr MD [facility]  Sports Medicine Physician  [facility]    -----  Right knee pain    SUBJECTIVE  Ave Willoughby is a/an 58 year old female who is seen in consultation at the request of Roma Cole M.D. for evaluation of right knee.     The patient is seen by themselves.    Onset: DOI 1/15/2024, 2 week(s) ago. Patient describes injury as possibly after yoga  Location of Pain: right behind knee  Worsened by: bending  Better with: heat  Treatments tried: ice, heat, elevation and other medications:   Associated symptoms: swelling, numbness, tingling, and pain  - Went to primary care due to right knee and leg swelling, was found to have leg DVT (now seeing vascular) and knee Baker's Cyst  - This symptom feels different than typical arthritis pain    Orthopedic/Surgical history: YES, recently injections at [facility] 1/12/2024, Hyaluronic Acid  Social History/Occupation: exercise classes, walking (1.5 mi)      REVIEW OF SYSTEMS:  Review of Systems    OBJECTIVE:  /76   Ht 1.588 m (5' 2.52\")   Wt 91.6 kg (202 lb)   LMP 06/24/2012   BMI 36.33 kg/m     General: healthy, alert and in no distress  Skin: no suspicious lesions or rash.  CV: distal perfusion intact   Resp: normal respiratory effort without conversational dyspnea   Psych: normal mood and affect  Gait: NORMAL  Neuro: Normal light sensory exam of lower extremity    Right Knee exam    Inspection:      mild effusion right, mild posterior swelling    Patella: "      Crepitus noted in the patellofemoral joint right    Tender:      medial joint line right       lateral joint line right    Non Tender:      remainder of knee area right    Knee ROM:      Range of motion limited in full flexion and extension right    Hip ROM:     Full active and passive ROM bilateral    Strength:      5-/5 with knee extension right    Special Tests:     some pain with Lawrence right       neg (-) anterior drawer right       neg (-) posterior drawer right       neg (-) varus at 0 deg and 30 deg right       neg (-) valgus at 0 deg and 30 deg right    Gait:      normal    Lower Extremity Alignment:      Normal    Neurovascular:      2+ peripheral pulses bilaterally and brisk capillary refill       sensation grossly intact      RADIOLOGY:  Final results and radiologist's interpretation, available in the [facility] health record.  Images were reviewed with the patient in the office today.  My personal interpretation of the performed imaging:     AP and sunrise bilateral and right lateral XR views of knees reviewed: no acute bony abnormality, tricompartmental degenerative changes worse in patellofemoral compartment  - will follow official read    US 1/23/2024 IMPRESSION:  1. Positive for small DVT in distal posterior tibial vein at the level  of the ankle on the right.  2. Right Baker's cyst  3. Borderline enlarged lymph node in the right groin, nonspecific, may  be reactive    Reviewed PCP, Vascular and prior [facility] notes  Review of the result(s) of each unique test - XR              Again, thank you for allowing me to participate in the care of your patient.        Sincerely,        Jessica Gr MD

## 2024-01-30 ENCOUNTER — TELEPHONE (OUTPATIENT)
Dept: VASCULAR SURGERY | Facility: CLINIC | Age: 59
End: 2024-01-30
Payer: COMMERCIAL

## 2024-01-30 NOTE — TELEPHONE ENCOUNTER
[facility] Call Center    Phone Message    May a detailed message be left on voicemail: yes     Reason for Call: Appointment Intake    Referring Provider Name: SHELBIE  Diagnosis and/or Symptoms: pt missed the call from [place] 01/29/24. pt is returning call to schedule a 3 month follow-up Appt with ultrasound prior. Please call pt to schedule. Thanks     Action Taken: Message routed to:  [facility] VS/IR    Travel Screening: Not Applicable

## 2024-01-31 NOTE — TELEPHONE ENCOUNTER
The is scheduled on 4/26 at the [facility] [place] and with Elisa York on 1/31/2024 at 1:12 PM    The pt was on the phone during scheduling of the above appointments and agreed to the dates times and locations of the appointments.

## 2024-01-31 NOTE — TELEPHONE ENCOUNTER
[facility] Call Center    Phone Message    May a detailed message be left on voicemail: yes     Reason for Call: pt is still waiting for a call back. Please call pt back to schedule 12-3 is best to call today 01/31/24. Pt is getting frustrated and would like a call back today. Thanks     Action Taken: Message routed to:  [facility] VS    Travel Screening: Not Applicable

## 2024-02-05 ENCOUNTER — OFFICE VISIT (OUTPATIENT)
Dept: RHEUMATOLOGY | Facility: CLINIC | Age: 59
End: 2024-02-05
Payer: COMMERCIAL

## 2024-02-05 VITALS
BODY MASS INDEX: 36.73 KG/M2 | WEIGHT: 204.2 LBS | SYSTOLIC BLOOD PRESSURE: 142 MMHG | OXYGEN SATURATION: 98 % | DIASTOLIC BLOOD PRESSURE: 84 MMHG | HEART RATE: 70 BPM

## 2024-02-05 DIAGNOSIS — M25.50 MULTIPLE JOINT PAIN: Primary | ICD-10-CM

## 2024-02-05 DIAGNOSIS — M35.00 SICCA SYNDROME (H): ICD-10-CM

## 2024-02-05 PROCEDURE — 99214 OFFICE O/P EST MOD 30 MIN: CPT | Performed by: INTERNAL MEDICINE

## 2024-02-05 NOTE — PATIENT INSTRUCTIONS
RHEUMATOLOGY    [facility]  [address] [place] [address]  [place] [address]    Phone number: [phone]  Fax number: [phone]    If you need a medication refill, please contact us as you may need lab work and/or a follow up visit prior to your refill.      Thank you for choosing [facility]!    Connie Elam CMA Rheumatology

## 2024-02-05 NOTE — NURSING NOTE
RAPID3 (0-30) Cumulative Score  9.0          RAPID3 Weighted Score (divide #4 by 3 and that is the weighted score)  3.0

## 2024-02-05 NOTE — PROGRESS NOTES
Rheumatology Clinic Visit      Ave Willoughby MRN# [id number]   YOB: 1965 Age: 58 year old      Date of visit: 2/05/24   PCP: Dr. Tayla Espinal    Chief Complaint   Patient presents with:  Arthritis: Pain, achy, fatigue and stiffness since January.  Right leg she got a blood clot and a baker cyst also.      Assessment and Plan     1.  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  Dry eyes are treated with artificial tears; uses contacts.  Advised that she try using preservative-free artificial tears, and to follow-up with her ophthalmologist to see about possibly getting contacts for dry eyes.  No dry mouth, she is already using Biotene products, sugar-free lozenges, sugar-free gum, avoidance of sugary/caffeinated/carbonated beverages, following with the dentist twice yearly and periodontist twice yearly for periodontal disease.  Significant dry mouth when first evaluated on 8/22/2023.  We reviewed the suspicion for Sjogren's syndrome, but the SSA and SSB were negative.  We discussed the option of minor salivary gland biopsy but this would not change the symptomatic treatment at this point, and therefore she would like to not proceed with the minor salivary gland biopsy.  Discussed using pilocarpine in August 2023 and chose to proceed with that and found that 5 mg twice daily is very effective.  Doing well with regard to dry mouth.    - Continue pilocarpine 5 mg twice daily    2. Primary osteoarthritis of both knees: Has improved with Synvisc injections from orthopedics.  Topical Voltaren gel partially effective.  - diclofenac (VOLTAREN) 1 % topical gel; Apply up to 2 grams of 1% gel to upper extremity and up to 4 grams of 1% gel to lower extremity up to 4 times daily as needed for joint pain      3. Primary osteoarthritis of both hands: Evidenced by squaring of bilateral first CMC joints Heberden's nodes, Luis's nodes.  Topical Voltaren gel effective.  Hand therapy referral  given today.  - diclofenac (VOLTAREN) 1 % topical gel; Apply up to 2 grams of 1% gel to upper extremity and up to 4 grams of 1% gel to lower extremity up to 4 times daily as needed for joint pain  Dispense: 400 g; Refill: 2    4. Primary osteoarthritis of both feet: History of bunion surgery.  Currently with bony hypertrophy at the first MTPs.  Stiff soled shoes whenever ambulatory, indoors and outdoors, to prevent repetitive articulation at the MTPs.    5.  DVT: Currently anticoagulation.  Antiphospholipid antibodies were negative.  Documented here for cervical significance.    6.  Joint pain: Continues to follow with orthopedics for bilateral knee osteoarthritis.  Stable for CMC joint osteoarthritis.  More pain at the PIPs bilaterally that she says were mildly swollen but not swollen now and are nontender to palpation.  No synovitis seen on exam.  Question if this is a developing inflammatory arthritis but no synovitis on exam today so plan to reassess in the future.  Discussed reevaluation in 4 months and she is in agreement.  She reports having general body aches and feeling unwell within 1 month prior to the DVT diagnosis; symptoms possibly related to the DVT?  Reviewed negative cardiolipin, beta-2 glycoprotein, and lupus anticoagulant labs.    7. Elevated blood pressure:  Cindy to follow up with Primary Care provider regarding elevated blood pressure.     Total minutes spent in evaluation with patient, documentation, , and review of pertinent studies and chart notes: 18    Ms. Willoughby verbalized agreement with and understanding of the rational for the diagnosis and treatment plan.  All questions were answered to best of my ability and the patient's satisfaction. Ms. Willoughby was advised to contact the clinic with any questions that may arise after the clinic visit.      Thank you for involving me in the care of the patient    Return to clinic: 4 months    HPI   Ave Willoughby is a 58 year old female  with a past medical history significant for lumbar foraminal stenosis, hypothyroidism, and positive JUD who presents for  rheumatology follow-up.    8/22/2023: 3-5 years of worsening joint symptoms; feeling of swelling in the hands, knees, and feet; worse in the AM, better with activity.  Morning stiffness for about 2 hours.  Dry eyes/mouth/vaginal.  Wears contacts; uses artificial tears (one for when using contacts, and one for when contacts are not in).  Dry mouth tx'd with Biotene mouthwash at bedtime, occasional a biotene spray; sipping water several times per hour; sugar free gum sometimes; sugar free lozenges.  Seeing a dentist and periodontist each twice yearly. Canker sores 3-4 x/yr, occurring for as long as she can remember.; not always in the same location, sometimes painful; hx of cavities but none for the past few years.    Staying active; doing a triathlon this weekend.     Had palpitations previously but none now.  Hypothyroidism on stable treatment for a while now.  Used to use meloxicam but no longer.    Has been seen at [facility] where Synvisc injection for each knee has been helpful    11/30/2023: Voltaren gel is helpful.  She would like to go to hand therapy for hand osteoarthritis.  Pilocarpine 5 mg twice daily is very effective for dry mouth; no longer having significant dry mouth.  Pilocarpine 3 times daily was too much -hot sweating.    Today, 2/5/2024: Started to have general ill feeling, more fatigue, then few weeks later diagnosed with DVT.  Currently on anticoagulation at direction of another clinic.  At 1 point had more joint pain in the PIPs where there was also swelling; pain at the PIPs was not necessarily better or worse with activity.  Swelling of the PIPs has since resolved.  Feel that she is doing better now.      Denies fevers, chills, nausea, vomiting, constipation, diarrhea. No abdominal pain.  No black or bloody stools.  No chest pain/pressure, palpitations, or  shortness of breath at this time. No LE swelling.  No rash.    Tobacco: none  EtOH: 4 drinks/weeks  Drugs: none  Occupation:     ROS   12 point review of system was completed and negative except as noted in the HPI     Active Problem List     Patient Active Problem List   Diagnosis    Lumbar foraminal stenosis    Family history of colon cancer    Hypothyroidism due to acquired atrophy of thyroid    History of colonic polyps    Anhidrosis     Past Medical History     Past Medical History:   Diagnosis Date    Family history of colon cancer     due 2013 for     History of colonic polyps 6/6/2023    Hypothyroid     Hypothyroidism due to acquired atrophy of thyroid 10/15/2015    Lumbar foraminal stenosis 7/17/2012    Sciatic nerve disease      Past Surgical History     Past Surgical History:   Procedure Laterality Date    APPENDECTOMY  4/11/2011    Dr Lona Duncan    COLONOSCOPY  9-29-08    COLONOSCOPY N/A 8/9/2018    Procedure: COMBINED COLONOSCOPY, SINGLE OR MULTIPLE BIOPSY/POLYPECTOMY BY BIOPSY;;  Surgeon: Lc Cervantes MD;  Location: [facility] OR    COLONOSCOPY WITH CO2 INSUFFLATION N/A 8/9/2018    Procedure: COLONOSCOPY WITH CO2 INSUFFLATION;  COLON SCREEN/ PATTIE;  Surgeon: Lc Cervantes MD;  Location: [facility]    COLONOSCOPY WITH CO2 INSUFFLATION N/A 10/16/2023    Procedure: Colonoscopy with CO2 insufflation;  Surgeon: Calvin Bar MD;  Location: [facility] OR    ENDOSCOPY UPPER, COLONOSCOPY, COMBINED  7/1/2013    Procedure: COMBINED ENDOSCOPY UPPER, COLONOSCOPY;  COLONOSCOPY SCREEN-FAMILY HX OF COLON CANCER/ EGD-UPPER ENDOSCOPY-UPPER GI SYMPTOMS/ PATTIE;  Surgeon: Lc Cervantes MD;  Location: [facility] OR    ESOPHAGOSCOPY, GASTROSCOPY, DUODENOSCOPY (EGD), COMBINED  7/1/2013    Procedure: COMBINED ESOPHAGOSCOPY, GASTROSCOPY, DUODENOSCOPY (EGD), BIOPSY SINGLE OR MULTIPLE;;  Surgeon: Lc Cervantes MD;  Location: [facility] OR     Allergy     Allergies    Allergen Reactions    Pcn [Penicillins]      Current Medication List     Current Outpatient Medications   Medication Sig    apixaban ANTICOAGULANT (ELIQUIS) 5 MG tablet Take 1 tablet (5 mg) by mouth 2 times daily    clobetasol (TEMOVATE) 0.05 % external cream Apply topically 2 times daily    diclofenac (VOLTAREN) 1 % topical gel Apply up to 2 grams of 1% gel to upper extremity and up to 4 grams of 1% gel to lower extremity up to 4 times daily as needed for joint pain    famotidine (PEPCID) 20 MG tablet Take 20 mg by mouth daily    ketoconazole (NIZORAL) 2 % external shampoo WASH TO AFFECTED AREA 2-3X WEEKLY IN THE SHOWER. LATHER AND LET SIT 1 TO 2 MINUTES PRIOR TO RINSING.    levothyroxine (SYNTHROID/LEVOTHROID) 75 MCG tablet Take 1 tablet (75 mcg) by mouth daily    Multiple Vitamin (MULTIVITAMIN ADULT PO)     pilocarpine (SALAGEN) 5 MG tablet Take 1 tablet (5 mg) by mouth 2 times daily    Vitamin D (Cholecalciferol) 25 MCG (1000 UT) CAPS     Apixaban Starter Pack (ELIQUIS DVT/PE STARTER PACK) 5 MG TBPK Take 10 mg by mouth 2 times daily for 7 days, THEN 5 mg 2 times daily for 23 days.    meloxicam (MOBIC) 7.5 MG tablet Take 7.5 mg by mouth daily (Patient not taking: Reported on 1/26/2024)     Current Facility-Administered Medications   Medication    cyanocobalamin injection 1,000 mcg     Social History   See HPI    Family History     Family History   Problem Relation Age of Onset    Breast Cancer Mother     Thyroid Disease Mother     Cancer - colorectal Father     Cancer Father         brain    Diabetes Maternal Grandmother     Cardiovascular Paternal Grandmother     Thyroid Disease Sister      Sister: sjogrens.   Uncle: RA.     Physical Exam     Temp Readings from Last 3 Encounters:   01/23/24 97  F (36.1  C) (Temporal)   12/21/23 98.4  F (36.9  C) (Temporal)   10/16/23 97.6  F (36.4  C) (Temporal)     BP Readings from Last 5 Encounters:   01/29/24 132/76   01/26/24 (!) 146/89   01/23/24 136/82   12/21/23 130/78  "  11/29/23 126/84     Pulse Readings from Last 1 Encounters:   01/26/24 67     Resp Readings from Last 1 Encounters:   01/23/24 16     Estimated body mass index is 36.33 kg/m  as calculated from the following:    Height as of 1/29/24: 1.588 m (5' 2.52\").    Weight as of 1/29/24: 91.6 kg (202 lb).    GEN: NAD.   HEENT:  Anicteric, noninjected sclera. No obvious external lesions of the ear and nose. Hearing intact.  CV: S1, S2. RRR. No m/r/g  PULM: No increased work of breathing. CTA bilaterally   MSK: MCPs, PIPs, DIPs without swelling or tenderness to palpation.  Wrists without swelling or tenderness to palpation.  Elbows and shoulders without swelling or tenderness to palpation.    Knees, ankles, and MTPs without swelling or tenderness to palpation.    SKIN: No rash or jaundice seen  PSYCH: Alert. Appropriate.      Labs / Imaging (select studies)     RF/CCP  Recent Labs   Lab Test 03/09/23  1246   CCPIGG 1.1   RHF <7     JDU  Recent Labs   Lab Test 03/09/23  1246   LEISA Positive*   ANAP1 Speckled   ANAT1 1:320     RNP/Sm/SSA/SSB  Recent Labs   Lab Test 03/09/23  1246   SSAIGG Negative   SSBIGG Negative     Antiphospholipid Antibodies  Recent Labs   Lab Test 01/26/24  1000   B2GPG 1.9   B2GPM <2.4   CARDG Negative   CARDM Negative   LUPINT Negative  The INR is normal.  APTT ratio is normal.    DRVVT Screen ratio is normal.  Thrombin time is normal.  NEGATIVE TEST; A LUPUS ANTICOAGULANT WAS NOT DETECTED IN THIS SPECIMEN WITHIN THE LIMITS OF THE TESTING REPERTOIRE.  If the clinical picture is strongly suggestive of an antiphospholipid syndrome, recommend anticardiolipin and beta-2-glycoprotein (IgG and IgM) antibody tests.    Bianca Salazar MD, PhD  [facility]         CBC  Recent Labs   Lab Test 01/26/24  1000 12/16/22  0852 11/22/21  0804 11/21/19  1613 06/12/18  1433   WBC 7.4 4.6 4.5   < > 5.9   RBC 5.02 4.83 4.67   < > 5.29*   HGB 11.8 12.8 12.2   < > 14.0   HCT 38.2 40.3 38.9   < > 42.7   MCV 76* 83 83   " < > 81   RDW 18.2* 15.2* 16.5*   < > 19.1*    375 350   < > 348   MCH 23.5* 26.5 26.1*   < > 26.5   MCHC 30.9* 31.8 31.4*   < > 32.8   NEUTROPHIL  --   --   --   --  53.1   LYMPH  --   --   --   --  32.4   MONOCYTE  --   --   --   --  7.9   EOSINOPHIL  --   --   --   --  5.4   BASOPHIL  --   --   --   --  1.2   ANEU  --   --   --   --  3.2   ALYM  --   --   --   --  1.9   JOSE JUAN  --   --   --   --  0.5   AEOS  --   --   --   --  0.3   ABAS  --   --   --   --  0.1    < > = values in this interval not displayed.     CMP  Recent Labs   Lab Test 12/21/23  0941 12/16/22  0852 11/22/21  0804 10/16/20  1615 11/21/19  1613 11/08/18  1542 09/07/17  1622    141 141 139 138  --  139   POTASSIUM 4.2 4.0 4.1 3.9 3.7  --  4.3   CHLORIDE 104 108 110* 104 106  --  105   CO2 27 29 27 26 29  --  23   ANIONGAP 9 4 4 9 3  --  11   GLC 93 96 94 86 84 80 93   BUN 9.5 13 12 9 11  --  10   CR 0.87 0.80 0.79 0.80 0.88  --  0.85   GFRESTIMATED 76 85 83 82 74  --  70   GFRESTBLACK  --   --   --  >90 86  --  85   MARIBEL 9.6 9.3 9.2 8.7 9.3  --  9.2   BILITOTAL 0.4  --   --   --   --   --   --    ALBUMIN 4.4  --   --   --   --   --   --    PROTTOTAL 8.0  --   --   --   --   --   --    ALKPHOS 114  --   --   --   --   --   --    AST 23  --   --   --   --   --   --    ALT 16  --   --   --   --   --   --      HgA1c  Recent Labs   Lab Test 12/16/22  0852   A1C 5.7*     Iron Studies  Recent Labs   Lab Test 11/22/21  0804 06/12/18  1433   LUIS MANUEL 8 8     Calcium/VitaminD  Recent Labs   Lab Test 12/21/23  0941 12/16/22  0852 11/22/21  0804   MARIBEL 9.6 9.3 9.2     ESR/CRP  Recent Labs   Lab Test 12/16/22  0852 06/12/18  1433   SED  --  10   CRP <2.9 <2.9     TSH/T4  Recent Labs   Lab Test 12/21/23  0941 12/16/22  0852 11/22/21  0804 10/16/20  1615 11/21/19  1613   TSH 2.83 3.77 4.23*   < > 4.36*   T4  --   --  1.04  --  1.07    < > = values in this interval not displayed.     Lipid Panel  Recent Labs   Lab Test 12/21/23  0941 12/16/22  0852  11/08/18  1542   CHOL 180 185 161   TRIG 61 63 130   HDL 62 62 52   * 110* 83   NHDL 118 123 109     Hepatitis C  Recent Labs   Lab Test 09/02/16  1006   HCVAB Nonreactive   Assay performance characteristics have not been established for newborns,   infants, and children       Lyme ab screening  Recent Labs   Lab Test 06/12/18  1433   LYMEGM 0.14     HIV Screening  Recent Labs   Lab Test 06/12/18  1433   HIAGAB Nonreactive     Immunization History     Immunization History   Administered Date(s) Administered    COVID-19 12+ (2023-24) (Pfizer) 12/13/2023    COVID-19 Bivalent 18+ (Moderna) 11/26/2022    COVID-19 Monovalent 18+ (Moderna) 02/27/2021, 03/27/2021, 11/05/2021, 05/25/2022    DT (PEDS <7y) 04/01/1999    Flu, Unspecified 10/13/2022    Hepatitis B, Adult 12/21/2023    Influenza (IIV3) PF 10/20/2010, 12/01/2011, 10/01/2012, 10/27/2018, 10/12/2019, 09/18/2020    Influenza Vaccine 18-64 (Flublok) 09/27/2023    Influenza Vaccine >6 months,quad, PF 09/26/2013, 10/27/2014, 10/15/2015, 09/02/2016, 10/27/2018, 09/25/2021, 10/13/2022    Influenza, seasonal, injectable, PF 10/20/2010, 12/01/2011    TD,PF 7+ (Tenivac) 08/01/2010    TDAP Vaccine (Adacel) 11/21/2019    Zoster recombinant adjuvanted (SHINGRIX) 11/21/2019, 02/17/2020          Chart documentation done in part with Dragon Voice recognition Software. Although reviewed after completion, some word and grammatical error may remain.    Aron Cee MD

## 2024-02-08 ENCOUNTER — OFFICE VISIT (OUTPATIENT)
Dept: ORTHOPEDICS | Facility: CLINIC | Age: 59
End: 2024-02-08
Payer: COMMERCIAL

## 2024-02-08 DIAGNOSIS — M71.21 BAKER'S CYST OF KNEE, RIGHT: Primary | ICD-10-CM

## 2024-02-08 DIAGNOSIS — M17.10 ARTHRITIS OF KNEE: ICD-10-CM

## 2024-02-08 PROCEDURE — 20611 DRAIN/INJ JOINT/BURSA W/US: CPT | Mod: RT | Performed by: FAMILY MEDICINE

## 2024-02-08 RX ORDER — ROPIVACAINE HYDROCHLORIDE 5 MG/ML
4 INJECTION, SOLUTION EPIDURAL; INFILTRATION; PERINEURAL
Status: SHIPPED | OUTPATIENT
Start: 2024-02-08

## 2024-02-08 RX ORDER — BETAMETHASONE SODIUM PHOSPHATE AND BETAMETHASONE ACETATE 3; 3 MG/ML; MG/ML
6 INJECTION, SUSPENSION INTRA-ARTICULAR; INTRALESIONAL; INTRAMUSCULAR; SOFT TISSUE
Status: SHIPPED | OUTPATIENT
Start: 2024-02-08

## 2024-02-08 RX ADMIN — ROPIVACAINE HYDROCHLORIDE 4 ML: 5 INJECTION, SOLUTION EPIDURAL; INFILTRATION; PERINEURAL at 08:28

## 2024-02-08 RX ADMIN — BETAMETHASONE SODIUM PHOSPHATE AND BETAMETHASONE ACETATE 6 MG: 3; 3 INJECTION, SUSPENSION INTRA-ARTICULAR; INTRALESIONAL; INTRAMUSCULAR; SOFT TISSUE at 08:28

## 2024-02-08 NOTE — LETTER
2/8/2024         RE: Ave Willough[place]  [address] [facility] [address]        Dear Colleague,    Thank you for referring your patient, Ave Willoughby, to the [facility] [place]. Please see a copy of my visit note below.    Large Joint Injection/Arthocentesis: R knee joint    Date/Time: 2/8/2024 8:28 AM    Performed by: Aaron James MD  Authorized by: Aaron James MD    Indications:  Pain  Needle Size:  25 G  Guidance: ultrasound    Approach:  Superolateral  Location:  Knee      Medications:  6 mg betamethasone acet & sod phos 6 (3-3) MG/ML; 4 mL ROPivacaine 5 MG/ML  Aspirate amount (mL):  13  Aspirate:  Yellow  Outcome:  Tolerated well, no immediate complications  Procedure discussed: discussed risks, benefits, and alternatives    Consent Given by:  Patient  Timeout: timeout called immediately prior to procedure    Prep: patient was prepped and draped in usual sterile fashion     Ultrasound images of procedure were permanently stored.      Patient reported significant improvement of pain after the numbing portion right knee Baker's cyst aspiration/steroid injection.  Ultrasound guided images were permanently stored.  Aftercare instructions given to patient.  Plan to follow-up as previously discussed with referring provider.     Aaron James MD [facility]      I was present with the resident during the history and exam.  I discussed the case with the resident and agree with the findings as documented in the assessment and plan.         Again, thank you for allowing me to participate in the care of your patient.        Sincerely,        Aaron James MD

## 2024-02-08 NOTE — PROGRESS NOTES
Large Joint Injection/Arthocentesis: R knee joint    Date/Time: 2/8/2024 8:28 AM    Performed by: Aaron James MD  Authorized by: Aaron James MD    Indications:  Pain  Needle Size:  25 G  Guidance: ultrasound    Approach:  Superolateral  Location:  Knee      Medications:  6 mg betamethasone acet & sod phos 6 (3-3) MG/ML; 4 mL ROPivacaine 5 MG/ML  Aspirate amount (mL):  13  Aspirate:  Yellow  Outcome:  Tolerated well, no immediate complications  Procedure discussed: discussed risks, benefits, and alternatives    Consent Given by:  Patient  Timeout: timeout called immediately prior to procedure    Prep: patient was prepped and draped in usual sterile fashion     Ultrasound images of procedure were permanently stored.      Patient reported significant improvement of pain after the numbing portion right knee Baker's cyst aspiration/steroid injection.  Ultrasound guided images were permanently stored.  Aftercare instructions given to patient.  Plan to follow-up as previously discussed with referring provider.     Aaron James MD [facility]      I was present with the resident during the history and exam.  I discussed the case with the resident and agree with the findings as documented in the assessment and plan.

## 2024-02-08 NOTE — PATIENT INSTRUCTIONS
[facility] [place] [place] [facility]    Procedure: Right Knee Jones's Cyst Aspiration/Steroid Injection    You may shower, however avoid swimming, tub baths or hot tubs for 24 hours following your procedure  You may have a mild to moderate increase in pain for several days following the injection.  It may take up to 14 days for the steroid medication to start working although you may feel the effect as early as a few days after the procedure.  You may use ice packs for 10-15 minutes, 3 to 4 times a day at the injection site for comfort  You may use anti-inflammatory medications (such as Ibuprofen or Aleve or Advil) or Tylenol for pain control if necessary  If you were fasting, you may resume your normal diet and medications after the procedure  If you have diabetes, check your blood sugar more frequently than usual as your blood sugar may be higher than normal for 10-14 days following a steroid injection. Contact your doctor who manages your diabetes if your blood sugar is higher than usual    If you experience any of the following, call [facility] @ [phone] or [phone]  -Fever over 100 degree F  -Swelling, bleeding, redness, drainage, warmth at the injection site  - New or worsening pain     It was great seeing you today!    Aaron James

## 2024-02-26 ENCOUNTER — TRANSFERRED RECORDS (OUTPATIENT)
Dept: HEALTH INFORMATION MANAGEMENT | Facility: CLINIC | Age: 59
End: 2024-02-26
Payer: COMMERCIAL

## 2024-04-10 ENCOUNTER — OFFICE VISIT (OUTPATIENT)
Dept: FAMILY MEDICINE | Facility: CLINIC | Age: 59
End: 2024-04-10
Payer: COMMERCIAL

## 2024-04-10 ENCOUNTER — NURSE TRIAGE (OUTPATIENT)
Dept: FAMILY MEDICINE | Facility: CLINIC | Age: 59
End: 2024-04-10

## 2024-04-10 ENCOUNTER — ALLIED HEALTH/NURSE VISIT (OUTPATIENT)
Dept: FAMILY MEDICINE | Facility: CLINIC | Age: 59
End: 2024-04-10
Payer: COMMERCIAL

## 2024-04-10 VITALS
OXYGEN SATURATION: 99 % | TEMPERATURE: 98.4 F | HEIGHT: 63 IN | HEART RATE: 60 BPM | WEIGHT: 200.8 LBS | DIASTOLIC BLOOD PRESSURE: 74 MMHG | BODY MASS INDEX: 35.58 KG/M2 | SYSTOLIC BLOOD PRESSURE: 136 MMHG | RESPIRATION RATE: 14 BRPM

## 2024-04-10 DIAGNOSIS — Z23 ENCOUNTER FOR IMMUNIZATION: Primary | ICD-10-CM

## 2024-04-10 DIAGNOSIS — M25.562 POSTERIOR KNEE PAIN, LEFT: Primary | ICD-10-CM

## 2024-04-10 PROCEDURE — G2211 COMPLEX E/M VISIT ADD ON: HCPCS | Performed by: STUDENT IN AN ORGANIZED HEALTH CARE EDUCATION/TRAINING PROGRAM

## 2024-04-10 PROCEDURE — 90471 IMMUNIZATION ADMIN: CPT

## 2024-04-10 PROCEDURE — 99213 OFFICE O/P EST LOW 20 MIN: CPT | Performed by: STUDENT IN AN ORGANIZED HEALTH CARE EDUCATION/TRAINING PROGRAM

## 2024-04-10 PROCEDURE — 99207 PR NO CHARGE NURSE ONLY: CPT

## 2024-04-10 PROCEDURE — 90746 HEPB VACCINE 3 DOSE ADULT IM: CPT

## 2024-04-10 NOTE — PROGRESS NOTES
Prior to immunization administration, verified patients identity using patient s name and date of birth. Please see Immunization Activity for additional information.     Screening Questionnaire for Adult Immunization    Are you sick today?   No   Do you have allergies to medications, food, a vaccine component or latex?   No   Have you ever had a serious reaction after receiving a vaccination?   No   Do you have a long-term health problem with heart, lung, kidney, or metabolic disease (e.g., diabetes), asthma, a blood disorder, no spleen, complement component deficiency, a cochlear implant, or a spinal fluid leak?  Are you on long-term aspirin therapy?   No   Do you have cancer, leukemia, HIV/AIDS, or any other immune system problem?   No   Do you have a parent, brother, or sister with an immune system problem?   No   In the past 3 months, have you taken medications that affect  your immune system, such as prednisone, other steroids, or anticancer drugs; drugs for the treatment of rheumatoid arthritis, Crohn s disease, or psoriasis; or have you had radiation treatments?   No   Have you had a seizure, or a brain or other nervous system problem?   No   During the past year, have you received a transfusion of blood or blood    products, or been given immune (gamma) globulin or antiviral drug?   No   For women: Are you pregnant or is there a chance you could become       pregnant during the next month?   No   Have you received any vaccinations in the past 4 weeks?   No     Immunization questionnaire answers were all negative.    I have reviewed the following standing orders:   This patient is due and qualifies for the Hepatitis B vaccine.    Click here for Hepatitis B Standing Order    I have reviewed the vaccines inclusion and exclusion criteria; No concerns regarding eligibility.     Patient instructed to remain in clinic for 15 minutes afterwards, and to report any adverse reactions.     Screening performed by Komal  JOANN Dawson on 4/10/2024 at 12:04 PM.

## 2024-04-10 NOTE — PROGRESS NOTES
"  Assessment & Plan     (M25.562) Posterior knee pain, left  (primary encounter diagnosis)  Comment: Chronic, uncontrolled. Concern for possible baker's cyst and less concern for DVT at this time as patient is on eliquis. Pending being seen by vascular at the end of the month. Due to constant pain and interference with ambulation will recommend an urgent eval with sports medicine. Referral placed  Plan: REVIEW OF HEALTH MAINTENANCE PROTOCOL ORDERS,         Orthopedic  Referral            The longitudinal plan of care for the diagnosis(es)/condition(s) as documented were addressed during this visit. Due to the added complexity in care, I will continue to support Cindy in the subsequent management and with ongoing continuity of care.              BMI  Estimated body mass index is 36.14 kg/m  as calculated from the following:    Height as of this encounter: 1.588 m (5' 2.5\").    Weight as of this encounter: 91.1 kg (200 lb 12.8 oz).   Weight management plan: not discussed          Subjective   Cindy is a 58 year old, presenting for the following health issues:  Edema        4/10/2024     3:19 PM   Additional Questions   Roomed by Modesta     History of Present Illness       Reason for visit:  Left knee pain/swelling  Symptom onset:  More than a month  Symptoms include:  Tightness, swelling, decreased mobility,  Symptom intensity:  Moderate  Symptom progression:  Worsening  Had these symptoms before:  Yes  Has tried/received treatment for these symptoms:  Yes  Previous treatment was successful:  Yes  Prior treatment description:  Ultrasound found a Baker s cyst in right leg and it was drained  What makes it worse:  Going up stairs, sudden twisting movements  What makes it better:  Compression sleeve, ice, gentle movement    She eats 2-3 servings of fruits and vegetables daily.She consumes 0 sweetened beverage(s) daily.She exercises with enough effort to increase her heart rate 30 to 60 minutes per day.  She exercises " with enough effort to increase her heart rate 6 days per week.   She is taking medications regularly.           Patient calling     In Jan, she had a blood clot and a cyst in her right leg. She is on a blood thinner now - eliquis 5 mg BID     She was out of town since recently but since Feb, she has been having a little bit of swelling behind her left knee. Some days it will be more swollen than others. She has no pain, no redness, no fever, no chest pain, no difficulty breathing. She is able to walk on it and put weight on it as normal.     Per disposition, she should go to office now. RN was able to schedule her a visit with her pcp for today and RN advised when to seek emergent medical attention or call 911 and patient verbalized understanding     Reason for Disposition   Thigh, calf, or ankle swelling in only one leg    Additional Information   Negative: Sounds like a life-threatening emergency to the triager   Negative: Chest pain   Negative: Followed an insect bite and has localized swelling (e.g., small area of puffy or swollen skin)   Negative: Followed a knee injury   Negative: Ankle or foot injury   Negative: Pregnant with leg swelling or edema   Negative: Difficulty breathing at rest   Negative: Entire foot is cool or blue in comparison to other side   Negative: SEVERE swelling (e.g., swelling extends above knee, entire leg is swollen, weeping fluid)   Negative: Cast on leg or ankle and has increasing pain   Negative: Can't walk or can barely stand (new-onset)   Negative: Fever and red area (or area very tender to touch)   Negative: Patient sounds very sick or weak to the triager   Negative: Swelling of face, arm or hands  (Exception: Slight puffiness of fingers during hot weather.)   Negative: Pregnant 20 or more weeks and sudden weight gain (i.e., > 2 lbs, 1 kg in one week)   Negative: Thigh or calf pain and only 1 side and present > 1 hour    Answer Assessment - Initial Assessment Questions  1. ONSET:  "\"When did the swelling start?\" (e.g., minutes, hours, days)      End of February 2. LOCATION: \"What part of the leg is swollen?\"  \"Are both legs swollen or just one leg?\"      Behind the knee on the left leg  3. SEVERITY: \"How bad is the swelling?\" (e.g., localized; mild, moderate, severe)    - Localized: Small area of swelling localized to one leg.    - MILD pedal edema: Swelling limited to foot and ankle, pitting edema < 1/4 inch (6 mm) deep, rest and elevation eliminate most or all swelling.    - MODERATE edema: Swelling of lower leg to knee, pitting edema > 1/4 inch (6 mm) deep, rest and elevation only partially reduce swelling.    - SEVERE edema: Swelling extends above knee, facial or hand swelling present.       Sometimes her whole leg gets swollen and then the next day it can feel more normal, but always feels swollen behind the knee  4. REDNESS: \"Does the swelling look red or infected?\"      no  5. PAIN: \"Is the swelling painful to touch?\" If Yes, ask: \"How painful is it?\"   (Scale 1-10; mild, moderate or severe)      Just a little achy but no pain  6. FEVER: \"Do you have a fever?\" If Yes, ask: \"What is it, how was it measured, and when did it start?\"       no  7. CAUSE: \"What do you think is causing the leg swelling?\"      unknown  8. MEDICAL HISTORY: \"Do you have a history of blood clots (e.g., DVT), cancer, heart failure, kidney disease, or liver failure?\"      yes  9. RECURRENT SYMPTOM: \"Have you had leg swelling before?\" If Yes, ask: \"When was the last time?\" \"What happened that time?\"      She had a cyst and a blood clot on right leg last time  10. OTHER SYMPTOMS: \"Do you have any other symptoms?\" (e.g., chest pain, difficulty breathing)        no  11. PREGNANCY: \"Is there any chance you are pregnant?\" \"When was your last menstrual period?\"        N/a    Protocols used: Leg Swelling and Edema-A-[place]  Leighann Multani RN        Pt reports seeing an Ortho and reports receiving a shot of cortisone prior to " "going to [place] for a month. She reports needing to follow up at the end of April with [facility]. She reports that she is wondering if she has another baker's cysts behind the left knee. She reports losing range of  motion and going up and down the stairs causes pain.                ROS: 10 point ROS neg other than the symptoms noted above in the HPI.        Objective    /74   Pulse 60   Temp 98.4  F (36.9  C) (Oral)   Resp 14   Ht 1.588 m (5' 2.5\")   Wt 91.1 kg (200 lb 12.8 oz)   LMP 06/24/2012   SpO2 99%   BMI 36.14 kg/m    Body mass index is 36.14 kg/m .  Physical Exam   GENERAL: alert and no distress  EYES: Eyes grossly normal to inspection, PERRL and conjunctivae and sclerae normal  MS: LLE exam shows tenderness to palpation along anterior knee at midline of joint space, swelling notable in posterior knee, mild swelling seen around the knee  SKIN: no suspicious lesions or rashes  PSYCH: mentation appears normal, affect normal/bright            Signed Electronically by: ANGELICA RUIZ MD    "

## 2024-04-10 NOTE — TELEPHONE ENCOUNTER
"Patient calling    In Jan, she had a blood clot and a cyst in her right leg. She is on a blood thinner now - eliquis 5 mg BID    She was out of town since recently but since Feb, she has been having a little bit of swelling behind her left knee. Some days it will be more swollen than others. She has no pain, no redness, no fever, no chest pain, no difficulty breathing. She is able to walk on it and put weight on it as normal.    Per disposition, she should go to office now. RN was able to schedule her a visit with her pcp for today and RN advised when to seek emergent medical attention or call 911 and patient verbalized understanding    Reason for Disposition   Thigh, calf, or ankle swelling in only one leg    Additional Information   Negative: Sounds like a life-threatening emergency to the triager   Negative: Chest pain   Negative: Followed an insect bite and has localized swelling (e.g., small area of puffy or swollen skin)   Negative: Followed a knee injury   Negative: Ankle or foot injury   Negative: Pregnant with leg swelling or edema   Negative: Difficulty breathing at rest   Negative: Entire foot is cool or blue in comparison to other side   Negative: SEVERE swelling (e.g., swelling extends above knee, entire leg is swollen, weeping fluid)   Negative: Cast on leg or ankle and has increasing pain   Negative: Can't walk or can barely stand (new-onset)   Negative: Fever and red area (or area very tender to touch)   Negative: Patient sounds very sick or weak to the triager   Negative: Swelling of face, arm or hands  (Exception: Slight puffiness of fingers during hot weather.)   Negative: Pregnant 20 or more weeks and sudden weight gain (i.e., > 2 lbs, 1 kg in one week)   Negative: Thigh or calf pain and only 1 side and present > 1 hour    Answer Assessment - Initial Assessment Questions  1. ONSET: \"When did the swelling start?\" (e.g., minutes, hours, days)      End of February  2. LOCATION: \"What part of the leg " "is swollen?\"  \"Are both legs swollen or just one leg?\"      Behind the knee on the left leg  3. SEVERITY: \"How bad is the swelling?\" (e.g., localized; mild, moderate, severe)    - Localized: Small area of swelling localized to one leg.    - MILD pedal edema: Swelling limited to foot and ankle, pitting edema < 1/4 inch (6 mm) deep, rest and elevation eliminate most or all swelling.    - MODERATE edema: Swelling of lower leg to knee, pitting edema > 1/4 inch (6 mm) deep, rest and elevation only partially reduce swelling.    - SEVERE edema: Swelling extends above knee, facial or hand swelling present.       Sometimes her whole leg gets swollen and then the next day it can feel more normal, but always feels swollen behind the knee  4. REDNESS: \"Does the swelling look red or infected?\"      no  5. PAIN: \"Is the swelling painful to touch?\" If Yes, ask: \"How painful is it?\"   (Scale 1-10; mild, moderate or severe)      Just a little achy but no pain  6. FEVER: \"Do you have a fever?\" If Yes, ask: \"What is it, how was it measured, and when did it start?\"       no  7. CAUSE: \"What do you think is causing the leg swelling?\"      unknown  8. MEDICAL HISTORY: \"Do you have a history of blood clots (e.g., DVT), cancer, heart failure, kidney disease, or liver failure?\"      yes  9. RECURRENT SYMPTOM: \"Have you had leg swelling before?\" If Yes, ask: \"When was the last time?\" \"What happened that time?\"      She had a cyst and a blood clot on right leg last time  10. OTHER SYMPTOMS: \"Do you have any other symptoms?\" (e.g., chest pain, difficulty breathing)        no  11. PREGNANCY: \"Is there any chance you are pregnant?\" \"When was your last menstrual period?\"        N/a    Protocols used: Leg Swelling and Edema-A-[place]  Leighann Multani RN    "

## 2024-04-18 ENCOUNTER — OFFICE VISIT (OUTPATIENT)
Dept: ORTHOPEDICS | Facility: CLINIC | Age: 59
End: 2024-04-18
Payer: COMMERCIAL

## 2024-04-18 VITALS — HEIGHT: 63 IN | BODY MASS INDEX: 35.44 KG/M2 | WEIGHT: 200 LBS

## 2024-04-18 DIAGNOSIS — M71.22 BAKER'S CYST OF KNEE, LEFT: Primary | ICD-10-CM

## 2024-04-18 PROCEDURE — 20611 DRAIN/INJ JOINT/BURSA W/US: CPT | Mod: LT | Performed by: FAMILY MEDICINE

## 2024-04-18 RX ORDER — BETAMETHASONE SODIUM PHOSPHATE AND BETAMETHASONE ACETATE 3; 3 MG/ML; MG/ML
6 INJECTION, SUSPENSION INTRA-ARTICULAR; INTRALESIONAL; INTRAMUSCULAR; SOFT TISSUE
Status: SHIPPED | OUTPATIENT
Start: 2024-04-18

## 2024-04-18 RX ORDER — ROPIVACAINE HYDROCHLORIDE 5 MG/ML
1 INJECTION, SOLUTION EPIDURAL; INFILTRATION; PERINEURAL
Status: SHIPPED | OUTPATIENT
Start: 2024-04-18

## 2024-04-18 RX ADMIN — BETAMETHASONE SODIUM PHOSPHATE AND BETAMETHASONE ACETATE 6 MG: 3; 3 INJECTION, SUSPENSION INTRA-ARTICULAR; INTRALESIONAL; INTRAMUSCULAR; SOFT TISSUE at 14:24

## 2024-04-18 RX ADMIN — ROPIVACAINE HYDROCHLORIDE 1 ML: 5 INJECTION, SOLUTION EPIDURAL; INFILTRATION; PERINEURAL at 14:24

## 2024-04-18 ASSESSMENT — PAIN SCALES - GENERAL: PAINLEVEL: MODERATE PAIN (5)

## 2024-04-18 NOTE — PROGRESS NOTES
ASSESSMENT & PLAN    Cindy was seen today for pain and follow up.    Diagnoses and all orders for this visit:    Baker's cyst of knee, left  -     Large Joint Injection/Arthocentesis      # Left Knee Jones's Cyst: Ave Willoughby  was seen today for left posterior knee pain. Symptoms had been going on for a few months. On examination there are positive findings of tenderness to palpation over the posterior knee, pain with end flexion. Imaging findings showed mild knee arthritis. Likely cause of patient's condition due to Baker's cyst 2 cm x 3 cm x 4 cm on ultrasound. Other possible conditions contributing to symptoms include left knee arthritis. She already had steroid injection in knees over the past 1.5 months.  Counseled patient on nature of condition and treatment options.  Given this plan as below, follow-up 1 mon as needed.     Image Findings: mild knee arthritis, Baker's cyst noted on ultrasound  Treatment: Activities as tolerated, home exercises given today  Medications/Injections: Limited tylenol/ibuprofen for pain for 1-2 weeks, Topical Voltaren gel, left knee Baker's cyst aspiration/steroid injection   Follow-up: In one month if symptoms do not improve, sooner if worsening  Can consider repeat evaluation    -----    SUBJECTIVE:  Ave Willoughby is a 58 year old female who is seen in follow-up for left knee pain.They were last seen 2/8/2024 and right knee joint steroid injection and aspiration were performed.  The patient is seen by themselves.    Since their last visit reports persisting posterior knee pain.  Pain with stairs. Saw PCP 4/10/24 for follow up on left knee.  They indicate that their current pain level is 5/10. They have tried right knee joint steroid injection and aspiration, left knee joint steroid injection at [facility] 2/26/24, ice, heat, elevation, bilateral Synvisc injections at [facility] 1/12/24.        Patient's past medical, surgical, social, and family histories were reviewed today and no  "changes are noted.    REVIEW OF SYSTEMS:  Constitutional: NEGATIVE for fever, chills, change in weight  Skin: NEGATIVE for worrisome rashes, moles or lesions  GI/: NEGATIVE for bowel or bladder changes  Neuro: NEGATIVE for weakness, dizziness or paresthesias    OBJECTIVE:  Ht 1.588 m (5' 2.5\")   Wt 90.7 kg (200 lb)   LMP 06/24/2012   BMI 36.00 kg/m     General: healthy, alert and in no distress  HEENT: no scleral icterus or conjunctival erythema  Skin: no suspicious lesions or rash. No jaundice.  CV: regular rhythm by palpation, no pedal edema  Resp: normal respiratory effort without conversational dyspnea   Psych: normal mood and affect  Gait: normal steady gait with appropriate coordination and balance  Neuro: normal light touch sensory exam of the extremities.    MSK:    LEFT KNEE  Inspection:    Normal alignment; no edema, erythema, or ecchymosis present  Palpation:    Tender about the popliteal region. Remainder of bony and ligamentous landmarks are nontender.    No effusion is present    Patellofemoral crepitus is Present  Range of Motion:     00 extension to 1350 flexion pan with end flexion  Strength:    Quadriceps 5/5, hamstrings 5/5, gastrocsoleus 5/5, and tibialis anterior 5/5    Extensor mechanism intact  Special Tests:    Positive: None    Negative: Patellar grind, MCL/valgus stress (0 & 30 deg), LCL/varus stress (0 & 30 deg)    Independent visualization of the below image:  XR KNEE STANDING AP SUNRISE BILAT LAT RIGHT   1/29/2024 10:25 AM      HISTORY: Baker's cyst of knee, right  COMPARISON: None.                                                                       IMPRESSION:      RIGHT KNEE: No acute fracture or dislocation. There is  mild-to-moderate medial and patellofemoral compartment degenerative  arthrosis. There is a joint effusion with a few intra-articular loose  bodies posterior to the proximal tibia measuring up to 9 mm.     LEFT KNEE: Frontal and sunrise views of the left knee " demonstrate  tricompartmental degenerative arthrosis most pronounced in the  patellofemoral compartment where it appears moderate to advanced.     MD Aaron LOPEZ MD, [facility]    Disclaimer: This note consists of symbols derived from keyboarding, dictation and/or voice recognition software. As a result, there may be errors in the script that have gone undetected. Please consider this when interpreting information found in this chart.    Large Joint Injection/Arthocentesis    Date/Time: 4/18/2024 2:24 PM    Performed by: Aaron James MD  Authorized by: Aaron James MD    Indications:  Pain  Needle Size:  21 G  Guidance: ultrasound    Approach:  Posterior  Location:  Knee   Location comment:  Left knee Baker's Cyst       Medications:  6 mg betamethasone acet & sod phos 6 (3-3) MG/ML; 1 mL ROPivacaine 5 MG/ML  Aspirate amount (mL):  15  Aspirate:  Serous and yellow  Outcome:  Tolerated well, no immediate complications  Procedure discussed: discussed risks, benefits, and alternatives    Consent Given by:  Patient  Timeout: timeout called immediately prior to procedure    Prep: patient was prepped and draped in usual sterile fashion     Ultrasound images of procedure were permanently stored.     Patient reported improvement of pain after the numbing portion left knee Baker's cyst aspiration/steroid injection.  Ultrasound guided images were permanently stored.   Aftercare instructions given to patient.  Plan to follow-up as discussed above.     Aaron James MD [facility]

## 2024-04-18 NOTE — PATIENT INSTRUCTIONS
# Left Knee Jones's Cyst: Ave Willoughby  was seen today for left posterior knee pain. Symptoms had been going on for a few months. On examination there are positive findings of tenderness to palpation over the posterior knee, pain with end flexion. Imaging findings showed mild knee arthritis. Likely cause of patient's condition due to Baker's cyst 2 cm x 3 cm x 4 cm on ultrasound. Other possible conditions contributing to symptoms include left knee arthritis. She already had steroid injection in knees over the past 1.5 months.  Counseled patient on nature of condition and treatment options.  Given this plan as below, follow-up 1 mon as needed.     Image Findings: mild knee arthritis, Baker's cyst noted on ultrasound  Treatment: Activities as tolerated, home exercises given today  Medications/Injections: Limited tylenol/ibuprofen for pain for 1-2 weeks, Topical Voltaren gel, left knee Baker's cyst aspiration/steroid injection   Follow-up: In one month if symptoms do not improve, sooner if worsening  Can consider repeat evaluation    Please call [phone]   Ask for my team if you have any questions or concerns    If you have not yet received the influenza vaccine but would like to get one, please call  [phone] or you can schedule via [employer]    It was great seeing you again today!    Aaron James MD, [facility] Injection Discharge Instructions    Procedure: left Baker's cyst aspiration/steroid injection     You may shower, however avoid swimming, tub baths or hot tubs for 24 hours following your procedure  You may have a mild to moderate increase in pain for several days following the injection.  It may take up to 14 days for the steroid medication to start working although you may feel the effect as early as a few days after the procedure.  You may use ice packs for 10-15 minutes, 3 to 4 times a day at the injection site for comfort  You may use anti-inflammatory medications (such as  Ibuprofen or Aleve or Advil) or Tylenol for pain control if necessary  If you were fasting, you may resume your normal diet and medications after the procedure  If you have diabetes, check your blood sugar more frequently than usual as your blood sugar may be higher than normal for 10-14 days following a steroid injection. Contact your doctor who manages your diabetes if your blood sugar is higher than usual    If you experience any of the following, call [facility] @ [phone] or [phone]  -Fever over 100 degree F  -Swelling, bleeding, redness, drainage, warmth at the injection site  - New or worsening pain

## 2024-04-18 NOTE — LETTER
4/18/2024         RE: Ave Willoughby  [address] [facility] [id number]        Dear Colleague,    Thank you for referring your patient, Ave Willoughby, to the [facility] [place]. Please see a copy of my visit note below.    ASSESSMENT & PLAN    Cindy was seen today for pain and follow up.    Diagnoses and all orders for this visit:    Baker's cyst of knee, left  -     Large Joint Injection/Arthocentesis      # Left Knee Jones's Cyst: Ave Willoughby  was seen today for left posterior knee pain. Symptoms had been going on for a few months. On examination there are positive findings of tenderness to palpation over the posterior knee, pain with end flexion. Imaging findings showed mild knee arthritis. Likely cause of patient's condition due to Baker's cyst 2 cm x 3 cm x 4 cm on ultrasound. Other possible conditions contributing to symptoms include left knee arthritis. She already had steroid injection in knees over the past 1.5 months.  Counseled patient on nature of condition and treatment options.  Given this plan as below, follow-up 1 mon as needed.     Image Findings: mild knee arthritis, Baker's cyst noted on ultrasound  Treatment: Activities as tolerated, home exercises given today  Medications/Injections: Limited tylenol/ibuprofen for pain for 1-2 weeks, Topical Voltaren gel, left knee Baker's cyst aspiration/steroid injection   Follow-up: In one month if symptoms do not improve, sooner if worsening  Can consider repeat evaluation    -----    SUBJECTIVE:  Ave Willoughby is a 58 year old female who is seen in follow-up for left knee pain.They were last seen 2/8/2024 and right knee joint steroid injection and aspiration were performed.  The patient is seen by themselves.    Since their last visit reports persisting posterior knee pain.  Pain with stairs. Saw PCP 4/10/24 for follow up on left knee.  They indicate that their current pain level is 5/10. They have  "tried right knee joint steroid injection and aspiration, left knee joint steroid injection at [facility] 2/26/24, ice, heat, elevation, bilateral Synvisc injections at [place] 1/12/24.        Patient's past medical, surgical, social, and family histories were reviewed today and no changes are noted.    REVIEW OF SYSTEMS:  Constitutional: NEGATIVE for fever, chills, change in weight  Skin: NEGATIVE for worrisome rashes, moles or lesions  GI/: NEGATIVE for bowel or bladder changes  Neuro: NEGATIVE for weakness, dizziness or paresthesias    OBJECTIVE:  Ht 1.588 m (5' 2.5\")   Wt 90.7 kg (200 lb)   LMP 06/24/2012   BMI 36.00 kg/m     General: healthy, alert and in no distress  HEENT: no scleral icterus or conjunctival erythema  Skin: no suspicious lesions or rash. No jaundice.  CV: regular rhythm by palpation, no pedal edema  Resp: normal respiratory effort without conversational dyspnea   Psych: normal mood and affect  Gait: normal steady gait with appropriate coordination and balance  Neuro: normal light touch sensory exam of the extremities.    MSK:    LEFT KNEE  Inspection:    Normal alignment; no edema, erythema, or ecchymosis present  Palpation:    Tender about the popliteal region. Remainder of bony and ligamentous landmarks are nontender.    No effusion is present    Patellofemoral crepitus is Present  Range of Motion:     00 extension to 1350 flexion pan with end flexion  Strength:    Quadriceps 5/5, hamstrings 5/5, gastrocsoleus 5/5, and tibialis anterior 5/5    Extensor mechanism intact  Special Tests:    Positive: None    Negative: Patellar grind, MCL/valgus stress (0 & 30 deg), LCL/varus stress (0 & 30 deg)    Independent visualization of the below image:  XR KNEE STANDING AP SUNRISE BILAT LAT RIGHT   1/29/2024 10:25 AM      HISTORY: Baker's cyst of knee, right  COMPARISON: None.                                                                       IMPRESSION:      RIGHT KNEE: No acute fracture or dislocation. " There is  mild-to-moderate medial and patellofemoral compartment degenerative  arthrosis. There is a joint effusion with a few intra-articular loose  bodies posterior to the proximal tibia measuring up to 9 mm.     LEFT KNEE: Frontal and sunrise views of the left knee demonstrate  tricompartmental degenerative arthrosis most pronounced in the  patellofemoral compartment where it appears moderate to advanced.     MD Aaron LOPEZ MD, [facility]    Disclaimer: This note consists of symbols derived from keyboarding, dictation and/or voice recognition software. As a result, there may be errors in the script that have gone undetected. Please consider this when interpreting information found in this chart.    Large Joint Injection/Arthocentesis    Date/Time: 4/18/2024 2:24 PM    Performed by: Aaron James MD  Authorized by: Aaron James MD    Indications:  Pain  Needle Size:  21 G  Guidance: ultrasound    Approach:  Posterior  Location:  Knee   Location comment:  Left knee Baker's Cyst       Medications:  6 mg betamethasone acet & sod phos 6 (3-3) MG/ML; 1 mL ROPivacaine 5 MG/ML  Aspirate amount (mL):  15  Aspirate:  Serous and yellow  Outcome:  Tolerated well, no immediate complications  Procedure discussed: discussed risks, benefits, and alternatives    Consent Given by:  Patient  Timeout: timeout called immediately prior to procedure    Prep: patient was prepped and draped in usual sterile fashion     Ultrasound images of procedure were permanently stored.     Patient reported improvement of pain after the numbing portion left knee Baker's cyst aspiration/steroid injection.  Ultrasound guided images were permanently stored.   Aftercare instructions given to patient.  Plan to follow-up as discussed above.     Aaron James MD [facility]            Again, thank you for allowing me to participate in the care of your  patient.        Sincerely,        Aaron James MD

## 2024-04-22 ENCOUNTER — TRANSFERRED RECORDS (OUTPATIENT)
Dept: HEALTH INFORMATION MANAGEMENT | Facility: CLINIC | Age: 59
End: 2024-04-22
Payer: COMMERCIAL

## 2024-04-26 ENCOUNTER — OFFICE VISIT (OUTPATIENT)
Dept: VASCULAR SURGERY | Facility: CLINIC | Age: 59
End: 2024-04-26
Payer: COMMERCIAL

## 2024-04-26 ENCOUNTER — ANCILLARY PROCEDURE (OUTPATIENT)
Dept: ULTRASOUND IMAGING | Facility: CLINIC | Age: 59
End: 2024-04-26
Attending: HOSPITALIST
Payer: COMMERCIAL

## 2024-04-26 DIAGNOSIS — Z86.718 HISTORY OF DVT OF LOWER EXTREMITY: Primary | ICD-10-CM

## 2024-04-26 DIAGNOSIS — I82.441 ACUTE DEEP VEIN THROMBOSIS (DVT) OF RIGHT TIBIAL VEIN (H): ICD-10-CM

## 2024-04-26 PROCEDURE — 93970 EXTREMITY STUDY: CPT | Mod: GC | Performed by: RADIOLOGY

## 2024-04-26 PROCEDURE — 99213 OFFICE O/P EST LOW 20 MIN: CPT | Performed by: HOSPITALIST

## 2024-04-26 NOTE — PATIENT INSTRUCTIONS
Thank you so much for choosing us for your care. It was a pleasure to see you at the vascular clinic today.     Follow-up recommendations:   - Discontinue eliquis  - Follow up based on results of labs. For sure in one year.     Additional testing/imaging ordered today: lab draw in about 1 month.       Our scheduling team will get in touch with you to set up any follow-up testing/imaging and/or appointments. Please be aware that any testing/imaging recommended today will need to completed prior to your next visit with the provider. If testing/imaging is not completed prior to your next visit, your visit may be rescheduled.     If you have any questions, please contact our clinic directly at [phone] and ask for the nurse. We also encourage the use of [employer] to communicate with your healthcare provider.    If you have an urgent need after business hours (8:00 am to 4:30 pm) please call [phone], option 4, and ask for the vascular attending on call. For non-urgent after hours needs, please call the vascular clinic at [phone]. For scheduling needs, please call our clinic directly at [phone].    =====================================================================    [facility] is recognized by the [facility] as a comprehensive stroke center. As part of our commitment to better patient outcomes and excellent stroke education, we attach the below stroke education materials to ALL of the after visit summaries in our vascular clinic.        Learning About BE FAST: Stroke Warning Signs  BE FAST is a simple way to remember the main symptoms of stroke. These symptoms happen suddenly. So learning what to look for helps you know when to call for medical help. BE FAST stands for:    B - Balance.  Loss of balance or trouble walking.     E - Eyes.  Trouble seeing out of one or both eyes.     F - Face.  Weakness or drooping on one side of the face.     A - Arm.  Weakness or  numbness in an arm or leg.     S - Speech.  Trouble speaking.     T - Time to call 911.  Also call 911 if you have other stroke symptoms. They include:  Sudden confusion.  Sudden trouble understanding simple statements.  Fainting.  A seizure.  A sudden, severe headache.     A stroke happens when a blood vessel in the brain bursts or is blocked by a blood clot. The blood supply to part of the brain--and the oxygen the blood carries--is reduced. This damages the brain.   If you have a stroke, quick treatment may save your life. And it may reduce the damage in your brain so that you have fewer problems after the stroke.   Current as of: December 18, 2022               Content Version: 13.8    1530-9889 [employer].   Care instructions adapted under license by your healthcare professional. If you have questions about a medical condition or this instruction, always ask your healthcare professional. [employer] disclaims any warranty or liability for your use of this information.    Learning About How to Prevent a Stroke  What is a stroke?  A stroke is damage to the brain that occurs when a blood vessel in the brain bursts or is blocked by a blood clot. Without blood and the oxygen it carries, part of the brain starts to die. The part of the body controlled by the damaged area of the brain can't work properly.  Brain damage can start within minutes of a stroke. But quick treatment can help limit the damage and increase the chance of a full recovery.  What puts you at risk for stroke?  A risk factor is anything that makes you more likely to have a particular health problem.  Risk factors for stroke that you can manage or change include:  Health problems like atrial fibrillation, diabetes, high blood pressure, high cholesterol, hardening of the arteries (atherosclerosis), and sickle cell disease.  Smoking.  Drinking more than 2 alcoholic drinks a day for men and 1 drink a day for women.  Being  overweight.  Not eating healthy foods.  Not getting enough physical activity.  Risk factors you can't change include:  Having a previous stroke.  Family history of stroke.  Being older.  Being , [place] Native, , or South  American.  Being female.  Having certain problems during pregnancy, such as preeclampsia.  Being past menopause.  Your doctor can help you know your risk. Then you and your doctor can talk about whether to take steps to lower it.  How can you help prevent a stroke?  Here are some things you can do to help prevent a stroke.  Manage health problems that raise your risk. These include atrial fibrillation, diabetes, high blood pressure, and high cholesterol.  Have a heart-healthy lifestyle.  Don't smoke. If you need help quitting, talk to your doctor about stop-smoking programs and medicines. These can increase your chances of quitting for good.  Limit alcohol to 2 drinks a day for men and 1 drink a day for women.  Stay at a healthy weight. Lose weight if you need to.  Be active. Get at least 30 minutes of exercise on most days of the week. Walking is a good choice. You also may want to do other activities, such as running, swimming, cycling, or playing tennis or team sports.  Eat heart-healthy foods. These include vegetables, fruits, nuts, beans, lean meat, fish, and whole grains. Limit sodium and sugar.  If you think you may have a problem with alcohol or drug use, talk to your doctor.  If you use hormone therapy for menopause or hormonal birth control, talk with your doctor. Ask if these are right for you. They may raise the risk of stroke in some people.  Decide with your doctor whether you will also take medicines to help lower your risk. For example, you and your doctor may decide you will take a medicine that prevents blood clots.  What are the symptoms of a stroke?  Symptoms of a stroke happen quickly. A stroke may cause:  Sudden numbness, tingling,  weakness, or loss of movement in your face, arm, or leg, especially on only one side of your body.  Sudden vision changes.  Sudden trouble speaking.  Sudden confusion or trouble understanding simple statements.  Sudden problems with walking or balance.  A sudden, severe headache that is different from past headaches.  Fainting.  A seizure.  It's important to call for medical help if you have stroke symptoms. Quick treatment may save your life. And it may reduce the damage in your brain so that you have fewer problems after the stroke.  Follow-up care is a key part of your treatment and safety. Be sure to make and go to all appointments, and call your doctor if you are having problems. It's also a good idea to know your test results and keep a list of the medicines you take.    Current as of: December 18, 2022               Content Version: 13.8    [id number] [employer].   Care instructions adapted under license by your healthcare professional. If you have questions about a medical condition or this instruction, always ask your healthcare professional. [employer] disclaims any warranty or liability for your use of this information.

## 2024-04-26 NOTE — PROGRESS NOTES
VASCULAR MEDICINE PROGRESS NOTE          LOCATION:  [facility]        Date of Service: 4/26/2024      Primary Care Provider: Roma Babcock      Reason for the visit/chief complaint:   Follow up on right distal DVT      Subjective:  Ave Willoughby is a pleasant 58 year old female who presents to our Vascular Medicine clinic to follow-up.  We first met 1/26/2024 when she was first diagnosed with right distal posterior tibial DVT.  This was presumably unprovoked.  We then started her on anticoagulation with apixaban that day.  She is here today for her 3-month follow-up.    Ms. Willoughby reports that she has been feeling well.  No issues with leg pain or cramps.  She continues to have issues with left knee.  Continues to be active and different sports such as yoga, swimming, biking, walking.    Doing well on apixaban.  No bleeding consequences.    She had discussion with her sister and found out that her sister never developed DVT she only has venous insufficiency and varicose veins.  She wanted us to know this.    Past medical history, surgical history, medications, family history, social history and allergies were reviewed. Pertinent points mentioned under HPI.        OBJECTIVE:    Vital signs:  LMP 06/24/2012   Wt Readings from Last 1 Encounters:   04/18/24 200 lb     There is no height or weight on file to calculate BMI.    Physical exam:  General appearance: Pleasant female in no apparent distress.    Extremities: No swelling noted on bilateral lower extremities.  No tenderness.  Normal skin color.  No stasis dermatitis, lipodermatosclerosis or skin wounds.      DIAGNOSTIC STUDIES:   Labs and diagnostics reviewed including outside records. Pertinent points are mentioned under HPI and assessment and plan sections.        ASSESSMENT AND PLAN:    Right distal DVT in the posterior tibial vein 1/23/2024  Obesity class II BMI 36.8  Bilateral  Jones's cyst    Ms. Willoughby is doing well  With no leg pain or swelling.  No issues with apixaban.  She completed 3 months of anticoagulation this morning (apixaban was started the evening of 1/26).  Had a repeat venous duplex ultrasound prior to our visit today that shows resolution of her previous right distal DVT and no new DVTs.    Last visit, we ruled out APS which came back negative.  We again discussed that although her DVT could to be unprovoked, there is also the possibility with large Baker's cyst causing leg veins to have more stasis that could predispose to clots.  Overall and given the very small burden of her previous blood clot, we will continue with our previous plan of discontinuing anticoagulation at 3 months aleksander and continue with serial D-dimers.  Her baseline D-dimer at the time of her DVT diagnosis was 2.2.  Will plan on obtaining another D-dimer after 1 month of anticoagulation discontinuation.    As directed above, she now denies any family history of DVT.  Her sister actually has venous insufficiency and varicose veins without DVT.    She is aware of the low MCV and MCH seen on her CBC we ordered last visit and will work with PCP to assess iron deficiency.      Recommendations:  Stop apixaban.  Obtain D-dimer in 1 months expected around 5/26.  Will be in touch with the patient once her D-dimer is resulted.  If continues to have low D-dimers, we will continue off anticoagulation and continue serial testing next will be in 3 months and then 6 months then annually.  Otherwise, we discussed that if D-dimer of anticoagulation is elevated, we would recommend preventative dose anticoagulation long-term with periodic evaluation.  Discussed as needed use of compression stockings.  Encouraged to remain active as she has been.    Pleasure again meeting Ms. Willoughby in our clinic.    Irma Steel MD  Vascular Medicine  April 26, 2024      ADDENDUM June 2024:  D-dimer came back normal for age 0.49, 1 month  of anticoagulation discontinuation.  If the apply HERDOO2 score, she will score 2 for her BMI being above 30 and her D-dimer being above specifically 0.25 this being said,.  This score needs more validation and patient is above age 50.  On the other hand, her RCT the PROLONG trial and multiple prospective studies, and normal D-dimer for age is consistent with lower risk of recurrent DVT.  Given her D-dimer is considered low for her age, we will remain off anticoagulation and repeat serial D-dimer expected in 6 months around November 30-beginning of December.    [email or web address] message was sent to the patient.  I did put in the D-dimer test to be completed in 6 months and we will communicate the results with her when available.    Irma Steel MD  Vascular Medicine

## 2024-04-26 NOTE — LETTER
4/26/2024       RE: Ave Willoughby  [address] [facility] [id number]       Dear Colleague,    Thank you for referring your patient, Ave Willoughby, to the [facility]ASCULAR [facility] [place] at [facility]. Please see a copy of my visit note below.    VASCULAR MEDICINE PROGRESS NOTE          LOCATION:  [facility]    Date of Service: 4/26/2024    Primary Care Provider: Roma Babcock    Reason for the visit/chief complaint:   Follow up on right distal DVT    Subjective:  Ave Willoughby is a pleasant 58 year old female who presents to our Vascular Medicine clinic to follow-up.  We first met 1/26/2024 when she was first diagnosed with right distal posterior tibial DVT.  This was presumably unprovoked.  We then started her on anticoagulation with apixaban that day.  She is here today for her 3-month follow-up.    Ms. Willoughby reports that she has been feeling well.  No issues with leg pain or cramps.  She continues to have issues with left knee.  Continues to be active and different sports such as yoga, swimming, biking, walking.    Doing well on apixaban.  No bleeding consequences.    She had discussion with her sister and found out that her sister never developed DVT she only has venous insufficiency and varicose veins.  She wanted us to know this.    Past medical history, surgical history, medications, family history, social history and allergies were reviewed. Pertinent points mentioned under HPI.        OBJECTIVE:    Vital signs:  LMP 06/24/2012   Wt Readings from Last 1 Encounters:   04/18/24 200 lb     There is no height or weight on file to calculate BMI.    Physical exam:  General appearance: Pleasant female in no apparent distress.    Extremities: No swelling noted on bilateral lower extremities.  No tenderness.  Normal skin color.  No stasis dermatitis, lipodermatosclerosis  or skin wounds.      DIAGNOSTIC STUDIES:   Labs and diagnostics reviewed including outside records. Pertinent points are mentioned under HPI and assessment and plan sections.        ASSESSMENT AND PLAN:    Right distal DVT in the posterior tibial vein 1/23/2024  Obesity class II BMI 36.8  Bilateral Baker's cyst    Ms. Willoughby is doing well  With no leg pain or swelling.  No issues with apixaban.  She completed 3 months of anticoagulation this morning (apixaban was started the evening of 1/26).  Had a repeat venous duplex ultrasound prior to our visit today that shows resolution of her previous right distal DVT and no new DVTs.    Last visit, we ruled out APS which came back negative.  We again discussed that although her DVT could to be unprovoked, there is also the possibility with large Baker's cyst causing leg veins to have more stasis that could predispose to clots.  Overall and given the very small burden of her previous blood clot, we will continue with our previous plan of discontinuing anticoagulation at 3 months aleksander and continue with serial D-dimers.  Her baseline D-dimer at the time of her DVT diagnosis was 2.2.  Will plan on obtaining another D-dimer after 1 month of anticoagulation discontinuation.    As directed above, she now denies any family history of DVT.  Her sister actually has venous insufficiency and varicose veins without DVT.    She is aware of the low MCV and MCH seen on her CBC we ordered last visit and will work with PCP to assess iron deficiency.      Recommendations:  Stop apixaban.  Obtain D-dimer in 1 months expected around 5/26.  Will be in touch with the patient once her D-dimer is resulted.  If continues to have low D-dimers, we will continue off anticoagulation and continue serial testing next will be in 3 months and then 6 months then annually.  Otherwise, we discussed that if D-dimer of anticoagulation is elevated, we would recommend preventative dose anticoagulation long-term  with periodic evaluation.  Discussed as needed use of compression stockings.  Encouraged to remain active as she has been.    Pleasure again meeting Ms. Willoughby in our clinic.        Again, thank you for allowing me to participate in the care of your patient.      Sincerely,    Irma Steel MD

## 2024-05-30 ENCOUNTER — LAB (OUTPATIENT)
Dept: LAB | Facility: CLINIC | Age: 59
End: 2024-05-30
Payer: COMMERCIAL

## 2024-05-30 DIAGNOSIS — Z86.718 HISTORY OF DVT OF LOWER EXTREMITY: ICD-10-CM

## 2024-05-30 LAB — D DIMER PPP FEU-MCNC: 0.42 UG/ML FEU (ref 0–0.5)

## 2024-05-30 PROCEDURE — 36415 COLL VENOUS BLD VENIPUNCTURE: CPT

## 2024-05-30 PROCEDURE — 85379 FIBRIN DEGRADATION QUANT: CPT

## 2024-07-08 ENCOUNTER — TRANSFERRED RECORDS (OUTPATIENT)
Dept: HEALTH INFORMATION MANAGEMENT | Facility: CLINIC | Age: 59
End: 2024-07-08
Payer: COMMERCIAL

## 2024-09-05 ENCOUNTER — TRANSFERRED RECORDS (OUTPATIENT)
Dept: HEALTH INFORMATION MANAGEMENT | Facility: CLINIC | Age: 59
End: 2024-09-05

## 2024-09-05 ENCOUNTER — OFFICE VISIT (OUTPATIENT)
Dept: URGENT CARE | Facility: URGENT CARE | Age: 59
End: 2024-09-05
Payer: COMMERCIAL

## 2024-09-05 VITALS
SYSTOLIC BLOOD PRESSURE: 138 MMHG | RESPIRATION RATE: 14 BRPM | DIASTOLIC BLOOD PRESSURE: 82 MMHG | TEMPERATURE: 98.5 F | BODY MASS INDEX: 32.4 KG/M2 | OXYGEN SATURATION: 97 % | HEART RATE: 78 BPM | WEIGHT: 180 LBS

## 2024-09-05 DIAGNOSIS — R07.0 THROAT PAIN: ICD-10-CM

## 2024-09-05 DIAGNOSIS — J02.9 VIRAL PHARYNGITIS: Primary | ICD-10-CM

## 2024-09-05 LAB
DEPRECATED S PYO AG THROAT QL EIA: NEGATIVE
GROUP A STREP BY PCR: NOT DETECTED

## 2024-09-05 PROCEDURE — 99213 OFFICE O/P EST LOW 20 MIN: CPT | Performed by: STUDENT IN AN ORGANIZED HEALTH CARE EDUCATION/TRAINING PROGRAM

## 2024-09-05 PROCEDURE — 87635 SARS-COV-2 COVID-19 AMP PRB: CPT | Performed by: STUDENT IN AN ORGANIZED HEALTH CARE EDUCATION/TRAINING PROGRAM

## 2024-09-05 PROCEDURE — 87651 STREP A DNA AMP PROBE: CPT | Performed by: STUDENT IN AN ORGANIZED HEALTH CARE EDUCATION/TRAINING PROGRAM

## 2024-09-05 ASSESSMENT — PAIN SCALES - GENERAL: PAINLEVEL: MODERATE PAIN (4)

## 2024-09-05 NOTE — PROGRESS NOTES
Assessment & Plan     Viral pharyngitis  Lab test rapid strep negative. Awaiting strep PCR result and will treat with antibiotic if this comes back positive. Covid test in process. I advised treating viral pharyngitis with supportive measures of rest, pushing fluids to avoid dehydration, OTC Tylenol or ibuprofen as needed per label directions for pain or fever. Discussed that symptoms of viral illnesses tend to be worst on days 2-4 then gradually improve over the course of 7-10 days. We also discussed that if the symptoms persist, I recommended patient is seen again by a provider either in urgent care or in family practice.     Throat pain  - Streptococcus A Rapid Screen w/Reflex to PCR - Clinic Collect  - Group A Streptococcus PCR Throat Swab  - Symptomatic COVID-19 Virus (Coronavirus) by PCR Nasopharyngeal         No follow-ups on file.    ALINE Guerrero [facility] White is a 59 year old female who presents to clinic today for the following health issues:  Chief Complaint   Patient presents with    Urgent Care    Throat Problem     X's 3 days, neg to covid earlier today          9/5/2024     2:32 PM   Additional Questions   Roomed by ca     HPI          Review of Systems  Constitutional, HEENT, cardiovascular, pulmonary, GI, , musculoskeletal, neuro, skin, endocrine and psych systems are negative, except as otherwise noted.      Objective    /82   Pulse 78   Temp 98.5  F (36.9  C) (Tympanic)   Resp 14   Wt 81.6 kg (180 lb)   LMP 06/24/2012   SpO2 97%   BMI 32.40 kg/m    Physical Exam   GENERAL: alert and no distress  EYES: Eyes grossly normal to inspection, PERRL and conjunctivae and sclerae normal  HENT: ear canals and TM's normal, nose and mouth without ulcers or lesions  NECK: left anterior cervical lymph node x 1 that is mildly tender, no asymmetry, masses, or scars  RESP: lungs clear to auscultation - no rales, rhonchi or wheezes  CV:  regular rate and rhythm, normal S1 S2, no S3 or S4, no murmur, click or rub, no peripheral edema  MS: no gross musculoskeletal defects noted, no edema  SKIN: no suspicious lesions or rashes  NEURO: Normal strength and tone, mentation intact and speech normal  PSYCH: mentation appears normal, affect normal/bright    Results for orders placed or performed in visit on 09/05/24 (from the past 24 hour(s))   Streptococcus A Rapid Screen w/Reflex to PCR - Clinic Collect    Specimen: Throat; Swab   Result Value Ref Range    Group A Strep antigen Negative Negative

## 2024-09-06 LAB — SARS-COV-2 RNA RESP QL NAA+PROBE: POSITIVE

## 2024-10-01 ENCOUNTER — OFFICE VISIT (OUTPATIENT)
Dept: ORTHOPEDICS | Facility: CLINIC | Age: 59
End: 2024-10-01
Payer: COMMERCIAL

## 2024-10-01 VITALS — RESPIRATION RATE: 18 BRPM | HEIGHT: 63 IN | BODY MASS INDEX: 31.89 KG/M2 | WEIGHT: 180 LBS | HEART RATE: 78 BPM

## 2024-10-01 DIAGNOSIS — M71.22 BAKER'S CYST OF KNEE, LEFT: Primary | ICD-10-CM

## 2024-10-01 PROCEDURE — 20611 DRAIN/INJ JOINT/BURSA W/US: CPT | Mod: LT | Performed by: FAMILY MEDICINE

## 2024-10-01 RX ADMIN — BETAMETHASONE SODIUM PHOSPHATE AND BETAMETHASONE ACETATE 6 MG: 3; 3 INJECTION, SUSPENSION INTRA-ARTICULAR; INTRALESIONAL; INTRAMUSCULAR; SOFT TISSUE at 16:01

## 2024-10-01 RX ADMIN — ROPIVACAINE HYDROCHLORIDE 2 ML: 5 INJECTION, SOLUTION EPIDURAL; INFILTRATION; PERINEURAL at 16:01

## 2024-10-01 ASSESSMENT — PAIN SCALES - GENERAL: PAINLEVEL: MILD PAIN (3)

## 2024-10-01 NOTE — PROGRESS NOTES
ASSESSMENT & PLAN    Cindy was seen today for pain and follow up.    Diagnoses and all orders for this visit:    Baker's cyst of knee, left  -     Large Joint Injection/Arthocentesis      This issue is acute on chronic and Worsening.    # Left Knee Jones's Cyst: Ave Willoughby  was seen today for left posterior knee pain. Symptoms had been going on for 6+ mon. On examination there are positive findings of tenderness to palpation over the posterior knee, pain with end flexion. Imaging findings showed mild knee arthritis. Likely cause of patient's condition due to Baker's cyst last aspirated 4/18/24. Other possible conditions contributing to symptoms include left knee arthritis. Previous aspiration helped for 3+ mon. Counseled patient on nature of condition and treatment options.  Given this plan as below, follow-up 1 mon as needed.     Image Findings: mild knee arthritis, Baker's cyst noted on ultrasound  Treatment: Activities as tolerated, home exercises given today  Medications/Injections: Limited tylenol/ibuprofen for pain for 1-2 weeks, Topical Voltaren gel, repeat left knee Baker's cyst aspiration/steroid injection   Follow-up: In one month if symptoms do not improve, sooner if worsening  Can consider repeat evaluation  Aaron James MD  [facility] [place]    -----  Chief Complaint   Patient presents with    Left Knee - Pain, Follow Up       SUBJECTIVE  Ave Willoughby is a/an 59 year old female who is seen in follow-up for evaluation of left posterior knee pain.     The patient is seen by themselves.     Onset: 8 month(s) ago. Reports insidious onset without acute precipitating event.  Location of Pain: left knee  Worsened by: going up and down stairs, prolonged sitting, getting into and out of the car  Better with: movement, injection and aspiration  Treatments tried: left knee injection and aspiration on 4/18/24  Associated symptoms: no distal numbness or tingling; denies  "swelling or warmth. tightness    Orthopedic/Surgical history: NO     No family history pertinent to patient's problem today.      REVIEW OF SYSTEMS:  Review of Systems  Constitutional, HEENT, cardiovascular, pulmonary, gi and gu systems are negative, except as otherwise noted.    OBJECTIVE:  Pulse 78   Resp 18   Ht 1.6 m (5' 3\")   Wt 81.6 kg (180 lb)   LMP 06/24/2012   BMI 31.89 kg/m     General: healthy, alert and in no distress  HEENT: no scleral icterus or conjunctival erythema  Skin: no suspicious lesions or rash. No jaundice.  CV: distal perfusion intact    Resp: normal respiratory effort without conversational dyspnea   Psych: normal mood and affect  Gait: normal steady gait with appropriate coordination and balance    Neuro: Normal light sensory exam of left lower extremity     Ortho Exam   LEFT KNEE  Inspection:    Normal alignment; no edema, erythema, or ecchymosis present  Palpation:    Tender about the popliteal region. Remainder of bony and ligamentous landmarks are nontender.    No effusion is present    Patellofemoral crepitus is Absent  Range of Motion:     00 extension to 1350 flexion  Strength:    Quadriceps 5/5, hamstrings 5/5, gastrocsoleus 5/5, and tibialis anterior 5/5    Extensor mechanism intact  Special Tests:    Positive: None    Negative: Patellar grind, MCL/valgus stress (0 & 30 deg), LCL/varus stress (0 & 30 deg), Lachman's    RADIOLOGY:  I independently, visualized and reviewed these images with the patient    Reviewed previous ultrasound      Review of external notes as documented elsewhere in note  Review of the result(s) of each unique test - left lower leg ultrasound       Disclaimer: This note consists of symbols derived from keyboarding, dictation and/or voice recognition software. As a result, there may be errors in the script that have gone undetected. Please consider this when interpreting information found in this chart.      Large Joint " Injection/Arthocentesis    Date/Time: 10/1/2024 4:01 PM    Performed by: Aaron James MD  Authorized by: Aaron James MD    Indications:  Pain  Needle Size:  18 G  Guidance: ultrasound    Approach:  Posterior  Location:  Knee   Location comment:  Left bakers cyst       Medications:  6 mg betamethasone acet & sod phos 6 (3-3) MG/ML; 2 mL ROPivacaine 5 MG/ML  Aspirate amount (mL):  15  Aspirate:  Serous and yellow  Outcome:  Tolerated well, no immediate complications  Procedure discussed: discussed risks, benefits, and alternatives    Consent Given by:  Patient  Timeout: timeout called immediately prior to procedure    Prep: patient was prepped and draped in usual sterile fashion     Ultrasound images of procedure were permanently stored.     Patient reported some improvement of pain after the numbing portion left Baker's cyst aspiration/steroid injection.  Ultrasound guided images were permanently stored.   Aftercare instructions given to patient.  Plan to follow-up as discussed above.     Aaron James MD [facility]

## 2024-10-01 NOTE — PATIENT INSTRUCTIONS
# Left Knee Jones's Cyst: Ave Willoughby  was seen today for left posterior knee pain. Symptoms had been going on for 6+ mon. On examination there are positive findings of tenderness to palpation over the posterior knee, pain with end flexion. Imaging findings showed mild knee arthritis. Likely cause of patient's condition due to Baker's cyst last aspirated 4/18/24. Other possible conditions contributing to symptoms include left knee arthritis. Previous aspiration helped for 3+ mon. Counseled patient on nature of condition and treatment options.  Given this plan as below, follow-up 1 mon as needed.     Image Findings: mild knee arthritis, Baker's cyst noted on ultrasound  Treatment: Activities as tolerated, home exercises given today  Medications/Injections: Limited tylenol/ibuprofen for pain for 1-2 weeks, Topical Voltaren gel, repeat left knee Baker's cyst aspiration/steroid injection   Follow-up: In one month if symptoms do not improve, sooner if worsening  Can consider repeat evaluation    Please call [phone]   Ask for my team if you have any questions or concerns    If you have not yet received the influenza vaccine but would like to get one, please call  [phone] or you can schedule via [employer]    It was great seeing you again today!    Aaron James MD, [facility] Injection Discharge Instructions    Procedure: Left knee Baker's cyst aspiration/steroid injection    You may shower, however avoid swimming, tub baths or hot tubs for 24 hours following your procedure  You may have a mild to moderate increase in pain for several days following the injection.  It may take up to 14 days for the steroid medication to start working although you may feel the effect as early as a few days after the procedure.  You may use ice packs for 10-15 minutes, 3 to 4 times a day at the injection site for comfort  You may use anti-inflammatory medications (such as Ibuprofen or Aleve or Advil) or Tylenol for  pain control if necessary  If you were fasting, you may resume your normal diet and medications after the procedure  If you have diabetes, check your blood sugar more frequently than usual as your blood sugar may be higher than normal for 10-14 days following a steroid injection. Contact your doctor who manages your diabetes if your blood sugar is higher than usual    If you experience any of the following, call [facility] @ [phone] or [phone]  -Fever over 100 degree F  -Swelling, bleeding, redness, drainage, warmth at the injection site  - New or worsening pain

## 2024-10-01 NOTE — LETTER
10/1/2024      Ave Willoughby  [id number] [address] [facility] [id number]      Dear Colleague,    Thank you for referring your patient, Ave Willoughby, to the [facility] [place]. Please see a copy of my visit note below.    ASSESSMENT & PLAN    Cindy was seen today for pain and follow up.    Diagnoses and all orders for this visit:    Baker's cyst of knee, left  -     Large Joint Injection/Arthocentesis      This issue is acute on chronic and Worsening.    # Left Knee Jones's Cyst: Ave Willoughby  was seen today for left posterior knee pain. Symptoms had been going on for 6+ mon. On examination there are positive findings of tenderness to palpation over the posterior knee, pain with end flexion. Imaging findings showed mild knee arthritis. Likely cause of patient's condition due to Baker's cyst last aspirated 4/18/24. Other possible conditions contributing to symptoms include left knee arthritis. Previous aspiration helped for 3+ mon. Counseled patient on nature of condition and treatment options.  Given this plan as below, follow-up 1 mon as needed.     Image Findings: mild knee arthritis, Baker's cyst noted on ultrasound  Treatment: Activities as tolerated, home exercises given today  Medications/Injections: Limited tylenol/ibuprofen for pain for 1-2 weeks, Topical Voltaren gel, repeat left knee Baker's cyst aspiration/steroid injection   Follow-up: In one month if symptoms do not improve, sooner if worsening  Can consider repeat evaluation  Aaron James MD  [facility] [place]    -----  Chief Complaint   Patient presents with     Left Knee - Pain, Follow Up       SUBJECTIVE  Ave Willoughby is a/an 59 year old female who is seen in follow-up for evaluation of left posterior knee pain.     The patient is seen by themselves.     Onset: 8 month(s) ago. Reports insidious onset without acute precipitating event.  Location of Pain: left  "knee  Worsened by: going up and down stairs, prolonged sitting, getting into and out of the car  Better with: movement, injection and aspiration  Treatments tried: left knee injection and aspiration on 4/18/24  Associated symptoms: no distal numbness or tingling; denies swelling or warmth. tightness    Orthopedic/Surgical history: NO     No family history pertinent to patient's problem today.      REVIEW OF SYSTEMS:  Review of Systems  Constitutional, HEENT, cardiovascular, pulmonary, gi and gu systems are negative, except as otherwise noted.    OBJECTIVE:  Pulse 78   Resp 18   Ht 1.6 m (5' 3\")   Wt 81.6 kg (180 lb)   LMP 06/24/2012   BMI 31.89 kg/m     General: healthy, alert and in no distress  HEENT: no scleral icterus or conjunctival erythema  Skin: no suspicious lesions or rash. No jaundice.  CV: distal perfusion intact    Resp: normal respiratory effort without conversational dyspnea   Psych: normal mood and affect  Gait: normal steady gait with appropriate coordination and balance    Neuro: Normal light sensory exam of left lower extremity     Ortho Exam   LEFT KNEE  Inspection:    Normal alignment; no edema, erythema, or ecchymosis present  Palpation:    Tender about the popliteal region. Remainder of bony and ligamentous landmarks are nontender.    No effusion is present    Patellofemoral crepitus is Absent  Range of Motion:     00 extension to 1350 flexion  Strength:    Quadriceps 5/5, hamstrings 5/5, gastrocsoleus 5/5, and tibialis anterior 5/5    Extensor mechanism intact  Special Tests:    Positive: None    Negative: Patellar grind, MCL/valgus stress (0 & 30 deg), LCL/varus stress (0 & 30 deg), Lachman's    RADIOLOGY:  I independently, visualized and reviewed these images with the patient    Reviewed previous ultrasound      Review of external notes as documented elsewhere in note  Review of the result(s) of each unique test - left lower leg ultrasound       Disclaimer: This note consists of " symbols derived from keyboarding, dictation and/or voice recognition software. As a result, there may be errors in the script that have gone undetected. Please consider this when interpreting information found in this chart.      Large Joint Injection/Arthocentesis    Date/Time: 10/1/2024 4:01 PM    Performed by: Aaron James MD  Authorized by: Aaron James MD    Indications:  Pain  Needle Size:  18 G  Guidance: ultrasound    Approach:  Posterior  Location:  Knee   Location comment:  Left bakers cyst       Medications:  6 mg betamethasone acet & sod phos 6 (3-3) MG/ML; 2 mL ROPivacaine 5 MG/ML  Aspirate amount (mL):  15  Aspirate:  Serous and yellow  Outcome:  Tolerated well, no immediate complications  Procedure discussed: discussed risks, benefits, and alternatives    Consent Given by:  Patient  Timeout: timeout called immediately prior to procedure    Prep: patient was prepped and draped in usual sterile fashion     Ultrasound images of procedure were permanently stored.     Patient reported some improvement of pain after the numbing portion left Baker's cyst aspiration/steroid injection.  Ultrasound guided images were permanently stored.   Aftercare instructions given to patient.  Plan to follow-up as discussed above.     Aaron James MD [facility]            Again, thank you for allowing me to participate in the care of your patient.        Sincerely,        Aaron James MD

## 2024-10-10 RX ORDER — ROPIVACAINE HYDROCHLORIDE 5 MG/ML
2 INJECTION, SOLUTION EPIDURAL; INFILTRATION; PERINEURAL
Status: COMPLETED | OUTPATIENT
Start: 2024-10-01 | End: 2024-10-01

## 2024-10-10 RX ORDER — BETAMETHASONE SODIUM PHOSPHATE AND BETAMETHASONE ACETATE 3; 3 MG/ML; MG/ML
6 INJECTION, SUSPENSION INTRA-ARTICULAR; INTRALESIONAL; INTRAMUSCULAR; SOFT TISSUE
Status: COMPLETED | OUTPATIENT
Start: 2024-10-01 | End: 2024-10-01

## 2024-11-07 ENCOUNTER — TRANSFERRED RECORDS (OUTPATIENT)
Dept: HEALTH INFORMATION MANAGEMENT | Facility: CLINIC | Age: 59
End: 2024-11-07
Payer: COMMERCIAL

## 2024-11-20 ENCOUNTER — OFFICE VISIT (OUTPATIENT)
Dept: RHEUMATOLOGY | Facility: CLINIC | Age: 59
End: 2024-11-20
Payer: COMMERCIAL

## 2024-11-20 VITALS
OXYGEN SATURATION: 99 % | DIASTOLIC BLOOD PRESSURE: 86 MMHG | BODY MASS INDEX: 35.78 KG/M2 | SYSTOLIC BLOOD PRESSURE: 128 MMHG | WEIGHT: 202 LBS | HEART RATE: 66 BPM

## 2024-11-20 DIAGNOSIS — M17.0 PRIMARY OSTEOARTHRITIS OF BOTH KNEES: ICD-10-CM

## 2024-11-20 DIAGNOSIS — M19.041 PRIMARY OSTEOARTHRITIS OF BOTH HANDS: ICD-10-CM

## 2024-11-20 DIAGNOSIS — R68.2 DRY MOUTH: ICD-10-CM

## 2024-11-20 DIAGNOSIS — M35.00 SICCA SYNDROME (H): Primary | ICD-10-CM

## 2024-11-20 DIAGNOSIS — M19.042 PRIMARY OSTEOARTHRITIS OF BOTH HANDS: ICD-10-CM

## 2024-11-20 LAB
ALBUMIN SERPL BCG-MCNC: 4.4 G/DL (ref 3.5–5.2)
ALP SERPL-CCNC: 113 U/L (ref 40–150)
ALT SERPL W P-5'-P-CCNC: 8 U/L (ref 0–50)
ANION GAP SERPL CALCULATED.3IONS-SCNC: 11 MMOL/L (ref 7–15)
AST SERPL W P-5'-P-CCNC: 22 U/L (ref 0–45)
BASOPHILS # BLD AUTO: 0.1 10E3/UL (ref 0–0.2)
BASOPHILS NFR BLD AUTO: 2 %
BILIRUB SERPL-MCNC: 0.5 MG/DL
BUN SERPL-MCNC: 13.8 MG/DL (ref 8–23)
CALCIUM SERPL-MCNC: 10.2 MG/DL (ref 8.8–10.4)
CHLORIDE SERPL-SCNC: 103 MMOL/L (ref 98–107)
CREAT SERPL-MCNC: 1 MG/DL (ref 0.51–0.95)
EGFRCR SERPLBLD CKD-EPI 2021: 64 ML/MIN/1.73M2
EOSINOPHIL # BLD AUTO: 0.4 10E3/UL (ref 0–0.7)
EOSINOPHIL NFR BLD AUTO: 7 %
ERYTHROCYTE [DISTWIDTH] IN BLOOD BY AUTOMATED COUNT: 13.1 % (ref 10–15)
GLUCOSE SERPL-MCNC: 93 MG/DL (ref 70–99)
HCO3 SERPL-SCNC: 26 MMOL/L (ref 22–29)
HCT VFR BLD AUTO: 45.9 % (ref 35–47)
HGB BLD-MCNC: 15.4 G/DL (ref 11.7–15.7)
IMM GRANULOCYTES # BLD: 0 10E3/UL
IMM GRANULOCYTES NFR BLD: 0 %
LYMPHOCYTES # BLD AUTO: 1.4 10E3/UL (ref 0.8–5.3)
LYMPHOCYTES NFR BLD AUTO: 28 %
MCH RBC QN AUTO: 30.7 PG (ref 26.5–33)
MCHC RBC AUTO-ENTMCNC: 33.6 G/DL (ref 31.5–36.5)
MCV RBC AUTO: 91 FL (ref 78–100)
MONOCYTES # BLD AUTO: 0.5 10E3/UL (ref 0–1.3)
MONOCYTES NFR BLD AUTO: 10 %
NEUTROPHILS # BLD AUTO: 2.6 10E3/UL (ref 1.6–8.3)
NEUTROPHILS NFR BLD AUTO: 53 %
PLATELET # BLD AUTO: 339 10E3/UL (ref 150–450)
POTASSIUM SERPL-SCNC: 4.8 MMOL/L (ref 3.4–5.3)
PROT SERPL-MCNC: 7.8 G/DL (ref 6.4–8.3)
RBC # BLD AUTO: 5.02 10E6/UL (ref 3.8–5.2)
SODIUM SERPL-SCNC: 140 MMOL/L (ref 135–145)
WBC # BLD AUTO: 4.8 10E3/UL (ref 4–11)

## 2024-11-20 PROCEDURE — 85025 COMPLETE CBC W/AUTO DIFF WBC: CPT | Performed by: INTERNAL MEDICINE

## 2024-11-20 PROCEDURE — 36415 COLL VENOUS BLD VENIPUNCTURE: CPT | Performed by: INTERNAL MEDICINE

## 2024-11-20 PROCEDURE — 80053 COMPREHEN METABOLIC PANEL: CPT | Performed by: INTERNAL MEDICINE

## 2024-11-20 RX ORDER — PILOCARPINE HYDROCHLORIDE 5 MG/1
5 TABLET, FILM COATED ORAL 3 TIMES DAILY
Qty: 270 TABLET | Refills: 3 | Status: SHIPPED | OUTPATIENT
Start: 2024-11-20

## 2024-11-20 NOTE — NURSING NOTE
RAPID3 (0-30) Cumulative Score  3.0          RAPID3 Weighted Score (divide #4 by 3 and that is the weighted score)  1

## 2024-11-24 DIAGNOSIS — R79.89 ELEVATED SERUM CREATININE: Primary | ICD-10-CM

## 2024-12-07 ENCOUNTER — LAB (OUTPATIENT)
Dept: LAB | Facility: CLINIC | Age: 59
End: 2024-12-07
Payer: COMMERCIAL

## 2024-12-07 DIAGNOSIS — Z86.718 HISTORY OF DVT OF LOWER EXTREMITY: ICD-10-CM

## 2024-12-07 DIAGNOSIS — E03.4 HYPOTHYROIDISM DUE TO ACQUIRED ATROPHY OF THYROID: ICD-10-CM

## 2024-12-07 DIAGNOSIS — R79.89 ELEVATED SERUM CREATININE: Primary | ICD-10-CM

## 2024-12-07 PROCEDURE — 85379 FIBRIN DEGRADATION QUANT: CPT

## 2024-12-07 PROCEDURE — 36415 COLL VENOUS BLD VENIPUNCTURE: CPT

## 2024-12-07 PROCEDURE — 84443 ASSAY THYROID STIM HORMONE: CPT

## 2024-12-09 LAB
D DIMER PPP FEU-MCNC: 0.29 UG/ML FEU (ref 0–0.5)
TSH SERPL DL<=0.005 MIU/L-ACNC: 3.73 UIU/ML (ref 0.3–4.2)

## 2024-12-21 SDOH — HEALTH STABILITY: PHYSICAL HEALTH: ON AVERAGE, HOW MANY MINUTES DO YOU ENGAGE IN EXERCISE AT THIS LEVEL?: 60 MIN

## 2024-12-21 SDOH — HEALTH STABILITY: PHYSICAL HEALTH: ON AVERAGE, HOW MANY DAYS PER WEEK DO YOU ENGAGE IN MODERATE TO STRENUOUS EXERCISE (LIKE A BRISK WALK)?: 5 DAYS

## 2024-12-21 ASSESSMENT — SOCIAL DETERMINANTS OF HEALTH (SDOH): HOW OFTEN DO YOU GET TOGETHER WITH FRIENDS OR RELATIVES?: TWICE A WEEK

## 2024-12-24 ENCOUNTER — OFFICE VISIT (OUTPATIENT)
Dept: FAMILY MEDICINE | Facility: CLINIC | Age: 59
End: 2024-12-24
Attending: STUDENT IN AN ORGANIZED HEALTH CARE EDUCATION/TRAINING PROGRAM
Payer: COMMERCIAL

## 2024-12-24 VITALS
BODY MASS INDEX: 35.86 KG/M2 | HEIGHT: 63 IN | RESPIRATION RATE: 16 BRPM | HEART RATE: 75 BPM | SYSTOLIC BLOOD PRESSURE: 118 MMHG | WEIGHT: 202.4 LBS | TEMPERATURE: 97.5 F | OXYGEN SATURATION: 99 % | DIASTOLIC BLOOD PRESSURE: 82 MMHG

## 2024-12-24 DIAGNOSIS — M25.561 BILATERAL CHRONIC KNEE PAIN: ICD-10-CM

## 2024-12-24 DIAGNOSIS — M25.562 BILATERAL CHRONIC KNEE PAIN: ICD-10-CM

## 2024-12-24 DIAGNOSIS — Z29.11 NEED FOR RSV VACCINATION: ICD-10-CM

## 2024-12-24 DIAGNOSIS — Z00.00 ROUTINE GENERAL MEDICAL EXAMINATION AT A HEALTH CARE FACILITY: Primary | ICD-10-CM

## 2024-12-24 DIAGNOSIS — G89.29 BILATERAL CHRONIC KNEE PAIN: ICD-10-CM

## 2024-12-24 DIAGNOSIS — E03.4 HYPOTHYROIDISM DUE TO ACQUIRED ATROPHY OF THYROID: ICD-10-CM

## 2024-12-24 PROCEDURE — 99396 PREV VISIT EST AGE 40-64: CPT | Mod: 25 | Performed by: STUDENT IN AN ORGANIZED HEALTH CARE EDUCATION/TRAINING PROGRAM

## 2024-12-24 PROCEDURE — 90678 RSV VACC PREF BIVALENT IM: CPT | Performed by: STUDENT IN AN ORGANIZED HEALTH CARE EDUCATION/TRAINING PROGRAM

## 2024-12-24 PROCEDURE — 90471 IMMUNIZATION ADMIN: CPT | Performed by: STUDENT IN AN ORGANIZED HEALTH CARE EDUCATION/TRAINING PROGRAM

## 2024-12-24 PROCEDURE — 99214 OFFICE O/P EST MOD 30 MIN: CPT | Mod: 25 | Performed by: STUDENT IN AN ORGANIZED HEALTH CARE EDUCATION/TRAINING PROGRAM

## 2024-12-24 RX ORDER — LEVOTHYROXINE SODIUM 75 UG/1
75 TABLET ORAL DAILY
Qty: 90 TABLET | Refills: 3 | Status: SHIPPED | OUTPATIENT
Start: 2024-12-24

## 2024-12-24 NOTE — PATIENT INSTRUCTIONS
Patient Education   Preventive Care Advice   This is general advice given by our system to help you stay healthy. However, your care team may have specific advice just for you. Please talk to your care team about your preventive care needs.  Nutrition  Eat 5 or more servings of fruits and vegetables each day.  Try wheat bread, brown rice and whole grain pasta (instead of white bread, rice, and pasta).  Get enough calcium and vitamin D. Check the label on foods and aim for 100% of the RDA (recommended daily allowance).  Lifestyle  Exercise at least 150 minutes each week  (30 minutes a day, 5 days a week).  Do muscle strengthening activities 2 days a week. These help control your weight and prevent disease.  No smoking.  Wear sunscreen to prevent skin cancer.  Have a dental exam and cleaning every 6 months.  Yearly exams  See your health care team every year to talk about:  Any changes in your health.  Any medicines your care team has prescribed.  Preventive care, family planning, and ways to prevent chronic diseases.  Shots (vaccines)   HPV shots (up to age 26), if you've never had them before.  Hepatitis B shots (up to age 59), if you've never had them before.  COVID-19 shot: Get this shot when it's due.  Flu shot: Get a flu shot every year.  Tetanus shot: Get a tetanus shot every 10 years.  Pneumococcal, hepatitis A, and RSV shots: Ask your care team if you need these based on your risk.  Shingles shot (for age 50 and up)  General health tests  Diabetes screening:  Starting at age 35, Get screened for diabetes at least every 3 years.  If you are younger than age 35, ask your care team if you should be screened for diabetes.  Cholesterol test: At age 39, start having a cholesterol test every 5 years, or more often if advised.  Bone density scan (DEXA): At age 50, ask your care team if you should have this scan for osteoporosis (brittle bones).  Hepatitis C: Get tested at least once in your life.  STIs (sexually  transmitted infections)  Before age 24: Ask your care team if you should be screened for STIs.  After age 24: Get screened for STIs if you're at risk. You are at risk for STIs (including HIV) if:  You are sexually active with more than one person.  You don't use condoms every time.  You or a partner was diagnosed with a sexually transmitted infection.  If you are at risk for HIV, ask about PrEP medicine to prevent HIV.  Get tested for HIV at least once in your life, whether you are at risk for HIV or not.  Cancer screening tests  Cervical cancer screening: If you have a cervix, begin getting regular cervical cancer screening tests starting at age 21.  Breast cancer scan (mammogram): If you've ever had breasts, begin having regular mammograms starting at age 40. This is a scan to check for breast cancer.  Colon cancer screening: It is important to start screening for colon cancer at age 45.  Have a colonoscopy test every 10 years (or more often if you're at risk) Or, ask your provider about stool tests like a FIT test every year or Cologuard test every 3 years.  To learn more about your testing options, visit:   .  For help making a decision, visit:   https://bit.ly/vn78615.  Prostate cancer screening test: If you have a prostate, ask your care team if a prostate cancer screening test (PSA) at age 55 is right for you.  Lung cancer screening: If you are a current or former smoker ages 50 to 80, ask your care team if ongoing lung cancer screenings are right for you.  For informational purposes only. Not to replace the advice of your health care provider. Copyright   2023 [employer]. All rights reserved. Clinically reviewed by the [facility] Transitions Program. [employer] 234916 - REV 01/24.

## 2024-12-24 NOTE — PROGRESS NOTES
zPreventive Care Visit  [facility]  Dec 24, 2024      Assessment & Plan     (Z00.00) Routine general medical examination at a health care facility  (primary encounter diagnosis)  Comment: Stable  Plan: REVIEW OF HEALTH MAINTENANCE PROTOCOL ORDERS            (E03.4) Hypothyroidism due to acquired atrophy of thyroid  Comment: Chronic, uncontrolled.  TSH within normal range.  Will send 1 year refill.  Plan: REVIEW OF HEALTH MAINTENANCE PROTOCOL ORDERS,         levothyroxine (SYNTHROID/LEVOTHROID) 75 MCG         tablet            (M25.561,  M25.562,  G89.29) Bilateral chronic knee pain  Comment: Chronic, uncontrolled.  At this time we will recommend a secondary opinion with [facility].  Referral placed.  Consideration for patient to complete an MRI of bilateral knees to determine if symptoms are worsening with Baker's cyst versus other structural damages.  Plan: REVIEW OF HEALTH MAINTENANCE PROTOCOL ORDERS,         Orthopedic  Referral            (Z29.11) Need for RSV vaccination  Comment: Stable  Plan: RSV VACCINE (ABRYSVO), REVIEW OF HEALTH         MAINTENANCE PROTOCOL ORDERS            Dictation Disclaimer: Some of this Note has been completed with voice-recognition dictation software. Although errors are generally corrected real-time, there is the potential for a rare error to be present in the completed chart.                   Counseling  Appropriate preventive services were addressed with this patient via screening, questionnaire, or discussion as appropriate for fall prevention, nutrition, physical activity, Tobacco-use cessation, social engagement, weight loss and cognition.  Checklist reviewing preventive services available has been given to the patient.  Reviewed patient's diet, addressing concerns and/or questions.           Lisa White is a 59 year old, presenting for the following:  Physical        12/24/2024     8:07 AM    Additional Questions   Roomed by Mary PALACIOS  Patient is a 59-year-old female presenting for an annual physical.  She reports that her right knee is worsening and pain in addition to her left knee pain.  She reports that her knee buckled and was extremely stiff over the weekend in which she had to elevate and rest her leg.  She reports that her Baker's cyst appear to be getting worse and at this time she is concerned that she has difficulty with walking and performing normal tasks around the house.  She is hoping be seen by the sports medicine doctor she saw previously however is open to a referral to [facility] for the consideration of a knee replacement.  Patient states that symptoms are worsening and she is wanting to make sure that things do not progressively worsen.        Health Care Directive  Patient does not have a Health Care Directive: Discussed advance care planning with patient; information given to patient to review.      12/21/2024   General Health   How would you rate your overall physical health? Excellent   Feel stress (tense, anxious, or unable to sleep) Only a little   (!) STRESS CONCERN      12/21/2024   Nutrition   Three or more servings of calcium each day? (!) NO   Diet: Regular (no restrictions)   How many servings of fruit and vegetables per day? 4 or more   How many sweetened beverages each day? 0-1         12/21/2024   Exercise   Days per week of moderate/strenous exercise 5 days   Average minutes spent exercising at this level 60 min         12/21/2024   Social Factors   Frequency of gathering with friends or relatives Twice a week   Worry food won't last until get money to buy more No    No   Food not last or not have enough money for food? No    No   Do you have housing? (Housing is defined as stable permanent housing and does not include staying ouside in a car, in a tent, in an abandoned building, in an overnight shelter, or couch-surfing.) Yes    Yes   Are you worried about  losing your housing? No    No   Lack of transportation? No    No   Unable to get utilities (heat,electricity)? No    No       Multiple values from one day are sorted in reverse-chronological order         12/21/2024   Fall Risk   Fallen 2 or more times in the past year? No   Trouble with walking or balance? No          12/21/2024   Dental   Dentist two times every year? Yes         12/21/2024   TB Screening   Were you born outside of the [place]? No           Today's PHQ-2 Score:       1/23/2024    11:09 AM   PHQ-2 ( 1999 [employer])   Q1: Little interest or pleasure in doing things 0   Q2: Feeling down, depressed or hopeless 0   PHQ-2 Score 0   Q1: Little interest or pleasure in doing things Not at all   Q2: Feeling down, depressed or hopeless Not at all   PHQ-2 Score 0         12/21/2024   Substance Use   Alcohol more than 3/day or more than 7/wk No   Do you use any other substances recreationally? No     Social History     Tobacco Use    Smoking status: Never     Passive exposure: Never    Smokeless tobacco: Never   Vaping Use    Vaping status: Never Used   Substance Use Topics    Alcohol use: Yes     Comment: 5 drinks a week    Drug use: No           12/21/2023   LAST FHS-7 RESULTS   1st degree relative breast or ovarian cancer Yes   Any relative bilateral breast cancer No   Any male have breast cancer No   Any ONE woman have BOTH breast AND ovarian cancer No   Any woman with breast cancer before 50yrs No   2 or more relatives with breast AND/OR ovarian cancer No   2 or more relatives with breast AND/OR bowel cancer Yes        Mammogram Screening - Mammogram every 1-2 years updated in Health Maintenance based on mutual decision making        12/21/2024   STI Screening   New sexual partner(s) since last STI/HIV test? No     History of abnormal Pap smear: No - age 30- 64 PAP with HPV every 5 years recommended        Latest Ref Rng & Units 11/22/2021     7:25 AM 9/2/2016    10:00 AM 9/2/2016    12:00 AM   PAP / HPV   PAP   "Negative for Intraepithelial Lesion or Malignancy (NILM)      PAP (Historical)    NIL    HPV 16 DNA Negative Negative  Negative     HPV 18 DNA Negative Negative  Negative     Other HR HPV Negative Negative  Negative       ASCVD Risk   The 10-year ASCVD risk score (Guy SEAY, et al., 2019) is: 2.4%    Values used to calculate the score:      Age: 59 years      Sex: Female      Is Non- : No      Diabetic: No      Tobacco smoker: No      Systolic Blood Pressure: 118 mmHg      Is BP treated: No      HDL Cholesterol: 62 mg/dL      Total Cholesterol: 180 mg/dL           Reviewed and updated as needed this visit by Provider                         ROS: 10 point ROS neg other than the symptoms noted above in the HPI.       Objective    Exam  /82   Pulse 75   Temp 97.5  F (36.4  C) (Temporal)   Resp 16   Ht 1.59 m (5' 2.6\")   Wt 91.8 kg (202 lb 6.4 oz)   LMP 06/24/2012   SpO2 99%   BMI 36.32 kg/m     Estimated body mass index is 36.32 kg/m  as calculated from the following:    Height as of this encounter: 1.59 m (5' 2.6\").    Weight as of this encounter: 91.8 kg (202 lb 6.4 oz).    Physical Exam  GENERAL: healthy, alert and no distress  EYES: Eyes grossly normal to inspection, PERRL and conjunctivae and sclerae normal  Neck: No visible JVD or lymphadenopathy   RESP: symmetrical rise in chest   CV: No peripheral edema notable   SKIN: no suspicious lesions or rashes  PSYCH: mentation appears normal, affect normal/bright          Signed Electronically by: ANGELICA RUIZ MD    "

## 2024-12-26 ENCOUNTER — OFFICE VISIT (OUTPATIENT)
Dept: ORTHOPEDICS | Facility: CLINIC | Age: 59
End: 2024-12-26
Payer: COMMERCIAL

## 2024-12-26 DIAGNOSIS — G89.29 CHRONIC PAIN OF LEFT KNEE: Primary | ICD-10-CM

## 2024-12-26 DIAGNOSIS — M25.562 CHRONIC PAIN OF LEFT KNEE: Primary | ICD-10-CM

## 2024-12-26 DIAGNOSIS — M71.22 BAKER'S CYST OF KNEE, LEFT: ICD-10-CM

## 2024-12-26 LAB
APPEARANCE FLD: ABNORMAL
CELL COUNT BODY FLUID SOURCE: ABNORMAL
COLOR FLD: ABNORMAL
CRYSTALS SNV MICRO: NORMAL
EOSINOPHIL NFR FLD MANUAL: 1 %
GRAM STAIN RESULT: NORMAL
GRAM STAIN RESULT: NORMAL
LYMPHOCYTES NFR FLD MANUAL: 2 %
MONOS+MACROS NFR FLD MANUAL: 2 %
NEUTS BAND NFR FLD MANUAL: 95 %
WBC # FLD AUTO: ABNORMAL /UL

## 2024-12-26 PROCEDURE — 89060 EXAM SYNOVIAL FLUID CRYSTALS: CPT | Performed by: FAMILY MEDICINE

## 2024-12-26 PROCEDURE — 89051 BODY FLUID CELL COUNT: CPT | Performed by: FAMILY MEDICINE

## 2024-12-26 PROCEDURE — 87070 CULTURE OTHR SPECIMN AEROBIC: CPT | Performed by: FAMILY MEDICINE

## 2024-12-26 PROCEDURE — 87205 SMEAR GRAM STAIN: CPT | Performed by: FAMILY MEDICINE

## 2024-12-26 PROCEDURE — 99214 OFFICE O/P EST MOD 30 MIN: CPT | Mod: 25 | Performed by: FAMILY MEDICINE

## 2024-12-26 PROCEDURE — 20610 DRAIN/INJ JOINT/BURSA W/O US: CPT | Mod: LT | Performed by: FAMILY MEDICINE

## 2024-12-26 NOTE — PROGRESS NOTES
ASSESSMENT & PLAN    There are no diagnoses linked to this encounter.  This issue is {ACUTE/CHRONIC:278980} and {IMPROVING WORSENIN}.      {FOLLOW UP PLANS (Optional):983386}    ***I was present with the resident during the history and exam.  I discussed the case with the resident and agree with the findings as documented in the assessment and plan.       Aaron James MD  [facility] SP[facility] [place]    -----  Chief Complaint   Patient presents with    Right Knee - Consult       SUBJECTIVE  Ave Willoughby is a/an 59 year old female who is seen as a self referral for evaluation of right posterior knee pain. She reports that she has a history of a right baker's cyst      The patient is seen by themselves.    Onset: 2 week(s) ago. Reports insidious onset without acute precipitating event.  Location of Pain: right knee  Worsened by: activity  Better with: rest and elevation  Associated symptoms: swelling, pain and reports she has had a low grade fever for the last 2-3 days and body aches.       REVIEW OF SYSTEMS:  Review of Systems  {ROS COMP (Optional):291730}    OBJECTIVE:  LMP 2012    General: healthy, alert and in no distress  HEENT: no scleral icterus or conjunctival erythema  Skin: no suspicious lesions or rash. No jaundice.  CV: distal perfusion intact    Resp: normal respiratory effort without conversational dyspnea   Psych: normal mood and affect  Gait: normal steady gait with appropriate coordination and balance    Neuro: Normal light sensory exam of *** extremity ***    Ortho Exam   {.jyex:[id number]}    RADIOLOGY:  I independently *** ordered, visualized and reviewed these images with the patient  ***      {[facility]  Documentation (Optional):476131}  { E&M time (Optional):423208}  {Provider  Link to [facility] Help Grid :807005}       Disclaimer: This note consists of symbols derived from keyboarding, dictation and/or voice recognition software. As a result, there may  be errors in the script that have gone undetected. Please consider this when interpreting information found in this chart.     Large Joint Injection: Right Knee Jones's Cyst Aspiration    Date/Time: 12/26/2024 4:24 PM    Performed by: Aaron James MD  Authorized by: Aaron James MD    Indications:  Pain  Needle Size:  18 G  Approach:  Posterior  Location:  Knee   Location comment:  Right Baker's Cyst      Aspirate:  Blood-tinged  Aspirate analysis: sent for lab analysis    Outcome:  Tolerated well, no immediate complications  Procedure discussed: discussed risks, benefits, and alternatives    Consent Given by:  Patient  Timeout: timeout called immediately prior to procedure    Prep: patient was prepped and draped in usual sterile fashion         consists of symbols derived from keyboarding, dictation and/or voice recognition software. As a result, there may be errors in the script that have gone undetected. Please consider this when interpreting information found in this chart.     Large Joint Injection: Right Knee Jones's Cyst Aspiration    Date/Time: 12/26/2024 4:24 PM    Performed by: Aaron James MD  Authorized by: Aaron James MD    Indications:  Pain  Needle Size:  18 G  Approach:  Posterior  Location:  Knee   Location comment:  Right Baker's Cyst      Aspirate amount (mL):  17  Aspirate:  Blood-tinged  Aspirate analysis: sent for lab analysis    Outcome:  Tolerated well, no immediate complications  Procedure discussed: discussed risks, benefits, and alternatives    Consent Given by:  Patient  Timeout: timeout called immediately prior to procedure    Prep: patient was prepped and draped in usual sterile fashion     Patient tolerated right knee Baker's cyst aspiration. Fluid sent for analysis.  Ultrasound guided images were permanently stored.   Aftercare instructions given to patient.  Plan to follow-up as discussed above.     Aaron James MD [facility]

## 2024-12-26 NOTE — PATIENT INSTRUCTIONS
# Acute on Chronic Right Knee Pain: Ave Willoughby  was seen today for right knee pain. Symptoms had been going on for 6+ mon worsening over the past few weeks. On examination there are positive findings of swelling over right knee, tenderness to palpation over the medial femoral condyle, knee effusion. Imaging findings showed Baker's cyst on previous ultrasound, knee arthritis. There is a concern for possible insufficiency fracture. Other possible conditions contributing to symptoms include possible gout flare, insufficiency fracture.  Counseled patient on nature of condition and treatment options.  Given this plan as below, follow-up 2 weeks as needed.     Image Findings: right knee Baker's cyst, arthritis  Treatment: Activities as tolerated, fluid sent for testing, right knee MRI ordered.  Medications/Injections: Limited tylenol/ibuprofen for pain for 1-2 weeks, Topical Voltaren gel, right knee Baker's cyst aspiration  Follow-up: In person after right knee MRI    Please call [phone]   Ask for my team if you have any questions or concerns    If you have not yet received the influenza vaccine but would like to get one, please call  [phone] or you can schedule via [employer]    It was great seeing you again today!    Aaron James MD, [facility] In[facility] Discharge Instructions    Procedure: right knee Baker's cyst aspiration    You may shower, however avoid swimming, tub baths or hot tubs for 24 hours following your procedure  You may have a mild to moderate increase in pain for several days following the injection.  It may take up to 14 days for the steroid medication to start working although you may feel the effect as early as a few days after the procedure.  You may use ice packs for 10-15 minutes, 3 to 4 times a day at the injection site for comfort  You may use anti-inflammatory medications (such as Ibuprofen or Aleve or Advil) or Tylenol for pain control if necessary  If you were fasting,  you may resume your normal diet and medications after the procedure  If you have diabetes, check your blood sugar more frequently than usual as your blood sugar may be higher than normal for 10-14 days following a steroid injection. Contact your doctor who manages your diabetes if your blood sugar is higher than usual    If you experience any of the following, call [facility] @ [phone] or [phone]  -Fever over 100 degree F  -Swelling, bleeding, redness, drainage, warmth at the injection site  - New or worsening pain

## 2024-12-26 NOTE — NURSING NOTE
[address] [address]  Dept: [phone]  ______________________________________________________________________________    Patient: Ave Willoughby   : 1965   MRN: [id number]   2024    INVASIVE PROCEDURE SAFETY CHECKLIST    Date: 2024   Procedure: right knee baker's cyst aspiration   Patient Name: Ave Willoughby  MRN: [id number]  YOB: 1965    Action: Complete sections as appropriate. Any discrepancy results in a HARD COPY until resolved.     PRE PROCEDURE:  Patient ID verified with 2 identifiers (name and  or MRN): Yes  Procedure and site verified with patient/designee (when able): Yes  Accurate consent documentation in medical record: Yes  H&P (or appropriate assessment) documented in medical record: Yes  H&P must be up to 20 days prior to procedure and updates within 24 hours of procedure as applicable: Yes  Relevant diagnostic and radiology test results appropriately labeled and displayed as applicable: NA  Procedure site(s) marked with provider initials: NA    TIMEOUT:  Time-Out performed immediately prior to starting procedure, including verbal and active participation of all team members addressing the following:Yes  * Correct patient identify  * Confirmed that the correct side and site are marked  * An accurate procedure consent form  * Agreement on the procedure to be done  * Correct patient position  * Relevant images and results are properly labeled and appropriately displayed  * The need to administer antibiotics or fluids for irrigation purposes during the procedure as applicable   * Safety precautions based on patient history or medication use    DURING PROCEDURE: Verification of correct person, site, and procedures any time the responsibility for care of the patient is transferred to another member of the care team.       Prior to injection, verified patient identity using patient's  name and date of birth.  Due to injection administration, patient instructed to remain in clinic for 15 minutes  afterwards, and to report any adverse reaction to me immediately.    Baker's Cyst    Graciela Roberts, ATC  December 26, 2024

## 2024-12-26 NOTE — Clinical Note
2024      Ave Willoughby  [address] [address] [address]      Dear Colleague,    Thank you for referring your patient, Ave Willoughby, to the [facility] SP[facility]TS [facility] [place]. Please see a copy of my visit note below.    ASSESSMENT & PLAN    There are no diagnoses linked to this encounter.  This issue is {ACUTE/CHRONIC:568368} and {IMPROVING WORSENIN}.      {FOLLOW UP PLANS (Optional):418059}    ***I was present with the resident during the history and exam.  I discussed the case with the resident and agree with the findings as documented in the assessment and plan.       Aaron James MD  [facility] [place]    -----  Chief Complaint   Patient presents with    Right Knee - Consult       SUBJECTIVE  Ave Willoughby is a/an 59 year old female who is seen as a self referral for evaluation of right posterior knee pain. She reports that she has a history of a right baker's cyst      The patient is seen by themselves.    Onset: 2 week(s) ago. Reports insidious onset without acute precipitating event.  Location of Pain: right knee  Worsened by: activity  Better with: rest and elevation  Associated symptoms: swelling, pain and reports she has had a low grade fever for the last 2-3 days and body aches.       REVIEW OF SYSTEMS:  Review of Systems  {ROS COMP (Optional):690156}    OBJECTIVE:  LMP 2012    General: healthy, alert and in no distress  HEENT: no scleral icterus or conjunctival erythema  Skin: no suspicious lesions or rash. No jaundice.  CV: distal perfusion intact    Resp: normal respiratory effort without conversational dyspnea   Psych: normal mood and affect  Gait: normal steady gait with appropriate coordination and balance    Neuro: Normal light sensory exam of *** extremity ***    Ortho Exam   {.jyex:539408}    RADIOLOGY:  I independently *** ordered, visualized and reviewed these images with the patient  ***      {MDM  2021 Documentation (Optional):056667}  {2021 E&M time (Optional):393576}  {Provider  Link to [facility] Help Grid :337063}       Disclaimer: This note consists of symbols derived from keyboarding, dictation and/or voice recognition software. As a result, there may be errors in the script that have gone undetected. Please consider this when interpreting information found in this chart.       Again, thank you for allowing me to participate in the care of your patient.        Sincerely,        Aaron James MD    Electronically signed

## 2024-12-26 NOTE — LETTER
these images with the patient    Reviewed right lower extremity ultrasound, right knee x-rays      Review of external notes as documented elsewhere in note  Review of the result(s) of each unique test - right knee x-rays/ultrasound       Disclaimer: This note consists of symbols derived from keyboarding, dictation and/or voice recognition software. As a result, there may be errors in the script that have gone undetected. Please consider this when interpreting information found in this chart.     Large Joint Injection: Right Knee Jones's Cyst Aspiration    Date/Time: 12/26/2024 4:24 PM    Performed by: Aaron James MD  Authorized by: Aaron James MD    Indications:  Pain  Needle Size:  18 G  Approach:  Posterior  Location:  Knee   Location comment:  Right Baker's Cyst      Aspirate amount (mL):  17  Aspirate:  Blood-tinged  Aspirate analysis: sent for lab analysis    Outcome:  Tolerated well, no immediate complications  Procedure discussed: discussed risks, benefits, and alternatives    Consent Given by:  Patient  Timeout: timeout called immediately prior to procedure    Prep: patient was prepped and draped in usual sterile fashion     Patient tolerated right knee Baker's cyst aspiration. Fluid sent for analysis.  Ultrasound guided images were permanently stored.   Aftercare instructions given to patient.  Plan to follow-up as discussed above.     Aaron James MD [facility]            Again, thank you for allowing me to participate in the care of your patient.        Sincerely,        Aaron James MD    Electronically signed

## 2024-12-31 LAB
BACTERIA SNV CULT: NO GROWTH
GRAM STAIN RESULT: NORMAL
GRAM STAIN RESULT: NORMAL

## 2025-01-09 ENCOUNTER — OFFICE VISIT (OUTPATIENT)
Dept: RHEUMATOLOGY | Facility: CLINIC | Age: 60
End: 2025-01-09
Payer: COMMERCIAL

## 2025-01-09 VITALS
SYSTOLIC BLOOD PRESSURE: 122 MMHG | RESPIRATION RATE: 16 BRPM | OXYGEN SATURATION: 99 % | DIASTOLIC BLOOD PRESSURE: 78 MMHG | HEART RATE: 66 BPM

## 2025-01-09 DIAGNOSIS — M19.90 INFLAMMATORY ARTHRITIS: Primary | ICD-10-CM

## 2025-01-09 DIAGNOSIS — R79.89 ELEVATED SERUM CREATININE: ICD-10-CM

## 2025-01-09 LAB
CREAT SERPL-MCNC: 0.91 MG/DL (ref 0.51–0.95)
EGFRCR SERPLBLD CKD-EPI 2021: 72 ML/MIN/1.73M2

## 2025-01-09 RX ORDER — HYDROXYCHLOROQUINE SULFATE 200 MG/1
200 TABLET, FILM COATED ORAL 2 TIMES DAILY
Qty: 60 TABLET | Refills: 3 | Status: SHIPPED | OUTPATIENT
Start: 2025-01-09

## 2025-01-09 NOTE — PATIENT INSTRUCTIONS
RHEUMATOLOGY    [facility] [place]  [address] [place] [address]  [place] [address]    Phone number: [phone]  Fax number: [phone]    If you need a medication refill, please contact us as you may need lab work and/or a follow up visit prior to your refill.      Thank you for choosing [facility]!    Connie Elam CMA Rheumatology

## 2025-01-09 NOTE — PROGRESS NOTES
Rheumatology Clinic Visit      Ave Willoughby MRN# [id number]   YOB: 1965 Age: 59 year old      Date of visit: 1/09/25   PCP: Dr. Tayla Espinal    Chief Complaint   Patient presents with:  Flare: Knee pain, fluid removed out of right    Assessment and Plan     1.  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  Dry eyes are treated with artificial tears; uses contacts.  Advised that she try using preservative-free artificial tears, and to follow-up with her ophthalmologist to see about possibly getting contacts for dry eyes.  No dry mouth, she is already using Biotene products, sugar-free lozenges, sugar-free gum, avoidance of sugary/caffeinated/carbonated beverages, following with the dentist twice yearly and periodontist twice yearly for periodontal disease.  Significant dry mouth when first evaluated on 8/22/2023.  We reviewed the suspicion for Sjogren's syndrome, but the SSA and SSB were negative.  We previously discussed the option of minor salivary gland biopsy but this would not change the symptomatic treatment at this point, and therefore deferred by patient.  Pilocarpine has been used since 8/2023 with good results, but sometimes more urination or sweating so she is going to try a lower dose of 2.5 mg instead of the 5 mg dose.  Chronic illness, stable.    - Continue pilocarpine 2.5-5 mg 2-3 times daily     2.  Inflammatory polyarthropathy: At a previous visit she had reported PIP pain and swelling but there was no synovitis on exam at that time or today.  More recently with recurring knee effusion; synovial fluid showed a [place] blood [place] count of 363[address], 95% neutrophil, and no crystals.  The synovial fluid analysis suggest an underlying inflammatory etiology.  Less likely pseudo-septic reaction related to Synvisc considering synovial fluids white blood cell count.  Reviewed diagnosis of inflammatory polyarthropathy.  Discussed methotrexate and hydroxychloroquine.  Start  hydroxychloroquine  - Start hydroxychloroquine 200 mg twice daily  - Ophthalmology referral for hydroxychloroquine toxicity monitoring; she already goes to an ophthalmologist so she will call her ophthalmologist to arrange the hydroxychloroquine toxicity monitoring based on exam  - Labs in 3 months: CBC, Creatinine, Hepatic Panel, ESR, CRP, Hepatitis B core antibody and surface antigen, hepatitis C antibody    # Hydroxychloroquine (Plaquenil) Risks and Benefits:  The risks and benefits of hydroxychloroquine were discussed in detail and the patient verbalized understanding; the patient also verbalized agreement to get the required ophthalmologic toxicity monitoring.  The risks discussed include, but are not limited to, the risk for hypersensitivity, anaphylaxis, anaphylactoid reactions, irreversible retinal damage, rare hematologic reactions, and rare cardiomyopathy.  Patients with G6PD deficiency or hepatic impairment may be at an increased risk for adverse effects.  I encouraged reviewing the package insert and asking any questions about the medication.      3.  Primary osteoarthritis of both knees: Following with orthopedics/sports medicine for Synvisc and steroid injections that have been helpful, but more recently with recurrent effusions without clear etiology, ultimately found to have an inflammatory synovial fluid cell count; see documentation regarding inflammatory polyarthropathy    4. Primary osteoarthritis of both hands: Evidenced by squaring of bilateral first CMC joints Heberden's nodes, Luis's nodes.  Topical Voltaren gel effective.  Hand therapy referral given today.  - diclofenac (VOLTAREN) 1 % topical gel; Apply up to 2 grams of 1% gel to upper extremity and up to 4 grams of 1% gel to lower extremity up to 4 times daily as needed for joint pain  Dispense: 400 g; Refill: 2    5. primary osteoarthritis of both feet: History of bunion surgery.  Currently with bony hypertrophy at the first MTPs.   Stiff soled shoes whenever ambulatory, indoors and outdoors, to prevent repetitive articulation at the MTPs.    6.  Creatinine elevation: Mild.  Recheck labs.  - Lab today: Creatinine    Total minutes spent in evaluation with patient, documentation, , and review of pertinent studies and chart notes: 20    Ms. Willoughby verbalized agreement with and understanding of the rational for the diagnosis and treatment plan.  All questions were answered to best of my ability and the patient's satisfaction. Ms. Willoughby was advised to contact the clinic with any questions that may arise after the clinic visit.      Thank you for involving me in the care of the patient    Return to clinic: 3 months    HPI   Ave Willoughby is a 59 year old female with a past medical history significant for lumbar foraminal stenosis, hypothyroidism, hx of DVT (s/p period of time on anticoagulation) and positive JUD who presents for  rheumatology follow-up.    8/22/2023: 3-5 years of worsening joint symptoms; feeling of swelling in the hands, knees, and feet; worse in the AM, better with activity.  Morning stiffness for about 2 hours.  Dry eyes/mouth/vaginal.  Wears contacts; uses artificial tears (one for when using contacts, and one for when contacts are not in).  Dry mouth tx'd with Biotene mouthwash at bedtime, occasional a biotene spray; sipping water several times per hour; sugar free gum sometimes; sugar free lozenges.  Seeing a dentist and periodontist each twice yearly. Canker sores 3-4 x/yr, occurring for as long as she can remember.; not always in the same location, sometimes painful; hx of cavities but none for the past few years.    Staying active; doing a triathlon this weekend.     Had palpitations previously but none now.  Hypothyroidism on stable treatment for a while now.  Used to use meloxicam but no longer.    Has been seen at [facility] where Synvisc injection for each knee has been helpful    11/30/2023:  Voltaren gel is helpful.  She would like to go to hand therapy for hand osteoarthritis.  Pilocarpine 5 mg twice daily is very effective for dry mouth; no longer having significant dry mouth.  Pilocarpine 3 times daily was too much -hot sweating.    2/5/2024: Started to have general ill feeling, more fatigue, then few weeks later diagnosed with DVT.  Currently on anticoagulation at direction of another clinic.  At 1 point had more joint pain in the PIPs where there was also swelling; pain at the PIPs was not necessarily better or worse with activity.  Swelling of the PIPs has since resolved.  Feel that she is doing better now.      11/20/2024: pilocarpine 5mg BID is helpful for dry mouth but sometimes associated with increased urinary frequency after taking it, and sweating.  Some pain at the first CMC joints that is worse with activity and improves with rest; topical Voltaren gel is helpful.  Knee pain followed by orthopedics; worse with activity and better with rest.    Today, 1/9/2025: Since last seen had synovial fluid removed from her right knee that was found to be inflammatory, prompting an earlier visit today.  She has been following with orthopedic/sports medicine for Synvisc injections and steroid injections.  She also notes that she has had 4 times where fluid has been removed, and twice the fluid started about 1 month after Synvisc injection.  Pain in the knee is worse in the morning and improved with activity but too much activity worsens her pain.  Significant improvement since having the synovial fluid removed from her knee.    Denies fevers, chills, nausea, vomiting, constipation, diarrhea. No abdominal pain.  No black or bloody stools.  No chest pain/pressure, palpitations, or shortness of breath at this time. No LE swelling.  No rash.    Tobacco: none  EtOH: 4 drinks/weeks  Drugs: none  Occupation:     ROS   12 point review of system was completed and negative except as  noted in the HPI     Active Problem List     Patient Active Problem List   Diagnosis    Lumbar foraminal stenosis    Family history of colon cancer    Hypothyroidism due to acquired atrophy of thyroid    History of colonic polyps    Anhidrosis    Posterior knee pain, left     Past Medical History     Past Medical History:   Diagnosis Date    Arthritis     Family history of colon cancer     due 2013 for     History of colonic polyps 06/06/2023    Hypothyroid     Hypothyroidism due to acquired atrophy of thyroid 10/15/2015    Lumbar foraminal stenosis 07/17/2012    Sciatic nerve disease      Past Surgical History     Past Surgical History:   Procedure Laterality Date    APPENDECTOMY  4/11/2011    Dr Lona Duncan    COLONOSCOPY  9-29-08    COLONOSCOPY N/A 8/9/2018    Procedure: COMBINED COLONOSCOPY, SINGLE OR MULTIPLE BIOPSY/POLYPECTOMY BY BIOPSY;;  Surgeon: Lc Cervantes MD;  Location: [facility]    COLONOSCOPY WITH CO2 INSUFFLATION N/A 8/9/2018    Procedure: COLONOSCOPY WITH CO2 INSUFFLATION;  COLON SCREEN/ PATTIE;  Surgeon: Lc Cervantes MD;  Location: [facility]    COLONOSCOPY WITH CO2 INSUFFLATION N/A 10/16/2023    Procedure: Colonoscopy with CO2 insufflation;  Surgeon: Calvin Bar MD;  Location: [facility]    ENDOSCOPY UPPER, COLONOSCOPY, COMBINED  7/1/2013    Procedure: COMBINED ENDOSCOPY UPPER, COLONOSCOPY;  COLONOSCOPY SCREEN-FAMILY HX OF COLON CANCER/ EGD-UPPER ENDOSCOPY-UPPER GI SYMPTOMS/ PATTIE;  Surgeon: Lc Cervantes MD;  Location: [facility]    ESOPHAGOSCOPY, GASTROSCOPY, DUODENOSCOPY (EGD), COMBINED  7/1/2013    Procedure: COMBINED ESOPHAGOSCOPY, GASTROSCOPY, DUODENOSCOPY (EGD), BIOPSY SINGLE OR MULTIPLE;;  Surgeon: Lc Cervantes MD;  Location: [facility]     Allergy     Allergies   Allergen Reactions    Pcn [Penicillins]      Current Medication List     Current Outpatient Medications   Medication Sig Dispense Refill    diclofenac (VOLTAREN) 1 % topical gel Apply  up to 2 grams of 1% gel to upper extremity and up to 4 grams of 1% gel to lower extremity up to 4 times daily as needed for joint pain 400 g 2    famotidine (PEPCID) 20 MG tablet Take 20 mg by mouth daily      ketoconazole (NIZORAL) 2 % external shampoo WASH TO AFFECTED AREA 2-3X WEEKLY IN THE SHOWER. LATHER AND LET SIT 1 TO 2 MINUTES PRIOR TO RINSING.      levothyroxine (SYNTHROID/LEVOTHROID) 75 MCG tablet Take 1 tablet (75 mcg) by mouth daily. 90 tablet 3    Multiple Vitamin (MULTIVITAMIN ADULT PO)       pilocarpine (SALAGEN) 5 MG tablet Take 1 tablet (5 mg) by mouth 3 times daily. 270 tablet 3    Vitamin D (Cholecalciferol) 25 MCG (1000 UT) CAPS        Current Facility-Administered Medications   Medication Dose Route Frequency Provider Last Rate Last Admin    1 mL ropivacaine (NAROPIN) injection 5 mg/mL  1 mL      1 mL at 04/18/24 1424    4 mL ropivacaine (NAROPIN) injection 5 mg/mL  4 mL   Aaron James MD   4 mL at 02/08/24 0828    betamethasone acet & sod phos (CELESTONE) injection 6 mg  6 mg      6 mg at 04/18/24 1424    betamethasone acet & sod phos (CELESTONE) injection 6 mg  6 mg   Aaron James MD   6 mg at 02/08/24 0828     Social History   See HPI    Family History     Family History   Problem Relation Age of Onset    Breast Cancer Mother     Thyroid Disease Mother     Cancer - colorectal Father     Cancer Father         brain    Colon Cancer Father     Diabetes Maternal Grandmother     Cardiovascular Paternal Grandmother     Thyroid Disease Sister      Sister: sjogrens.   Uncle: RA.     Physical Exam     Temp Readings from Last 3 Encounters:   12/24/24 97.5  F (36.4  C) (Temporal)   09/05/24 98.5  F (36.9  C) (Tympanic)   04/10/24 98.4  F (36.9  C) (Oral)     BP Readings from Last 5 Encounters:   12/24/24 118/82   11/20/24 128/86   09/05/24 138/82   04/10/24 136/74   02/05/24 (!) 142/84     Pulse Readings from Last 1 Encounters:   01/09/25 66     Resp Readings from Last 1 Encounters:  "  01/09/25 16     Estimated body mass index is 36.32 kg/m  as calculated from the following:    Height as of 12/24/24: 1.59 m (5' 2.6\").    Weight as of 12/24/24: 91.8 kg (202 lb 6.4 oz).    GEN: NAD.   HEENT:  Anicteric, noninjected sclera. No obvious external lesions of the ear and nose. Hearing intact.  CV: S1, S2. RRR. No m/r/g  PULM: No increased work of breathing. CTA bilaterally   MSK: MCPs, PIPs, DIPs without swelling or tenderness to palpation.  Wrists without swelling or tenderness to palpation.  Elbows and shoulders without swelling or tenderness to palpation. Knees and ankles without swelling or tenderness to palpation.    SKIN: No rash or jaundice seen  PSYCH: Alert. Appropriate.      Labs / Imaging (select studies)     RF/CCP  Recent Labs   Lab Test 03/09/23  1246   CCPIGG 1.1   RHF <7     JUD  Recent Labs   Lab Test 03/09/23  1246   LEISA Positive*   ANAP1 Speckled   ANAT1 1:320     RNP/Sm/SSA/SSB  Recent Labs   Lab Test 03/09/23  1246   SSAIGG Negative   SSBIGG Negative       Antiphospholipid Antibodies  Recent Labs   Lab Test 01/26/24  1000   B2GPG 1.9   B2GPM <2.4   CARDG Negative   CARDM Negative   LUPINT Negative  The INR is normal.  APTT ratio is normal.    DRVVT Screen ratio is normal.  Thrombin time is normal.  NEGATIVE TEST; A LUPUS ANTICOAGULANT WAS NOT DETECTED IN THIS SPECIMEN WITHIN THE LIMITS OF THE TESTING REPERTOIRE.  If the clinical picture is strongly suggestive of an antiphospholipid syndrome, recommend anticardiolipin and beta-2-glycoprotein (IgG and IgM) antibody tests.    Bianca Salazar MD, PhD  [facility]           CBC  Recent Labs   Lab Test 11/20/24  0929 01/26/24  1000 12/16/22  0852 11/21/19  1613 06/12/18  1433   WBC 4.8 7.4 4.6   < > 5.9   RBC 5.02 5.02 4.83   < > 5.29*   HGB 15.4 11.8 12.8   < > 14.0   HCT 45.9 38.2 40.3   < > 42.7   MCV 91 76* 83   < > 81   RDW 13.1 18.2* 15.2*   < > 19.1*    443 375   < > 348   MCH 30.7 23.5* 26.5   < > 26.5   MCHC 33.6 " 30.9* 31.8   < > 32.8   NEUTROPHIL 53  --   --   --  53.1   LYMPH 28  --   --   --  32.4   MONOCYTE 10  --   --   --  7.9   EOSINOPHIL 7  --   --   --  5.4   BASOPHIL 2  --   --   --  1.2   ANEU  --   --   --   --  3.2   ALYM  --   --   --   --  1.9   JOSE JUAN  --   --   --   --  0.5   AEOS  --   --   --   --  0.3   ABAS  --   --   --   --  0.1   ANEUTAUTO 2.6  --   --   --   --    ALYMPAUTO 1.4  --   --   --   --    AMONOAUTO 0.5  --   --   --   --    AEOSAUTO 0.4  --   --   --   --    ABSBASO 0.1  --   --   --   --     < > = values in this interval not displayed.     CMP  Recent Labs   Lab Test 11/20/24  0929 12/21/23  0941 12/16/22  0852 11/22/21  0804 10/16/20  1615 11/21/19  1613 11/08/18  1542 09/07/17  1622    140 141 141 139 138  --  139   POTASSIUM 4.8 4.2 4.0 4.1 3.9 3.7  --  4.3   CHLORIDE 103 104 108 110* 104 106  --  105   CO2 26 27 29 27 26 29  --  23   ANIONGAP 11 9 4 4 9 3  --  11   GLC 93 93 96 94 86 84   < > 93   BUN 13.8 9.5 13 12 9 11  --  10   CR 1.00* 0.87 0.80 0.79 0.80 0.88  --  0.85   GFRESTIMATED 64 76 85 83 82 74  --  70   GFRESTBLACK  --   --   --   --  >90 86  --  85   MARIBEL 10.2 9.6 9.3 9.2 8.7 9.3  --  9.2   BILITOTAL 0.5 0.4  --   --   --   --   --   --    ALBUMIN 4.4 4.4  --   --   --   --   --   --    PROTTOTAL 7.8 8.0  --   --   --   --   --   --    ALKPHOS 113 114  --   --   --   --   --   --    AST 22 23  --   --   --   --   --   --    ALT 8 16  --   --   --   --   --   --     < > = values in this interval not displayed.     HgA1c  Recent Labs   Lab Test 12/16/22  0852   A1C 5.7*     Iron Studies  Recent Labs   Lab Test 11/22/21  0804 06/12/18  1433   LUIS MANUEL 8 8     Calcium/VitaminD  Recent Labs   Lab Test 11/20/24  0929 12/21/23  0941 12/16/22  0852   MARIBEL 10.2 9.6 9.3     ESR/CRP  Recent Labs   Lab Test 12/16/22  0852 06/12/18  1433   SED  --  10   CRP <2.9 <2.9     TSH/T4  Recent Labs   Lab Test 12/07/24  1520 12/21/23  0941 12/16/22  0852 11/22/21  0804 10/16/20  1615  11/21/19  1613   TSH 3.73 2.83 3.77 4.23*   < > 4.36*   T4  --   --   --  1.04  --  1.07    < > = values in this interval not displayed.     Lipid Panel  Recent Labs   Lab Test 12/21/23  0941 12/16/22  0852 11/08/18  1542   CHOL 180 185 161   TRIG 61 63 130   HDL 62 62 52   * 110* 83   NHDL 118 123 109     Lyme ab screening  Recent Labs   Lab Test 06/12/18  1433   LYMEGM 0.14     HIV Screening  Recent Labs   Lab Test 06/12/18  1433   HIAGAB Nonreactive     Synovial Fluid Studies  Recent Labs   Lab Test 12/26/24  1601   FCOL Red*   FAPR Hazy*   FWBC 15,275   FNEU 95   FLYM 2   FMONO 2   CRYSTL No clinically significant crystals seen.     Immunization History     Immunization History   Administered Date(s) Administered    COVID-19 12+ (Pfizer) 12/13/2023, 09/04/2024    COVID-19 Bivalent 18+ (Moderna) 11/26/2022    COVID-19 Monovalent 18+ (Moderna) 02/27/2021, 03/27/2021, 11/05/2021, 05/25/2022    DT (PEDS <7y) 04/01/1999    Flu, Unspecified 10/13/2022    Hep B, Adult (Heplisav- B) 09/04/2024    Hepatitis B, Adult 12/21/2023, 04/10/2024    Influenza (IIV3) PF 12/07/2005, 10/20/2010, 12/01/2011, 10/01/2012, 10/26/2014, 10/27/2018, 10/12/2019, 09/18/2020    Influenza (prior to 2024) 10/20/2010, 12/01/2011    Influenza Vaccine 18-64 (Flublok) 09/27/2023    Influenza Vaccine >6 months,quad, PF 09/26/2013, 10/27/2014, 10/15/2015, 09/02/2016, 10/27/2018, 10/12/2019, 09/25/2021, 10/13/2022    Influenza Vaccine, 6+MO IM (QUADRIVALENT W/PRESERVATIVES) 10/05/2017    Influenza, Split Virus, Trivalent, Pf (Fluzone\Fluarix) 10/29/2024    Influenza,INJ,MDCK,PF,Quad >6mo(Flucelvax) 09/29/2020    RSV Vaccine (Abrysvo) 12/24/2024    TD,PF 7+ (Tenivac) 08/01/2010    TDAP Vaccine (Adacel) 11/21/2019    Zoster recombinant adjuvanted (SHINGRIX) 11/21/2019, 02/17/2020          Chart documentation done in part with Dragon Voice recognition Software. Although reviewed after completion, some word and grammatical error may  remain.    Aron Cee MD

## 2025-02-06 ENCOUNTER — ANCILLARY PROCEDURE (OUTPATIENT)
Dept: MAMMOGRAPHY | Facility: CLINIC | Age: 60
End: 2025-02-06
Attending: STUDENT IN AN ORGANIZED HEALTH CARE EDUCATION/TRAINING PROGRAM
Payer: COMMERCIAL

## 2025-02-06 DIAGNOSIS — Z12.31 VISIT FOR SCREENING MAMMOGRAM: ICD-10-CM

## 2025-03-20 ENCOUNTER — TELEPHONE (OUTPATIENT)
Dept: VASCULAR SURGERY | Facility: CLINIC | Age: 60
End: 2025-03-20
Payer: COMMERCIAL

## 2025-03-20 DIAGNOSIS — Z86.718 PERSONAL HISTORY OF DVT (DEEP VEIN THROMBOSIS): Primary | ICD-10-CM

## 2025-03-20 NOTE — TELEPHONE ENCOUNTER
I called Ms. Willoughby today to follow-up on our anticoagulation plan.    Please review my previous documentation from 1/26/2024 and 4/26/2024.    Her last D-dimer that was done at 6 months aleks[facility] came back low at 0.29 micrograms/mL.    Tells me that she was recently diagnosed with inflammatory polyarthropathy with rheumatology for which she is now on hydroxychloroquine.  Otherwise no new updates in her health.        Assessment:  Right distal DVT in the posterior tibial vein 1/23/2024  Obesity class II BMI 36.8  Bilateral Baker's cyst      Plan:  Continue with no anticoagulation.  Continue serial D-dimer.  We will now move to annual check for the first expected around early-mid December 2025. Ordered.  She currently has scheduled virtual visit with me in May and we will move this visit to be in December after her D-dimer.      Irma Steel MD, [facility]  Vascular Medicine  March 20, 2025

## 2025-03-23 ENCOUNTER — LAB (OUTPATIENT)
Dept: LAB | Facility: CLINIC | Age: 60
End: 2025-03-23
Payer: COMMERCIAL

## 2025-03-23 DIAGNOSIS — Z86.718 PERSONAL HISTORY OF DVT (DEEP VEIN THROMBOSIS): ICD-10-CM

## 2025-03-23 DIAGNOSIS — M19.90 INFLAMMATORY ARTHRITIS: ICD-10-CM

## 2025-03-23 LAB
ALBUMIN SERPL BCG-MCNC: 4.2 G/DL (ref 3.5–5.2)
ALP SERPL-CCNC: 97 U/L (ref 40–150)
ALT SERPL W P-5'-P-CCNC: 14 U/L (ref 0–50)
AST SERPL W P-5'-P-CCNC: 24 U/L (ref 0–45)
BASOPHILS # BLD AUTO: 0.1 10E3/UL (ref 0–0.2)
BASOPHILS NFR BLD AUTO: 2 %
BILIRUB DIRECT SERPL-MCNC: 0.17 MG/DL (ref 0–0.3)
BILIRUB SERPL-MCNC: 0.5 MG/DL
CREAT SERPL-MCNC: 0.87 MG/DL (ref 0.51–0.95)
CRP SERPL-MCNC: <3 MG/L
EGFRCR SERPLBLD CKD-EPI 2021: 76 ML/MIN/1.73M2
EOSINOPHIL # BLD AUTO: 0.4 10E3/UL (ref 0–0.7)
EOSINOPHIL NFR BLD AUTO: 9 %
ERYTHROCYTE [DISTWIDTH] IN BLOOD BY AUTOMATED COUNT: 13.1 % (ref 10–15)
ERYTHROCYTE [SEDIMENTATION RATE] IN BLOOD BY WESTERGREN METHOD: 7 MM/HR (ref 0–30)
HBV CORE AB SERPL QL IA: NONREACTIVE
HBV SURFACE AG SERPL QL IA: NONREACTIVE
HCT VFR BLD AUTO: 44.3 % (ref 35–47)
HCV AB SERPL QL IA: NONREACTIVE
HGB BLD-MCNC: 14.8 G/DL (ref 11.7–15.7)
IMM GRANULOCYTES # BLD: 0 10E3/UL
IMM GRANULOCYTES NFR BLD: 0 %
LYMPHOCYTES # BLD AUTO: 1.6 10E3/UL (ref 0.8–5.3)
LYMPHOCYTES NFR BLD AUTO: 36 %
MCH RBC QN AUTO: 30.6 PG (ref 26.5–33)
MCHC RBC AUTO-ENTMCNC: 33.4 G/DL (ref 31.5–36.5)
MCV RBC AUTO: 92 FL (ref 78–100)
MONOCYTES # BLD AUTO: 0.5 10E3/UL (ref 0–1.3)
MONOCYTES NFR BLD AUTO: 11 %
NEUTROPHILS # BLD AUTO: 1.9 10E3/UL (ref 1.6–8.3)
NEUTROPHILS NFR BLD AUTO: 43 %
PLATELET # BLD AUTO: 293 10E3/UL (ref 150–450)
PROT SERPL-MCNC: 7.4 G/DL (ref 6.4–8.3)
RBC # BLD AUTO: 4.83 10E6/UL (ref 3.8–5.2)
WBC # BLD AUTO: 4.4 10E3/UL (ref 4–11)

## 2025-03-23 PROCEDURE — 36415 COLL VENOUS BLD VENIPUNCTURE: CPT

## 2025-03-23 PROCEDURE — 80076 HEPATIC FUNCTION PANEL: CPT

## 2025-03-23 PROCEDURE — 85652 RBC SED RATE AUTOMATED: CPT

## 2025-03-23 PROCEDURE — 82565 ASSAY OF CREATININE: CPT

## 2025-03-23 PROCEDURE — 85025 COMPLETE CBC W/AUTO DIFF WBC: CPT

## 2025-03-23 PROCEDURE — 86803 HEPATITIS C AB TEST: CPT

## 2025-03-23 PROCEDURE — 86704 HEP B CORE ANTIBODY TOTAL: CPT

## 2025-03-23 PROCEDURE — 85379 FIBRIN DEGRADATION QUANT: CPT

## 2025-03-23 PROCEDURE — 86140 C-REACTIVE PROTEIN: CPT

## 2025-03-23 PROCEDURE — 87340 HEPATITIS B SURFACE AG IA: CPT

## 2025-03-24 LAB — D DIMER PPP FEU-MCNC: 0.28 UG/ML FEU (ref 0–0.5)

## 2025-04-02 ENCOUNTER — TELEPHONE (OUTPATIENT)
Dept: VASCULAR SURGERY | Facility: CLINIC | Age: 60
End: 2025-04-02
Payer: COMMERCIAL

## 2025-04-02 NOTE — TELEPHONE ENCOUNTER
The pt is scheduled on 5/9/25 at the [facility] with .  I LVM & SENT MYCHART MSG PT WILL NEED TO VIDA DUE TO PROV SCHEDULE ADJUSTMENT.  Rolly York on 4/2/2025 at 4:19 PM

## 2025-04-15 DIAGNOSIS — Z86.718 HISTORY OF DVT OF LOWER EXTREMITY: ICD-10-CM

## 2025-04-15 DIAGNOSIS — Z86.718 PERSONAL HISTORY OF DVT (DEEP VEIN THROMBOSIS): Primary | ICD-10-CM

## 2025-04-17 ENCOUNTER — OFFICE VISIT (OUTPATIENT)
Dept: RHEUMATOLOGY | Facility: CLINIC | Age: 60
End: 2025-04-17
Payer: COMMERCIAL

## 2025-04-17 VITALS
BODY MASS INDEX: 35.81 KG/M2 | SYSTOLIC BLOOD PRESSURE: 118 MMHG | RESPIRATION RATE: 16 BRPM | DIASTOLIC BLOOD PRESSURE: 78 MMHG | WEIGHT: 199.6 LBS | OXYGEN SATURATION: 100 % | HEART RATE: 71 BPM

## 2025-04-17 DIAGNOSIS — M19.90 INFLAMMATORY ARTHRITIS: ICD-10-CM

## 2025-04-17 DIAGNOSIS — M35.00 SICCA SYNDROME: ICD-10-CM

## 2025-04-17 RX ORDER — HYDROXYCHLOROQUINE SULFATE 200 MG/1
200 TABLET, FILM COATED ORAL 2 TIMES DAILY
Qty: 180 TABLET | Refills: 2 | Status: SHIPPED | OUTPATIENT
Start: 2025-04-17

## 2025-04-17 NOTE — NURSING NOTE
RAPID3 (0-30) Cumulative Score  1.5          RAPID3 Weighted Score (divide #4 by 3 and that is the weighted score)  0.3         
Patent

## 2025-04-17 NOTE — PATIENT INSTRUCTIONS
RHEUMATOLOGY    [facility] [place]  [address] [place] [address]  [place] [address]    Phone number: [phone]  Fax number: [phone]    If you need a medication refill, please contact us as you may need lab work and/or a follow up visit prior to your refill.      Thank you for choosing [facility]!    Connie Elam CMA Rheumatology

## 2025-04-17 NOTE — PROGRESS NOTES
Rheumatology Clinic Visit      Ave Willoughby MRN# [id number]   YOB: 1965 Age: 59 year old      Date of visit: 4/17/25   PCP: Dr. Roma [facility]  Ophthalmology: Dr. Becky Thomas at [facility]    Chief Complaint   Patient presents with:  Inflammatory arthritis : Doing a lot better    Assessment and Plan     1.  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  Dry eyes are treated with artificial tears; uses contacts.  Advised that she try using preservative-free artificial tears, and to follow-up with her ophthalmologist to see about possibly getting contacts for dry eyes.  No dry mouth, she is already using Biotene products, sugar-free lozenges, sugar-free gum, avoidance of sugary/caffeinated/carbonated beverages, following with the dentist twice yearly and periodontist twice yearly for periodontal disease.  Significant dry mouth when first evaluated on 8/22/2023.  We reviewed the suspicion for Sjogren's syndrome, but the SSA and SSB were negative.  We previously discussed the option of minor salivary gland biopsy but this would not change the symptomatic treatment at this point, and therefore deferred by patient.  Pilocarpine has been used since 8/2023 with good results, but sometimes more urination or sweating so she is going to try a lower dose of 2.5 mg instead of the 5 mg dose.  With a diagnosis of anti-inflammatory arthritis in January 2025, the sicca syndrome is likely secondary Sjogren syndrome.  Chronic illness, stable.    - Continue pilocarpine 2.5-5 mg 2-3 times daily     2.  Inflammatory polyarthropathy: At a previous visit she had reported PIP pain and swelling but there was no synovitis on exam at that time or today.  Then when seen in January 2025 she had a  recurring knee effusion; synovial fluid showed a white blood cell count of 34805, 95% neutrophil, and no crystals.  The synovial fluid analysis suggest an underlying inflammatory etiology.  Less  likely pseudo-septic reaction related to Synvisc considering synovial fluid white blood cell count.  Started hydroxychloroquine in January 2025.  As of 4/17/2025 she is doing well with regard to inflammatory arthritis.  Chronic illness, stable.    - Continue hydroxychloroquine 200 mg twice daily  - Ophthalmology referral for hydroxychloroquine toxicity monitoring; she already goes to an ophthalmologist so she will call her ophthalmologist to arrange the hydroxychloroquine toxicity monitoring based on exam  - No rheumatology labs prior to follow-up in 4 months    3.  Primary osteoarthritis of both knees: Following with orthopedics/sports medicine for Synvisc and steroid injections that have been helpful historically.    4. Primary osteoarthritis of both hands: Evidenced by squaring of bilateral first CMC joints Heberden's nodes, Luis's nodes.  Topical Voltaren gel effective.  Hand therapy referral given previously    5. primary osteoarthritis of both feet: History of bunion surgery.  Currently with bony hypertrophy at the first MTPs.  Stiff soled shoes whenever ambulatory, indoors and outdoors, to prevent repetitive articulation at the MTPs.    Total minutes spent in evaluation with patient, documentation, , and review of pertinent studies and chart notes: 10    Ms. Willoughby verbalized agreement with and understanding of the rational for the diagnosis and treatment plan.  All questions were answered to best of my ability and the patient's satisfaction. Ms. Willoughby was advised to contact the clinic with any questions that may arise after the clinic visit.      Thank you for involving me in the care of the patient    Return to clinic: 4 months    HPI   Ave Willoughby is a 59 year old female with a past medical history significant for lumbar foraminal stenosis, hypothyroidism, hx of DVT (s/p period of time on anticoagulation) and positive JUD who presents for rheumatology follow-up.    8/22/2023: 3-5  years of worsening joint symptoms; feeling of swelling in the hands, knees, and feet; worse in the AM, better with activity.  Morning stiffness for about 2 hours.  Dry eyes/mouth/vaginal.  Wears contacts; uses artificial tears (one for when using contacts, and one for when contacts are not in).  Dry mouth tx'd with Biotene mouthwash at bedtime, occasional a biotene spray; sipping water several times per hour; sugar free gum sometimes; sugar free lozenges.  Seeing a dentist and periodontist each twice yearly. Canker sores 3-4 x/yr, occurring for as long as she can remember.; not always in the same location, sometimes painful; hx of cavities but none for the past few years.    Staying active; doing a triathlon this weekend.     Had palpitations previously but none now.  Hypothyroidism on stable treatment for a while now.  Used to use meloxicam but no longer.    Has been seen at [facility] where Synvisc injection for each knee has been helpful    11/30/2023: Voltaren gel is helpful.  She would like to go to hand therapy for hand osteoarthritis.  Pilocarpine 5 mg twice daily is very effective for dry mouth; no longer having significant dry mouth.  Pilocarpine 3 times daily was too much -hot sweating.    2/5/2024: Started to have general ill feeling, more fatigue, then few weeks later diagnosed with DVT.  Currently on anticoagulation at direction of another clinic.  At 1 point had more joint pain in the PIPs where there was also swelling; pain at the PIPs was not necessarily better or worse with activity.  Swelling of the PIPs has since resolved.  Feel that she is doing better now.      11/20/2024: pilocarpine 5mg BID is helpful for dry mouth but sometimes associated with increased urinary frequency after taking it, and sweating.  Some pain at the first CMC joints that is worse with activity and improves with rest; topical Voltaren gel is helpful.  Knee pain followed by orthopedics; worse with activity and better  with rest.    1/9/2025: Since last seen had synovial fluid removed from her right knee that was found to be inflammatory, prompting an earlier visit today.  She has been following with orthopedic/sports medicine for Synvisc injections and steroid injections.  She also notes that she has had 4 times where fluid has been removed, and twice the fluid started about 1 month after Synvisc injection.  Pain in the knee is worse in the morning and improved with activity but too much activity worsens her pain.  Significant improvement since having the synovial fluid removed from her knee.    Today, 4/17/2025: Significant improvement with hydroxychloroquine.   Hydroxychloroquine became effective 3-4 weeks after starting.  Occasional ache in the knees that are degenerative in nature.  Stiffness, pain, and swelling in the MCPs, PIPs, and feet have resolved.  Mild stomach upset with hydroxychloroquine initially but this has since resolved.  Dry mouth treated with pilocarpine.  Arthritis not limiting daily activities.  Currently happy with how well she is doing.    Denies fevers, chills, nausea, vomiting, constipation, diarrhea. No abdominal pain.  No black or bloody stools.  No chest pain/pressure, palpitations, or shortness of breath at this time. No LE swelling.  No rash.    Tobacco: none  EtOH: 4 drinks/weeks  Drugs: none  Occupation:     ROS   12 point review of system was completed and negative except as noted in the HPI     Active Problem List     Patient Active Problem List   Diagnosis    Lumbar foraminal stenosis    Family history of colon cancer    Hypothyroidism due to acquired atrophy of thyroid    History of colonic polyps    Anhidrosis    Posterior knee pain, left     Past Medical History     Past Medical History:   Diagnosis Date    Arthritis     Family history of colon cancer     due 2013 for     History of colonic polyps 06/06/2023    Hypothyroid     Hypothyroidism due to acquired  atrophy of thyroid 10/15/2015    Lumbar foraminal stenosis 07/17/2012    Sciatic nerve disease      Past Surgical History     Past Surgical History:   Procedure Laterality Date    APPENDECTOMY  4/11/2011    Dr Lona Duncan    COLONOSCOPY  9-29-08    COLONOSCOPY N/A 8/9/2018    Procedure: COMBINED COLONOSCOPY, SINGLE OR MULTIPLE BIOPSY/POLYPECTOMY BY BIOPSY;;  Surgeon: Lc Cervantes MD;  Location: [facility] OR    COLONOSCOPY WITH CO2 INSUFFLATION N/A 8/9/2018    Procedure: COLONOSCOPY WITH CO2 INSUFFLATION;  COLON SCREEN/ PATTIE;  Surgeon: Lc Cervantes MD;  Location: [facility]    COLONOSCOPY WITH CO2 INSUFFLATION N/A 10/16/2023    Procedure: Colonoscopy with CO2 insufflation;  Surgeon: Calvin Bar MD;  Location: [facility] OR    ENDOSCOPY UPPER, COLONOSCOPY, COMBINED  7/1/2013    Procedure: COMBINED ENDOSCOPY UPPER, COLONOSCOPY;  COLONOSCOPY SCREEN-FAMILY HX OF COLON CANCER/ EGD-UPPER ENDOSCOPY-UPPER GI SYMPTOMS/ PATTIE;  Surgeon: Lc Cervantes MD;  Location: [facility] OR    ESOPHAGOSCOPY, GASTROSCOPY, DUODENOSCOPY (EGD), COMBINED  7/1/2013    Procedure: COMBINED ESOPHAGOSCOPY, GASTROSCOPY, DUODENOSCOPY (EGD), BIOPSY SINGLE OR MULTIPLE;;  Surgeon: Lc Cervantes MD;  Location: [facility] OR     Allergy     Allergies   Allergen Reactions    Pcn [Penicillins]      Current Medication List     Current Outpatient Medications   Medication Sig Dispense Refill    diclofenac (VOLTAREN) 1 % topical gel Apply up to 2 grams of 1% gel to upper extremity and up to 4 grams of 1% gel to lower extremity up to 4 times daily as needed for joint pain 400 g 2    famotidine (PEPCID) 20 MG tablet Take 20 mg by mouth daily      hydroxychloroquine (PLAQUENIL) 200 MG tablet Take 1 tablet (200 mg) by mouth 2 times daily. 60 tablet 3    ketoconazole (NIZORAL) 2 % external shampoo WASH TO AFFECTED AREA 2-3X WEEKLY IN THE SHOWER. LATHER AND LET SIT 1 TO 2 MINUTES PRIOR TO RINSING.      levothyroxine  "(SYNTHROID/LEVOTHROID) 75 MCG tablet Take 1 tablet (75 mcg) by mouth daily. 90 tablet 3    Multiple Vitamin (MULTIVITAMIN ADULT PO)       pilocarpine (SALAGEN) 5 MG tablet Take 1 tablet (5 mg) by mouth 3 times daily. 270 tablet 3    Vitamin D (Cholecalciferol) 25 MCG (1000 UT) CAPS        No current facility-administered medications for this visit.     Social History   See HPI    Family History     Family History   Problem Relation Age of Onset    Breast Cancer Mother     Thyroid Disease Mother     Cancer - colorectal Father     Cancer Father         brain    Colon Cancer Father     Diabetes Maternal Grandmother     Cardiovascular Paternal Grandmother     Thyroid Disease Sister      Sister: sjogrens.   Uncle: RA.     Physical Exam     Temp Readings from Last 3 Encounters:   12/24/24 97.5  F (36.4  C) (Temporal)   09/05/24 98.5  F (36.9  C) (Tympanic)   04/10/24 98.4  F (36.9  C) (Oral)     BP Readings from Last 5 Encounters:   01/09/25 122/78   12/24/24 118/82   11/20/24 128/86   09/05/24 138/82   04/10/24 136/74     Pulse Readings from Last 1 Encounters:   04/17/25 71     Resp Readings from Last 1 Encounters:   04/17/25 16     Estimated body mass index is 35.81 kg/m  as calculated from the following:    Height as of 12/24/24: 1.59 m (5' 2.6\").    Weight as of this encounter: 90.5 kg (199 lb 9.6 oz).    GEN: NAD.   HEENT:  Anicteric, noninjected sclera. No obvious external lesions of the ear and nose. Hearing intact.  CV: S1, S2. RRR. No m/r/g  PULM: No increased work of breathing. CTA bilaterally   MSK: MCPs, PIPs, DIPs without swelling or tenderness to palpation.  Wrists without swelling or tenderness to palpation.  Elbows and shoulders without swelling or tenderness to palpation. Knees and ankles without swelling or tenderness to palpation.    SKIN: No rash or jaundice seen  PSYCH: Alert. Appropriate.      Labs / Imaging (select studies)     RF/CCP  Recent Labs   Lab Test 03/09/23  1246   CCPIGG 1.1   RHF <7 "     JUD  Recent Labs   Lab Test 03/09/23  1246   LEISA Positive*   ANAP1 Speckled   ANAT1 1:320     RNP/Sm/SSA/SSB  Recent Labs   Lab Test 03/09/23  1246   SSAIGG Negative   SSBIGG Negative     Antiphospholipid Antibodies  Recent Labs   Lab Test 01/26/24  1000   B2GPG 1.9   B2GPM <2.4   CARDG Negative   CARDM Negative   LUPINT Negative  The INR is normal.  APTT ratio is normal.    DRVVT Screen ratio is normal.  Thrombin time is normal.  NEGATIVE TEST; A LUPUS ANTICOAGULANT WAS NOT DETECTED IN THIS SPECIMEN WITHIN THE LIMITS OF THE TESTING REPERTOIRE.  If the clinical picture is strongly suggestive of an antiphospholipid syndrome, recommend anticardiolipin and beta-2-glycoprotein (IgG and IgM) antibody tests.    Bianca Salazar MD, PhD  [facility]         CBC  Recent Labs   Lab Test 03/23/25  1202 11/20/24  0929 01/26/24  1000 11/21/19  1613 06/12/18  1433   WBC 4.4 4.8 7.4   < > 5.9   RBC 4.83 5.02 5.02   < > 5.29*   HGB 14.8 15.4 11.8   < > 14.0   HCT 44.3 45.9 38.2   < > 42.7   MCV 92 91 76*   < > 81   RDW 13.1 13.1 18.2*   < > 19.1*    339 443   < > 348   MCH 30.6 30.7 23.5*   < > 26.5   MCHC 33.4 33.6 30.9*   < > 32.8   NEUTROPHIL 43 53  --   --  53.1   LYMPH 36 28  --   --  32.4   MONOCYTE 11 10  --   --  7.9   EOSINOPHIL 9 7  --   --  5.4   BASOPHIL 2 2  --   --  1.2   ANEU  --   --   --   --  3.2   ALYM  --   --   --   --  1.9   JOSE JUAN  --   --   --   --  0.5   AEOS  --   --   --   --  0.3   ABAS  --   --   --   --  0.1   ANEUTAUTO 1.9 2.6  --   --   --    ALYMPAUTO 1.6 1.4  --   --   --    AMONOAUTO 0.5 0.5  --   --   --    AEOSAUTO 0.4 0.4  --   --   --    ABSBASO 0.1 0.1  --   --   --     < > = values in this interval not displayed.     CMP  Recent Labs   Lab Test 03/23/25  1202 01/09/25  1607 11/20/24  0929 12/21/23  0941 12/16/22  0852 11/22/21  0804 11/22/21  0804 10/16/20  1615 11/21/19  1613 11/08/18  1542 09/07/17  1622   NA  --   --  140 140 141  --  141 139 138  --  139   POTASSIUM  --    --  4.8 4.2 4.0  --  4.1 3.9 3.7  --  4.3   CHLORIDE  --   --  103 104 108  --  110* 104 106  --  105   CO2  --   --  26 27 29  --  27 26 29  --  23   ANIONGAP  --   --  11 9 4  --  4 9 3  --  11   GLC  --   --  93 93 96  --  94 86 84   < > 93   BUN  --   --  13.8 9.5 13  --  12 9 11  --  10   CR 0.87 0.91 1.00* 0.87 0.80   < > 0.79 0.80 0.88  --  0.85   GFRESTIMATED 76 72 64 76 85   < > 83 82 74  --  70   GFRESTBLACK  --   --   --   --   --   --   --  >90 86  --  85   MARIBEL  --   --  10.2 9.6 9.3  --  9.2 8.7 9.3  --  9.2   BILITOTAL 0.5  --  0.5 0.4  --   --   --   --   --   --   --    ALBUMIN 4.2  --  4.4 4.4  --   --   --   --   --   --   --    PROTTOTAL 7.4  --  7.8 8.0  --   --   --   --   --   --   --    ALKPHOS 97  --  113 114  --   --   --   --   --   --   --    AST 24  --  22 23  --   --   --   --   --   --   --    ALT 14  --  8 16  --   --   --   --   --   --   --     < > = values in this interval not displayed.     HgA1c  Recent Labs   Lab Test 12/16/22  0852   A1C 5.7*     Iron Studies  Recent Labs   Lab Test 11/22/21  0804 06/12/18  1433   LUIS MANUEL 8 8     Calcium/VitaminD  Recent Labs   Lab Test 11/20/24  0929 12/21/23  0941 12/16/22  0852   MARIBEL 10.2 9.6 9.3     ESR/CRP  Recent Labs   Lab Test 03/23/25  1202 12/16/22  0852 06/12/18  1433   SED 7  --  10   CRP  --  <2.9 <2.9   CRPI <3.00  --   --      TSH/T4  Recent Labs   Lab Test 12/07/24  1520 12/21/23  0941 12/16/22  0852 11/22/21  0804 10/16/20  1615 11/21/19  1613   TSH 3.73 2.83 3.77 4.23*   < > 4.36*   T4  --   --   --  1.04  --  1.07    < > = values in this interval not displayed.     Lipid Panel  Recent Labs   Lab Test 12/21/23  0941 12/16/22  0852 11/08/18  1542   CHOL 180 185 161   TRIG 61 63 130   HDL 62 62 52   * 110* 83   NHDL 118 123 109     Hepatitis B  Recent Labs   Lab Test 03/23/25  1202   HBCAB Nonreactive   HEPBANG Nonreactive     Hepatitis C  Recent Labs   Lab Test 03/23/25  1202   HCVAB Nonreactive     Lyme ab screening  Recent  Labs   Lab Test 06/12/18  1433   LYMEGM 0.14     HIV Screening  Recent Labs   Lab Test 06/12/18  1433   HIAGAB Nonreactive     Synovial Fluid Studies  Recent Labs   Lab Test 12/26/24  1601   FCOL Red*   FAPR Hazy*   FWBC 15,275   FNEU 95   FLYM 2   FMONO 2   CRYSTL No clinically significant crystals seen.       Immunization History     Immunization History   Administered Date(s) Administered    COVID-19 12+ (Pfizer) 12/13/2023, 09/04/2024    COVID-19 Bivalent 18+ (Moderna) 11/26/2022    COVID-19 Monovalent 18+ (Moderna) 02/27/2021, 03/27/2021, 11/05/2021, 05/25/2022    DT (PEDS <7y) 04/01/1999    Flu, Unspecified 10/13/2022    Hep B, Adult (Heplisav- B) 09/04/2024    Hepatitis B, Adult (Energix-B/Recombivax HB) 12/21/2023, 04/10/2024    Influenza (IIV3) PF 12/07/2005, 10/20/2010, 12/01/2011, 10/01/2012, 10/26/2014, 10/27/2018, 10/12/2019, 09/18/2020    Influenza (prior to 2024) 10/20/2010, 12/01/2011    Influenza Vaccine 18-64 (Flublok) 09/27/2023    Influenza Vaccine >6 months,quad, PF 09/26/2013, 10/27/2014, 10/15/2015, 09/02/2016, 10/27/2018, 10/12/2019, 09/25/2021, 10/13/2022    Influenza Vaccine, 6+MO IM (QUADRIVALENT W/PRESERVATIVES) 10/05/2017    Influenza, Split Virus, Trivalent, Pf (Fluzone\Fluarix) 10/29/2024    Influenza,INJ,MDCK,PF,Quad >6mo(Flucelvax) 09/29/2020    RSV (Abrysvo) 12/24/2024    TD,PF 7+ (Tenivac) 08/01/2010    TDAP Vaccine (Adacel) 11/21/2019    Zoster recombinant adjuvanted (Shingrix) 11/21/2019, 02/17/2020          Chart documentation done in part with Dragon Voice recognition Software. Although reviewed after completion, some word and grammatical error may remain.    Aron Cee MD

## 2025-05-08 ENCOUNTER — TRANSFERRED RECORDS (OUTPATIENT)
Dept: HEALTH INFORMATION MANAGEMENT | Facility: CLINIC | Age: 60
End: 2025-05-08
Payer: COMMERCIAL

## 2025-06-30 ENCOUNTER — TRANSFERRED RECORDS (OUTPATIENT)
Dept: HEALTH INFORMATION MANAGEMENT | Facility: CLINIC | Age: 60
End: 2025-06-30
Payer: COMMERCIAL

## (undated) DEVICE — PAD CHUX UNDERPAD 23X24" 7136

## (undated) DEVICE — SOL WATER IRRIG 1000ML BOTTLE 07139-09

## (undated) DEVICE — KIT ENDO FIRST STEP DISINFECTANT 200ML W/POUCH EP-4

## (undated) RX ORDER — FENTANYL CITRATE 50 UG/ML
INJECTION, SOLUTION INTRAMUSCULAR; INTRAVENOUS
Status: DISPENSED
Start: 2018-08-09

## (undated) RX ORDER — SIMETHICONE 40MG/0.6ML
SUSPENSION, DROPS(FINAL DOSAGE FORM)(ML) ORAL
Status: DISPENSED
Start: 2018-08-09

## (undated) RX ORDER — FENTANYL CITRATE 50 UG/ML
INJECTION, SOLUTION INTRAMUSCULAR; INTRAVENOUS
Status: DISPENSED
Start: 2023-10-16